# Patient Record
Sex: FEMALE | Race: WHITE | NOT HISPANIC OR LATINO | Employment: OTHER | URBAN - METROPOLITAN AREA
[De-identification: names, ages, dates, MRNs, and addresses within clinical notes are randomized per-mention and may not be internally consistent; named-entity substitution may affect disease eponyms.]

---

## 2019-05-12 ENCOUNTER — HOSPITAL ENCOUNTER (INPATIENT)
Facility: MEDICAL CENTER | Age: 84
LOS: 2 days | DRG: 378 | End: 2019-05-17
Attending: EMERGENCY MEDICINE | Admitting: HOSPITALIST
Payer: MEDICARE

## 2019-05-12 ENCOUNTER — APPOINTMENT (OUTPATIENT)
Dept: RADIOLOGY | Facility: MEDICAL CENTER | Age: 84
DRG: 378 | End: 2019-05-12
Attending: EMERGENCY MEDICINE
Payer: MEDICARE

## 2019-05-12 DIAGNOSIS — E87.1 HYPONATREMIA: ICD-10-CM

## 2019-05-12 DIAGNOSIS — E03.9 HYPOTHYROIDISM, UNSPECIFIED TYPE: ICD-10-CM

## 2019-05-12 DIAGNOSIS — G89.4 CHRONIC PAIN DISORDER: ICD-10-CM

## 2019-05-12 DIAGNOSIS — R55 NEAR SYNCOPE: ICD-10-CM

## 2019-05-12 DIAGNOSIS — I10 BENIGN ESSENTIAL HTN: ICD-10-CM

## 2019-05-12 DIAGNOSIS — K62.5 RECTAL BLEED: ICD-10-CM

## 2019-05-12 DIAGNOSIS — K92.2 LOWER GI BLEED: ICD-10-CM

## 2019-05-12 PROBLEM — E78.5 HLD (HYPERLIPIDEMIA): Status: ACTIVE | Noted: 2019-05-12

## 2019-05-12 LAB
ABO GROUP BLD: NORMAL
ALBUMIN SERPL BCP-MCNC: 3.7 G/DL (ref 3.2–4.9)
ALBUMIN/GLOB SERPL: 1.2 G/DL
ALP SERPL-CCNC: 56 U/L (ref 30–99)
ALT SERPL-CCNC: 10 U/L (ref 2–50)
ANION GAP SERPL CALC-SCNC: 7 MMOL/L (ref 0–11.9)
APTT PPP: 27.8 SEC (ref 24.7–36)
AST SERPL-CCNC: 19 U/L (ref 12–45)
BASOPHILS # BLD AUTO: 0.1 % (ref 0–1.8)
BASOPHILS # BLD: 0.01 K/UL (ref 0–0.12)
BILIRUB SERPL-MCNC: 0.5 MG/DL (ref 0.1–1.5)
BLD GP AB SCN SERPL QL: NORMAL
BUN SERPL-MCNC: 16 MG/DL (ref 8–22)
CALCIUM SERPL-MCNC: 10 MG/DL (ref 8.5–10.5)
CHLORIDE SERPL-SCNC: 89 MMOL/L (ref 96–112)
CO2 SERPL-SCNC: 33 MMOL/L (ref 20–33)
CREAT SERPL-MCNC: 0.62 MG/DL (ref 0.5–1.4)
EOSINOPHIL # BLD AUTO: 0.01 K/UL (ref 0–0.51)
EOSINOPHIL NFR BLD: 0.1 % (ref 0–6.9)
ERYTHROCYTE [DISTWIDTH] IN BLOOD BY AUTOMATED COUNT: 46.5 FL (ref 35.9–50)
GLOBULIN SER CALC-MCNC: 3.1 G/DL (ref 1.9–3.5)
GLUCOSE SERPL-MCNC: 116 MG/DL (ref 65–99)
HCT VFR BLD AUTO: 30.3 % (ref 37–47)
HGB BLD-MCNC: 10.2 G/DL (ref 12–16)
IMM GRANULOCYTES # BLD AUTO: 0.03 K/UL (ref 0–0.11)
IMM GRANULOCYTES NFR BLD AUTO: 0.3 % (ref 0–0.9)
INR PPP: 1.04 (ref 0.87–1.13)
LIPASE SERPL-CCNC: 35 U/L (ref 11–82)
LYMPHOCYTES # BLD AUTO: 1.34 K/UL (ref 1–4.8)
LYMPHOCYTES NFR BLD: 14.3 % (ref 22–41)
MCH RBC QN AUTO: 31.2 PG (ref 27–33)
MCHC RBC AUTO-ENTMCNC: 33.7 G/DL (ref 33.6–35)
MCV RBC AUTO: 92.7 FL (ref 81.4–97.8)
MONOCYTES # BLD AUTO: 0.83 K/UL (ref 0–0.85)
MONOCYTES NFR BLD AUTO: 8.9 % (ref 0–13.4)
NEUTROPHILS # BLD AUTO: 7.13 K/UL (ref 2–7.15)
NEUTROPHILS NFR BLD: 76.3 % (ref 44–72)
NRBC # BLD AUTO: 0 K/UL
NRBC BLD-RTO: 0 /100 WBC
OSMOLALITY SERPL: 276 MOSM/KG H2O (ref 278–298)
PLATELET # BLD AUTO: 184 K/UL (ref 164–446)
PMV BLD AUTO: 9.1 FL (ref 9–12.9)
POTASSIUM SERPL-SCNC: 3.6 MMOL/L (ref 3.6–5.5)
PROT SERPL-MCNC: 6.8 G/DL (ref 6–8.2)
PROTHROMBIN TIME: 13.7 SEC (ref 12–14.6)
RBC # BLD AUTO: 3.27 M/UL (ref 4.2–5.4)
RH BLD: NORMAL
SODIUM SERPL-SCNC: 129 MMOL/L (ref 135–145)
TSH SERPL DL<=0.005 MIU/L-ACNC: 1.61 UIU/ML (ref 0.38–5.33)
WBC # BLD AUTO: 9.4 K/UL (ref 4.8–10.8)

## 2019-05-12 PROCEDURE — 83690 ASSAY OF LIPASE: CPT

## 2019-05-12 PROCEDURE — G0378 HOSPITAL OBSERVATION PER HR: HCPCS

## 2019-05-12 PROCEDURE — 84443 ASSAY THYROID STIM HORMONE: CPT

## 2019-05-12 PROCEDURE — 700105 HCHG RX REV CODE 258: Performed by: EMERGENCY MEDICINE

## 2019-05-12 PROCEDURE — 99285 EMERGENCY DEPT VISIT HI MDM: CPT

## 2019-05-12 PROCEDURE — 700117 HCHG RX CONTRAST REV CODE 255: Performed by: EMERGENCY MEDICINE

## 2019-05-12 PROCEDURE — 86850 RBC ANTIBODY SCREEN: CPT

## 2019-05-12 PROCEDURE — 700102 HCHG RX REV CODE 250 W/ 637 OVERRIDE(OP): Performed by: HOSPITALIST

## 2019-05-12 PROCEDURE — 85610 PROTHROMBIN TIME: CPT

## 2019-05-12 PROCEDURE — 302255 BARRIER CREAM MOISTURE BAZA PROTECT (ZINC) 5OZ: Performed by: INTERNAL MEDICINE

## 2019-05-12 PROCEDURE — 99220 PR INITIAL OBSERVATION CARE,LEVL III: CPT | Performed by: HOSPITALIST

## 2019-05-12 PROCEDURE — A9270 NON-COVERED ITEM OR SERVICE: HCPCS | Performed by: HOSPITALIST

## 2019-05-12 PROCEDURE — 85025 COMPLETE CBC W/AUTO DIFF WBC: CPT

## 2019-05-12 PROCEDURE — 700105 HCHG RX REV CODE 258: Performed by: HOSPITALIST

## 2019-05-12 PROCEDURE — 80053 COMPREHEN METABOLIC PANEL: CPT

## 2019-05-12 PROCEDURE — 86900 BLOOD TYPING SEROLOGIC ABO: CPT

## 2019-05-12 PROCEDURE — 83930 ASSAY OF BLOOD OSMOLALITY: CPT

## 2019-05-12 PROCEDURE — 85730 THROMBOPLASTIN TIME PARTIAL: CPT

## 2019-05-12 PROCEDURE — 74177 CT ABD & PELVIS W/CONTRAST: CPT

## 2019-05-12 PROCEDURE — 306588 SLEEVE,VASO CALF MED: Performed by: HOSPITALIST

## 2019-05-12 PROCEDURE — 86901 BLOOD TYPING SEROLOGIC RH(D): CPT

## 2019-05-12 RX ORDER — LEVOTHYROXINE SODIUM 112 UG/1
112 TABLET ORAL
Status: DISCONTINUED | OUTPATIENT
Start: 2019-05-13 | End: 2019-05-17 | Stop reason: HOSPADM

## 2019-05-12 RX ORDER — SODIUM CHLORIDE, SODIUM LACTATE, POTASSIUM CHLORIDE, CALCIUM CHLORIDE 600; 310; 30; 20 MG/100ML; MG/100ML; MG/100ML; MG/100ML
1000 INJECTION, SOLUTION INTRAVENOUS ONCE
Status: COMPLETED | OUTPATIENT
Start: 2019-05-12 | End: 2019-05-12

## 2019-05-12 RX ORDER — ACETAMINOPHEN 325 MG/1
650 TABLET ORAL EVERY 6 HOURS PRN
Status: DISCONTINUED | OUTPATIENT
Start: 2019-05-12 | End: 2019-05-17 | Stop reason: HOSPADM

## 2019-05-12 RX ORDER — OXYBUTYNIN CHLORIDE 5 MG/1
5 TABLET ORAL 2 TIMES DAILY
Status: DISCONTINUED | OUTPATIENT
Start: 2019-05-12 | End: 2019-05-17 | Stop reason: HOSPADM

## 2019-05-12 RX ORDER — AMOXICILLIN 250 MG
2 CAPSULE ORAL 2 TIMES DAILY
Status: DISCONTINUED | OUTPATIENT
Start: 2019-05-12 | End: 2019-05-17 | Stop reason: HOSPADM

## 2019-05-12 RX ORDER — AMLODIPINE BESYLATE 2.5 MG/1
2.5 TABLET ORAL DAILY
Status: DISCONTINUED | OUTPATIENT
Start: 2019-05-13 | End: 2019-05-17 | Stop reason: HOSPADM

## 2019-05-12 RX ORDER — BISACODYL 10 MG
10 SUPPOSITORY, RECTAL RECTAL
Status: DISCONTINUED | OUTPATIENT
Start: 2019-05-12 | End: 2019-05-17 | Stop reason: HOSPADM

## 2019-05-12 RX ORDER — SODIUM CHLORIDE, SODIUM LACTATE, POTASSIUM CHLORIDE, CALCIUM CHLORIDE 600; 310; 30; 20 MG/100ML; MG/100ML; MG/100ML; MG/100ML
INJECTION, SOLUTION INTRAVENOUS CONTINUOUS
Status: DISCONTINUED | OUTPATIENT
Start: 2019-05-12 | End: 2019-05-14

## 2019-05-12 RX ORDER — LATANOPROST 50 UG/ML
1 SOLUTION/ DROPS OPHTHALMIC EVERY EVENING
Status: DISCONTINUED | OUTPATIENT
Start: 2019-05-12 | End: 2019-05-17 | Stop reason: HOSPADM

## 2019-05-12 RX ORDER — POLYETHYLENE GLYCOL 3350 17 G/17G
1 POWDER, FOR SOLUTION ORAL
Status: DISCONTINUED | OUTPATIENT
Start: 2019-05-12 | End: 2019-05-17 | Stop reason: HOSPADM

## 2019-05-12 RX ORDER — SIMVASTATIN 20 MG
20 TABLET ORAL EVERY EVENING
Status: DISCONTINUED | OUTPATIENT
Start: 2019-05-12 | End: 2019-05-17 | Stop reason: HOSPADM

## 2019-05-12 RX ORDER — HYDROCHLOROTHIAZIDE 25 MG/1
25 TABLET ORAL DAILY
Status: DISCONTINUED | OUTPATIENT
Start: 2019-05-13 | End: 2019-05-13

## 2019-05-12 RX ORDER — CETIRIZINE HYDROCHLORIDE 10 MG/1
10 TABLET ORAL EVERY MORNING
COMMUNITY
End: 2019-09-19

## 2019-05-12 RX ORDER — OXYBUTYNIN CHLORIDE 5 MG/1
5 TABLET ORAL 2 TIMES DAILY
Status: ON HOLD | COMMUNITY
End: 2019-05-17

## 2019-05-12 RX ADMIN — SODIUM CHLORIDE, POTASSIUM CHLORIDE, SODIUM LACTATE AND CALCIUM CHLORIDE: 600; 310; 30; 20 INJECTION, SOLUTION INTRAVENOUS at 21:16

## 2019-05-12 RX ADMIN — METOPROLOL TARTRATE 25 MG: 25 TABLET ORAL at 21:26

## 2019-05-12 RX ADMIN — SIMVASTATIN 20 MG: 20 TABLET, FILM COATED ORAL at 21:26

## 2019-05-12 RX ADMIN — SODIUM CHLORIDE, POTASSIUM CHLORIDE, SODIUM LACTATE AND CALCIUM CHLORIDE 1000 ML: 600; 310; 30; 20 INJECTION, SOLUTION INTRAVENOUS at 17:04

## 2019-05-12 RX ADMIN — IOHEXOL 90 ML: 350 INJECTION, SOLUTION INTRAVENOUS at 20:00

## 2019-05-12 RX ADMIN — OXYBUTYNIN CHLORIDE 5 MG: 5 TABLET ORAL at 21:27

## 2019-05-12 RX ADMIN — SENNOSIDES, DOCUSATE SODIUM 2 TABLET: 50; 8.6 TABLET, FILM COATED ORAL at 21:26

## 2019-05-12 ASSESSMENT — ENCOUNTER SYMPTOMS
DEPRESSION: 0
DIZZINESS: 0
ABDOMINAL PAIN: 1
FOCAL WEAKNESS: 0
HEADACHES: 0
PHOTOPHOBIA: 0
MYALGIAS: 0
COUGH: 0
VOMITING: 0
DIARRHEA: 0
SORE THROAT: 0
BLOOD IN STOOL: 1
SHORTNESS OF BREATH: 0
NAUSEA: 1
CHILLS: 0
FEVER: 0
TINGLING: 0
PALPITATIONS: 0
WHEEZING: 0

## 2019-05-12 ASSESSMENT — PATIENT HEALTH QUESTIONNAIRE - PHQ9
SUM OF ALL RESPONSES TO PHQ9 QUESTIONS 1 AND 2: 0
1. LITTLE INTEREST OR PLEASURE IN DOING THINGS: NOT AT ALL
2. FEELING DOWN, DEPRESSED, IRRITABLE, OR HOPELESS: NOT AT ALL

## 2019-05-12 ASSESSMENT — LIFESTYLE VARIABLES
EVER_SMOKED: NEVER
ALCOHOL_USE: NO

## 2019-05-12 NOTE — ED TRIAGE NOTES
"Pt to triage via w/c c/o blood in stool since yesterday. Denies blood thinners. Pt denies pain. Pt family states \"not black but not bright red either, the color is somewhere in between\" nad. Pt and family waiting in [facility]  "

## 2019-05-12 NOTE — ED PROVIDER NOTES
ED Provider Note    Scribed for Louie Esqueda M.D. by Mayra Grady. 5/12/2019  4:23 PM    Primary care provider: Pcp Not In Computer  Means of arrival: Wheel chair   History obtained from: Patient  History limited by: None    CHIEF COMPLAINT  Chief Complaint   Patient presents with   • Rectal Bleeding       HPI  Марина Giron is a 88 y.o. female with a past medical history of diverticulitis, hypertension, and hypothyroidism who presents to the Emergency Department with blood in stool onset last morning around 4:30 AM. Patient states she had 1 bowel movement early last morning and did not have any additional BM subsequently. She reports experiencing associated dizziness and lightheadedness yesterday while using the bathroom. Patient tried laying down for symptom relief however symptoms returned when she stood up again. She reports she is fatigued currently. Patient adds that she has been experiencing left sided abdominal pain. Patient states she had a similar episode previously and underwent colonoscopy with Dr. Mejia GI, and was diagnosed with diverticulitis 5 years ago. She is unsure if she underwent a colonoscopy since diagnosis. Denies fever.     REVIEW OF SYSTEMS  Pertinent negatives include no fever.   As above, all other systems reviewed and are negative.   See HPI for further details.     PAST MEDICAL HISTORY   has a past medical history of Chronic back pain; Diverticulitis; Hypertension; and Hypothyroid.    SURGICAL HISTORY   has a past surgical history that includes colonoscopy - endo (5/24/2009) and colonoscopy with polyp (10/11/2014).    SOCIAL HISTORY  Social History   Substance Use Topics   • Smoking status: Never Smoker   • Smokeless tobacco: Never Used   • Alcohol use No      History   Drug Use No     FAMILY HISTORY  History reviewed. No pertinent family history.    CURRENT MEDICATIONS  Home Medications     Reviewed by Elke Leonard R.N. (Registered Nurse) on 05/12/19 at 1523  Med List  "Status: Partial   Medication Last Dose Status   amlodipine (NORVASC) 2.5 MG TABS  Active   atenolol (TENORMIN) 25 MG TABS  Active   B Complex-C (SUPER B COMPLEX PO)  Active   Calcium Carbonate-Vitamin D (CALCIUM 600 + D PO)  Active   Cholecalciferol (VITAMIN D-3) 400 UNIT TABS  Active   hydrochlorothiazide (HYDRODIURIL) 25 MG TABS  Active   levothyroxine (SYNTHROID) 112 MCG TABS  Active   Multiple Vitamins-Minerals (PRESERVISION AREDS PO)  Active   NON SPECIFIED  Active   Psyllium (METAMUCIL PO)  Active   simvastatin (ZOCOR) 20 MG TABS  Active                ALLERGIES  Allergies   Allergen Reactions   • Codeine      Upset stomach.   • Neosporin [Neomycin-Bacitracin-Polymyxin] Rash     PHYSICAL EXAM  VITAL SIGNS: /61   Pulse 93   Temp 37.2 °C (98.9 °F) (Temporal)   Resp 16   Ht 1.6 m (5' 3\")   Wt 89.4 kg (197 lb)   SpO2 94%   BMI 34.90 kg/m²     Constitutional: Well developed, Well nourished, No acute distress, Non-toxic appearance.   HENT: Normocephalic, Atraumatic, Bilateral external ears normal, Oropharynx is clear mucous membranes are dry. No oral exudates or nasal discharge.   Eyes: Pupils are equal round and reactive, EOMI, Conjunctiva pale, No discharge.   Neck: Normal range of motion, No tenderness, Supple, No stridor. No meningismus.  Cardiovascular: Regular rate and rhythm without murmur rub or gallop.  Thorax & Lungs: Clear breath sounds bilaterally without wheezes, rhonchi or rales. There is no chest wall tenderness.   Abdomen: Soft and non-distended. Tenderness to epigastrium and left upper and lower quadrant. There is no rebound or guarding. No organomegaly is appreciated. Bowel sounds are normal.  Rectal: Melena noted in diaper, therefore rectal exam not performed. Guaiac positive stool.   Skin: Pallor. No rash.   Back: No CVA or spinal tenderness.   Extremities: Intact distal pulses, No edema, No tenderness, No cyanosis, No clubbing. Capillary refill is less than 2 seconds. Pale palms. "   Musculoskeletal: Good range of motion in all major joints. No tenderness to palpation or major deformities noted.   Neurologic: Alert & oriented x 3, Normal motor function, Normal sensory function, No focal deficits noted. Reflexes are normal.  Psychiatric: Affect normal, Judgment normal, Mood normal. There is no suicidal ideation or patient reported hallucinations.     DIAGNOSTIC STUDIES / PROCEDURES    LABS  Labs Reviewed   CBC WITH DIFFERENTIAL - Abnormal; Notable for the following:        Result Value    RBC 3.27 (*)     Hemoglobin 10.2 (*)     Hematocrit 30.3 (*)     Neutrophils-Polys 76.30 (*)     Lymphocytes 14.30 (*)     All other components within normal limits    Narrative:     Indicate which anticoagulants the patient is on:->UNKNOWN   COMP METABOLIC PANEL - Abnormal; Notable for the following:     Sodium 129 (*)     Chloride 89 (*)     Glucose 116 (*)     All other components within normal limits    Narrative:     Indicate which anticoagulants the patient is on:->UNKNOWN   LIPASE    Narrative:     Indicate which anticoagulants the patient is on:->UNKNOWN   COD (ADULT)   APTT    Narrative:     Indicate which anticoagulants the patient is on:->UNKNOWN   PROTHROMBIN TIME    Narrative:     Indicate which anticoagulants the patient is on:->UNKNOWN   ESTIMATED GFR    Narrative:     Indicate which anticoagulants the patient is on:->UNKNOWN   All labs reviewed by me.    RADIOLOGY  CT-ABDOMEN-PELVIS WITH    (Results Pending)   The radiologist's interpretation of all radiological studies have been reviewed by me.    COURSE & MEDICAL DECISION MAKING  Nursing notes, VS, PMSFHx reviewed in chart.    Review of past medical records shows the patient was seen by Dr. Mejia GI, and underwent colonoscopy on 10/11/2014 and was found to have severe diverticular disease from the hepatic flexure distally with multiple colon polyps.     4:23 PM Patient seen and examined at bedside. Ordered for CT abdomen pelvis and labs  to evaluate. Patient will be given IV fluids for NPO status and dehydration.     5:43 PM Reviewed patient's lab results which showed hemoglobin level of 10.2, does not indicate blood transfusion at this time. However, lab results show hyponatremia. Given near syncopal episode and guaiac positive stool, patient will be admitted for inpatient colonoscopy. Patient and family member were informed of this plan of care which they agree with.     6:02 PM Paged LEXI Matos, and Dr. Wang, Hospitalist.   Laboratory evaluation reveals no leukocytosis, significant shift but there is anemia with a hemoglobin of 10.2.  Previous hemoglobin was also 10.2.  There is hyponatremia 129 with no evidence of significant glucose elevation.  Renal and liver function appear to be normal.  INR is normal at 1.04.    6:09 PM Consulted patient's case with Dr. Cunningham for LEXI Matos, who agreed to consult patient.     6:10 PM Discussed patient's case and diagnostic results with Dr. Wang, Hospitalist, who agreed to admit patient. Patient's care was transferred at this time.  CAT scan is currently still pending.    DISPOSITION:  Patient will be admitted to Dr. Wang, Hospitalist, in guarded condition.    FINAL IMPRESSION  1. Lower GI bleed    2. Hyponatremia    3. Near syncope       Mayra GARRIDO (Scribe), am scribing for, and in the presence of, Louie Esqueda M.D.  Electronically signed by: Mayra Grady (Scribe), 5/12/2019  Louie GARRIDO M.D. personally performed the services described in this documentation, as scribed by Mayra Grady in my presence, and it is both accurate and complete. C.     The note accurately reflects work and decisions made by me.  Louie Esqueda  5/12/2019  6:57 PM

## 2019-05-13 PROBLEM — G93.40 ACUTE ENCEPHALOPATHY: Status: ACTIVE | Noted: 2019-05-13

## 2019-05-13 LAB
ANION GAP SERPL CALC-SCNC: 5 MMOL/L (ref 0–11.9)
BUN SERPL-MCNC: 10 MG/DL (ref 8–22)
CALCIUM SERPL-MCNC: 9.2 MG/DL (ref 8.5–10.5)
CHLORIDE SERPL-SCNC: 92 MMOL/L (ref 96–112)
CHOLEST SERPL-MCNC: 115 MG/DL (ref 100–199)
CO2 SERPL-SCNC: 32 MMOL/L (ref 20–33)
CREAT SERPL-MCNC: 0.41 MG/DL (ref 0.5–1.4)
GLUCOSE SERPL-MCNC: 101 MG/DL (ref 65–99)
HDLC SERPL-MCNC: 52 MG/DL
HGB BLD-MCNC: 8.5 G/DL (ref 12–16)
HGB BLD-MCNC: 8.5 G/DL (ref 12–16)
HGB BLD-MCNC: 8.8 G/DL (ref 12–16)
HGB BLD-MCNC: 8.8 G/DL (ref 12–16)
LDLC SERPL CALC-MCNC: 53 MG/DL
MAGNESIUM SERPL-MCNC: 1.7 MG/DL (ref 1.5–2.5)
PHOSPHATE SERPL-MCNC: 2.8 MG/DL (ref 2.5–4.5)
POTASSIUM SERPL-SCNC: 3.3 MMOL/L (ref 3.6–5.5)
SODIUM SERPL-SCNC: 129 MMOL/L (ref 135–145)
TRIGL SERPL-MCNC: 51 MG/DL (ref 0–149)

## 2019-05-13 PROCEDURE — A9270 NON-COVERED ITEM OR SERVICE: HCPCS | Performed by: INTERNAL MEDICINE

## 2019-05-13 PROCEDURE — 700102 HCHG RX REV CODE 250 W/ 637 OVERRIDE(OP): Performed by: INTERNAL MEDICINE

## 2019-05-13 PROCEDURE — 302135 SEQUENTIAL COMPRESSION MACHINE: Performed by: HOSPITALIST

## 2019-05-13 PROCEDURE — 80061 LIPID PANEL: CPT

## 2019-05-13 PROCEDURE — A9270 NON-COVERED ITEM OR SERVICE: HCPCS | Performed by: NURSE PRACTITIONER

## 2019-05-13 PROCEDURE — 93005 ELECTROCARDIOGRAM TRACING: CPT | Performed by: INTERNAL MEDICINE

## 2019-05-13 PROCEDURE — 700105 HCHG RX REV CODE 258: Performed by: HOSPITALIST

## 2019-05-13 PROCEDURE — 700102 HCHG RX REV CODE 250 W/ 637 OVERRIDE(OP): Performed by: NURSE PRACTITIONER

## 2019-05-13 PROCEDURE — G0378 HOSPITAL OBSERVATION PER HR: HCPCS

## 2019-05-13 PROCEDURE — 83735 ASSAY OF MAGNESIUM: CPT

## 2019-05-13 PROCEDURE — 99226 PR SUBSEQUENT OBSERVATION CARE,LEVEL III: CPT | Performed by: HOSPITALIST

## 2019-05-13 PROCEDURE — 36415 COLL VENOUS BLD VENIPUNCTURE: CPT

## 2019-05-13 PROCEDURE — 700102 HCHG RX REV CODE 250 W/ 637 OVERRIDE(OP): Performed by: HOSPITALIST

## 2019-05-13 PROCEDURE — 84100 ASSAY OF PHOSPHORUS: CPT

## 2019-05-13 PROCEDURE — 80048 BASIC METABOLIC PNL TOTAL CA: CPT

## 2019-05-13 PROCEDURE — A9270 NON-COVERED ITEM OR SERVICE: HCPCS | Performed by: HOSPITALIST

## 2019-05-13 PROCEDURE — 85018 HEMOGLOBIN: CPT

## 2019-05-13 RX ORDER — POTASSIUM CHLORIDE 20 MEQ/1
20 TABLET, EXTENDED RELEASE ORAL DAILY
Status: DISCONTINUED | OUTPATIENT
Start: 2019-05-13 | End: 2019-05-17 | Stop reason: HOSPADM

## 2019-05-13 RX ORDER — BISACODYL 5 MG
10 TABLET, DELAYED RELEASE (ENTERIC COATED) ORAL ONCE
Status: DISPENSED | OUTPATIENT
Start: 2019-05-13 | End: 2019-05-14

## 2019-05-13 RX ORDER — PEG-3350, SODIUM SULFATE, SODIUM CHLORIDE, POTASSIUM CHLORIDE, SODIUM ASCORBATE AND ASCORBIC ACID 7.5-2.691G
100 KIT ORAL 2 TIMES DAILY
Status: COMPLETED | OUTPATIENT
Start: 2019-05-13 | End: 2019-05-13

## 2019-05-13 RX ORDER — POLYETHYLENE GLYCOL 3350 17 G/17G
1 POWDER, FOR SOLUTION ORAL ONCE
Status: DISPENSED | OUTPATIENT
Start: 2019-05-13 | End: 2019-05-14

## 2019-05-13 RX ADMIN — SODIUM CHLORIDE, POTASSIUM CHLORIDE, SODIUM LACTATE AND CALCIUM CHLORIDE: 600; 310; 30; 20 INJECTION, SOLUTION INTRAVENOUS at 10:10

## 2019-05-13 RX ADMIN — LEVOTHYROXINE SODIUM 112 MCG: 112 TABLET ORAL at 05:50

## 2019-05-13 RX ADMIN — SENNOSIDES, DOCUSATE SODIUM 2 TABLET: 50; 8.6 TABLET, FILM COATED ORAL at 17:07

## 2019-05-13 RX ADMIN — METOPROLOL TARTRATE 25 MG: 25 TABLET ORAL at 17:07

## 2019-05-13 RX ADMIN — OXYBUTYNIN CHLORIDE 5 MG: 5 TABLET ORAL at 17:07

## 2019-05-13 RX ADMIN — POLYETHYLENE GLYCOL 3350, SODIUM SULFATE, SODIUM CHLORIDE, POTASSIUM CHLORIDE, ASCORBIC ACID, SODIUM ASCORBATE 100 G: KIT at 21:15

## 2019-05-13 RX ADMIN — OXYBUTYNIN CHLORIDE 5 MG: 5 TABLET ORAL at 05:50

## 2019-05-13 RX ADMIN — SIMVASTATIN 20 MG: 20 TABLET, FILM COATED ORAL at 17:07

## 2019-05-13 RX ADMIN — POLYETHYLENE GLYCOL 3350, SODIUM SULFATE, SODIUM CHLORIDE, POTASSIUM CHLORIDE, ASCORBIC ACID, SODIUM ASCORBATE 100 G: KIT at 16:59

## 2019-05-13 RX ADMIN — LATANOPROST 1 DROP: 50 SOLUTION OPHTHALMIC at 17:08

## 2019-05-13 RX ADMIN — POTASSIUM CHLORIDE 20 MEQ: 1500 TABLET, EXTENDED RELEASE ORAL at 09:00

## 2019-05-13 ASSESSMENT — COGNITIVE AND FUNCTIONAL STATUS - GENERAL
EATING MEALS: A LITTLE
TURNING FROM BACK TO SIDE WHILE IN FLAT BAD: A LITTLE
DRESSING REGULAR UPPER BODY CLOTHING: A LITTLE
HELP NEEDED FOR BATHING: A LITTLE
DRESSING REGULAR LOWER BODY CLOTHING: A LITTLE
PERSONAL GROOMING: A LITTLE
WALKING IN HOSPITAL ROOM: A LOT
MOVING FROM LYING ON BACK TO SITTING ON SIDE OF FLAT BED: A LITTLE
MOVING TO AND FROM BED TO CHAIR: A LITTLE
SUGGESTED CMS G CODE MODIFIER MOBILITY: CL
MOBILITY SCORE: 14
STANDING UP FROM CHAIR USING ARMS: A LOT
DAILY ACTIVITIY SCORE: 18
SUGGESTED CMS G CODE MODIFIER DAILY ACTIVITY: CK
CLIMB 3 TO 5 STEPS WITH RAILING: TOTAL
TOILETING: A LITTLE

## 2019-05-13 ASSESSMENT — ENCOUNTER SYMPTOMS
ABDOMINAL PAIN: 0
MYALGIAS: 0
VOMITING: 0
CHILLS: 0
FEVER: 0
NAUSEA: 0
HEADACHES: 0
BLOOD IN STOOL: 1

## 2019-05-13 NOTE — PROGRESS NOTES
Patient sleeping, rise and fall of chest noted. Call light and belongings within reach. Will assess when awake.

## 2019-05-13 NOTE — PROGRESS NOTES
2 RN Skin check complete with CARRINGTON Celis.  Devices in place: silicone oxygen tubing.  Skin assessed under devices: intact, blanching.  Confirmed pressure ulcers: N/A  New potential pressure ulcers noted on: N/A  The following interventions in place: silicone oxygen tubing, barrier wipes used, pillows used for repositioning and to float heels, turning encouraged     generalized integumentary is pale, scattered bruising, intact Bilateral heels - red, blanching   Bilateral elbows - pink, dry, blanching  Bilateral heels - dry, calloused, blanching  Sacrum - red, blanching

## 2019-05-13 NOTE — H&P
Hospital Medicine History & Physical Note    Date of Service  5/12/2019    Primary Care Physician  Pcp Not In Computer    Consultants  Dr. Elvis Cunningham, GI    Code Status  Full    Chief Complaint  Chief Complaint   Patient presents with   • Rectal Bleeding       History of Presenting Illness  88 y.o. female who presented on 5/12/2019 with rectal bleeding.  This is a very pleasant elderly female who has a history of diverticulitis and was last seen by GI 5 years ago for scope during which time she states polyps were found.  She states that her symptoms began yesterday when she had several episodes of bright red blood per rectum.  She states that she had formed stools within the blood and that the stools themselves were brown.  Today, she has had a left-sided cramping ache as well as nausea all day but no vomiting.  She denies any fevers or chills.  She denies taking significant amounts of NSAIDs although she does report taking one Aleve earlier this week.  She has had no chest pain or shortness of breath.  Because of her symptoms, she has had poor p.o. intake for the last day.      Review of Systems  Review of Systems   Constitutional: Negative for chills and fever.   HENT: Negative for congestion and sore throat.    Eyes: Negative for photophobia.   Respiratory: Negative for cough, shortness of breath and wheezing.    Cardiovascular: Negative for chest pain and palpitations.   Gastrointestinal: Positive for abdominal pain, blood in stool and nausea. Negative for diarrhea and vomiting.   Genitourinary: Negative for dysuria.   Musculoskeletal: Negative for myalgias.   Skin: Negative.    Neurological: Negative for dizziness, tingling, focal weakness and headaches.   Psychiatric/Behavioral: Negative for depression and suicidal ideas.       Past Medical History  Past Medical History:   Diagnosis Date   • Chronic back pain    • Diverticulitis    • Hypertension    • Hypothyroid        Surgical History  Past Surgical  History:   Procedure Laterality Date   • COLONOSCOPY WITH POLYP  10/11/2014    Performed by Samuel Mejia M.D. at ENDOSCOPY [facility]   • COLONOSCOPY - ENDO  2009    Performed by KERRY PADGETT at ENDOSCOPY [facility]       Family History  History reviewed. No pertinent family history.    Social History  Social History   Substance Use Topics   • Smoking status: Never Smoker   • Smokeless tobacco: Never Used   • Alcohol use No       Allergies  Allergies   Allergen Reactions   • Codeine      Upset stomach.   • Neosporin [Neomycin-Bacitracin-Polymyxin] Rash       Medications  No current facility-administered medications on file prior to encounter.      Current Outpatient Prescriptions on File Prior to Encounter   Medication Sig Dispense Refill   • levothyroxine (SYNTHROID) 112 MCG TABS Take 112 mcg by mouth every morning before breakfast.     • simvastatin (ZOCOR) 20 MG TABS Take 20 mg by mouth every evening.     • amlodipine (NORVASC) 2.5 MG TABS Take 2.5 mg by mouth every day.     • hydrochlorothiazide (HYDRODIURIL) 25 MG TABS Take 25 mg by mouth every day.     • Multiple Vitamins-Minerals (PRESERVISION AREDS PO) Take 1 Tab by mouth 2 Times a Day.     • Calcium Carbonate-Vitamin D (CALCIUM 600 + D PO) Take 1 Tab by mouth every day. Indications: **OTC**     • B Complex-C (SUPER B COMPLEX PO) Take 1 Tab by mouth every day. Indications: **OTC**         Physical Exam  Hemodynamics  Temp (24hrs), Av.2 °C (98.9 °F), Min:37.2 °C (98.9 °F), Max:37.2 °C (98.9 °F)   Temperature: 37.2 °C (98.9 °F)  Pulse  Av.8  Min: 75  Max: 93 Heart Rate (Monitored): 87  Blood Pressure : 116/50, NIBP: 140/64      Respiratory      Respiration: 16, Pulse Oximetry: 92 %             Physical Exam   Constitutional: She is oriented to person, place, and time. No distress.   Elderly, pleasant, frail   HENT:   Head: Normocephalic and atraumatic.   Right Ear: External ear normal.   Left Ear: External ear normal.   Eyes:  EOM are normal. Right eye exhibits no discharge. Left eye exhibits no discharge.   Neck: Neck supple. No JVD present.   Cardiovascular: Normal rate, regular rhythm and normal heart sounds.    Pulmonary/Chest: Effort normal and breath sounds normal. No respiratory distress. She exhibits no tenderness.   Abdominal: Soft. Bowel sounds are normal. She exhibits no distension. There is no tenderness.   Musculoskeletal: Normal range of motion. She exhibits no edema.   Neurological: She is alert and oriented to person, place, and time. No cranial nerve deficit.   Hard of hearing   Skin: Skin is warm and dry. She is not diaphoretic. No erythema.   Psychiatric: She has a normal mood and affect. Her behavior is normal.   Nursing note and vitals reviewed.    Capillary refill less than 3 seconds, distal pulses intact    Laboratory:  Recent Labs      05/12/19   1623   WBC  9.4   RBC  3.27*   HEMOGLOBIN  10.2*   HEMATOCRIT  30.3*   MCV  92.7   MCH  31.2   MCHC  33.7   RDW  46.5   PLATELETCT  184   MPV  9.1     Recent Labs      05/12/19   1623   SODIUM  129*   POTASSIUM  3.6   CHLORIDE  89*   CO2  33   GLUCOSE  116*   BUN  16   CREATININE  0.62   CALCIUM  10.0     Recent Labs      05/12/19   1623   ALTSGPT  10   ASTSGOT  19   ALKPHOSPHAT  56   TBILIRUBIN  0.5   LIPASE  35   GLUCOSE  116*     Recent Labs      05/12/19   1623   APTT  27.8   INR  1.04             Lab Results   Component Value Date    TROPONINI 0.06 (H) 10/14/2014       Imaging  No results found.      Assessment/Plan:  Anticipate that patient will need less than 2 midnights for management of the discussed medical issues.    * Rectal bleed   Assessment & Plan    Patient has had a history of diverticulitis in the past, CT scan is still pending but if this is positive, I will start her on antibiotic therapy with IV ceftriaxone and Flagyl.  She last had a colonoscopy 5 years ago and states that she had polyps removed although I do not have these records to confirm.  She  will be admitted to the hospital and kept n.p.o. for bowel rest and started on IV fluids for volume expansion.  I will continue to trend troponin levels and plan to transfuse if they drop below 7.  Her current hemoglobin appears to be unchanged from our last available lab in October 2014.  She is otherwise hemodynamically stable.  GI has been consulted by the emergency room physician and I appreciate the recommendations.     Hyponatremia   Assessment & Plan    No mental status changes, the patient admits to poor p.o. intake of fluids.  Check serum and urine osmolality levels, continue gentle fluids and repeat chemistries in the morning.  She does take hydrochlorothiazide which can cause hyponatremia although this is much more profound than would be expected from her current dosage.     Hypothyroid- (present on admission)   Assessment & Plan    This is chronic, check TSH and continue home Synthroid.     Benign essential HTN- (present on admission)   Assessment & Plan    Okay to continue home metoprolol, Norvasc as well as hydrochlorothiazide, if no improvement in hyponatremia, discontinue hydrochlorothiazide.         Prophylaxis: Sequential compression devices for DVT prophylaxis, no PPI indicated, bowel protocol as needed

## 2019-05-13 NOTE — PROGRESS NOTES
Received report from Willis SHULTZ. Assumed care of patient at 1445. Patient A&Ox4. On 2L NC, no signs of respiratory distress. Patient denies pain at this time. POC discussed and agreed upon with patient. Call light and belongings within reach. Bed in lowest locked position. Upper side rails raised. Hourly rounding in place. Will continue to monitor.

## 2019-05-13 NOTE — PROGRESS NOTES
Hospital Medicine Daily Progress Note    Date of Service  5/13/2019    Chief Complaint  88 y.o. female admitted 5/12/2019 with rectal bleeding and acute blood loss anemia    Hospital Course    This is an 88-year-old female admitted 5/12/2019 with complaints of bright red blood per rectum.  She is extremely hard of hearing and communication is limited precluding a more detailed history.  She was anemic at presentation at 10.2/30.3, MCV of 92 and every 6 hemoglobins have shown drops to 8.8, and 8.5.  Sodium was low at presentation at 129, with low chloride at 89 and elevated glucose at 116.  Remainder of her chemistry profile was benign.  Serum osmolality was 276 suggesting isotonic hyponatremia.  Workup has been ongoing.  Gastroenterology consultation with the patient's outpatient provider occurred in the ER.  Dr. Cunningham has evaluated the patient in the observation unit with plans to take the patient for EGD, colonoscopy as there is questionable reports of maroon/black stools as well.      Interval Problem Update  Patient is A/O x4, but extremely hard of hearing.  Her affect is lethargic and slowed she denies pain with the exception of her right knee during her history, but is markedly tender to the left upper and lower quadrant on exam.  She is a poor and unreliable historian and further details are unclear.  Hyponatremia--stable at 129  Hypokalemia of 3.3  Mag and phosphorus normal  Lipid panel is normal  INR and PTT normal  CT scan of the abdomen and pelvis without evidence of acute intra-abdominal pathology    Consultants/Specialty  Gastroenterology    Code Status  Full    Disposition  To be determined    Review of Systems  Review of Systems   Unable to perform ROS: Mental acuity   Constitutional: Negative for chills and fever.   Cardiovascular: Negative for chest pain.   Gastrointestinal: Positive for blood in stool. Negative for abdominal pain, nausea and vomiting.   Genitourinary: Negative for dysuria.    Musculoskeletal: Negative for myalgias.   Neurological: Negative for headaches.        Physical Exam  Temp:  [36.1 °C (97 °F)-37.2 °C (98.9 °F)] 36.6 °C (97.9 °F)  Pulse:  [75-93] 79  Resp:  [16-20] 18  BP: (103-179)/(50-81) 128/62  SpO2:  [92 %-99 %] 95 %    Physical Exam   Constitutional: She is oriented to person, place, and time. She appears well-developed and well-nourished. No distress.   HENT:   Head: Normocephalic and atraumatic.   Eyes: Pupils are equal, round, and reactive to light. EOM are normal.   Neck: Neck supple.   Cardiovascular: Normal rate, regular rhythm and normal heart sounds.    Pulmonary/Chest: Effort normal and breath sounds normal. No respiratory distress. She has no wheezes. She has no rales.   Abdominal: Soft. Normal appearance. She exhibits no distension. There is tenderness in the left upper quadrant and left lower quadrant. There is no rigidity, no rebound and no guarding.   Neurological: She is alert and oriented to person, place, and time. No cranial nerve deficit.   Skin: Skin is warm and dry.   Psychiatric: She has a normal mood and affect. Her speech is normal. She is slowed. Cognition and memory are normal.       Fluids    Intake/Output Summary (Last 24 hours) at 05/13/19 1012  Last data filed at 05/12/19 2141   Gross per 24 hour   Intake             1030 ml   Output                0 ml   Net             1030 ml       Laboratory  Recent Labs      05/12/19   1623  05/13/19   0004  05/13/19 0447   WBC  9.4   --    --    RBC  3.27*   --    --    HEMOGLOBIN  10.2*  8.8*  8.5*   HEMATOCRIT  30.3*   --    --    MCV  92.7   --    --    MCH  31.2   --    --    MCHC  33.7   --    --    RDW  46.5   --    --    PLATELETCT  184   --    --    MPV  9.1   --    --      Recent Labs      05/12/19   1623  05/13/19   0447   SODIUM  129*  129*   POTASSIUM  3.6  3.3*   CHLORIDE  89*  92*   CO2  33  32   GLUCOSE  116*  101*   BUN  16  10   CREATININE  0.62  0.41*   CALCIUM  10.0  9.2     Recent  Labs      05/12/19   1623   APTT  27.8   INR  1.04         Recent Labs      05/13/19   0447   TRIGLYCERIDE  51   HDL  52   LDL  53       Imaging  CT-ABDOMEN-PELVIS WITH   Final Result      1.  No evidence of bowel obstruction or focal inflammatory change.      2.  Extensive colonic diverticulosis.      3.  Fatty liver.      4.  Bilateral renal cysts.      5.  Extensive atherosclerotic vascular calcification.      6.  Multiple age indeterminate thoracolumbar spine compression fractures.           Assessment/Plan  * Rectal bleed   Assessment & Plan    CT abdomen and pelvis benign  Every 6 hemoglobin  GI consult  EGD and colonoscopy planned for 5/14/2019  Bowel prep for GI  N.p.o. at midnight     Hyponatremia   Assessment & Plan    Stable  Baseline cognition unclear  Stop HCTZ  Serum osmolality 276 which is borderline  Lipid panel ordered 5/13--normal  Albumin and total protein normal  Urine osm pending  TSH normal  Possible SIADH, hypokalemia       HLD (hyperlipidemia)   Assessment & Plan    Okay to continue home Zocor.     Hypothyroid- (present on admission)   Assessment & Plan    This is chronic,   TSH euthyroid     Benign essential HTN- (present on admission)   Assessment & Plan    Okay to continue home metoprolol, Norvasc  Monitor          VTE prophylaxis: SCDs

## 2019-05-13 NOTE — CONSULTS
[facility]    DATE OF SERVICE:  05/13/2019    GASTROENTEROLOGY CONSULTATION    TIME:  8 a.m.    REFERRING PHYSICIAN:  Louie Esqueda MD    PRIMARY CARE PHYSICIAN:  Unknown.    GASTROENTEROLOGIST:  Samuel Mejia MD    REASON FOR CONSULTATION:  GI bleeding.    HISTORY OF PRESENT ILLNESS:  An 88-year-old  female with colonic   diverticulosis and history of multiple colon polyps, presents with maroon   hematochezia versus melena.  Patient presents to [facility]   Department, had acute onset of GI bleeding 1 day prior to admission.  Early in   the morning at about 4:30, she had several bowel movements of maroon stools   per nursing notes.  She also describes some rectal bleeding, but per ER   physician, Dr. Louie Esqueda, he felt that there was guaiac positive melena in her   diaper.  Otherwise, patient is very hard of hearing and limited history can   be obtained from her.  She, however, denied any abdominal pain, fevers or   chills.  She did have colonoscopy in 2014, at which time multiple adenomatous   colon polyps removed and she also had severe colonic diverticulosis.  She   reportedly had an upper endoscopy in 2009 with unknown results.  Otherwise,   patient denies any further modifying factors, associated symptoms, or timing   issues with her complaints.    PAST MEDICAL HISTORY:  Colonic diverticulosis, hypothyroidism, postmenopausal   status, chronic back pain, hypertension, personal history of multiple colon   polyps, hypercholesterolemia, impaired hearing, BMI of 32.6, bilateral renal   cysts, aortic atherosclerosis, vascular calcifications, thoracolumbar spine   compression fractures, left bundle-branch block.    PAST SURGICAL HISTORY:  Denied.    SOCIAL HISTORY:  Worked previously at the [employer].  Denied tobacco, alcohol, or   illicit drug use.    FAMILY HISTORY:  Denied family history of colorectal cancer.    ALLERGIES:  CODEINE AND NEOSPORIN.    OUTPATIENT MEDICATIONS:  Levothyroxine,  simvastatin, amlodipine,   hydrochlorothiazide, multivitamins, calcium carbonate, vitamin B complex.    REVIEW OF SYSTEMS:  As per HPI, past medical history, past surgical history   sections, otherwise greater than 10 systems negative including constitutional,   head, ears, eyes, nose, throat, pulmonary, cardiovascular, GI, genitourinary,   hematological, rheumatological, psychiatric, neurological, musculoskeletal   female.    PHYSICAL EXAMINATION:  VITAL SIGNS:  Temperature 98.9, respirations 16, pulse 82, blood pressure   116/50, weight 190 pounds.  GENERAL:  Well-developed, well-nourished white female, appears pale, otherwise   alert and oriented.  HEENT:  Head, atraumatic, normocephalic.  Eyes, extraocular movements intact,   nonicteric sclerae.  Nose, no erythema or discharge.  Mouth, no erythema or   discharge, no thrush noted.  Mallampati of 2.  NECK:  Supple.  No lymphadenopathy or JVD.  PULMONARY:  Clear to auscultation bilaterally.  CARDIOVASCULAR:  Regular rate and rhythm.  ABDOMEN:  Nondistended.  Mildly tender in the periumbilical region, but no   rebound.  No appreciable hepatosplenomegaly, ascites, ecchymosis.  DERMATOLOGIC:  No spider angiomas.  No palmar erythema.  NEUROLOGICAL:  No focal deficits appreciated except for marked hearing loss.    No asterixis.  MUSCULOSKELETAL:  Moving all extremities.  Strength bilaterally equal   throughout.  PSYCHIATRIC:  Appropriate mood, affect, interaction, and insight.    LABORATORY DATA:  WBC 9.4, hemoglobin 8.5, hematocrit 30.3, MCV 92.7,   platelets 184.  Sodium 129, potassium 3.9, chloride 92, bicarbonate 32, BUN   10, creatinine 0.41, glucose 101.  GFR greater than 60, calcium 9.2.  LFTs   normal.  Serum osmolality is 2476.  INR is 1.04.  TSH is 1.610.  Blood type O   positive.    IMAGING:  Abdominal and pelvic CT scan 05/12/2019, no evidence of colonic   diverticulosis or bowel obstruction, extensive colonic diverticulosis noted,   fatty liver, bilateral  renal cysts, extensive atherosclerotic vascular   calcification, multiple thoracolumbar spine compression fractures.    IMPRESSION:  An 88-year-old  female with colonic diverticulosis and   personal history of multiple colon polyps, presents with maroon hematochezia   versus melena suggestive of upper gastrointestinal bleed resulting in anemia   from acute blood loss.    Differential diagnosis includes peptic ulcer disease, gastroduodenitis, reflux   esophagitis, gastrointestinal angiodysplasias, colorectal polyps,   diverticular bleed, hemorrhoidal bleeding versus others.    Comorbidities include advanced age of 88 years, hypothyroidism, hypertension,   colonic diverticulosis, GI bleeding, anemia as per above.    PROBLEMS:  1.  Hematochezia, maroon versus melena.  2.  Gastrointestinal bleeding.  3.  Anemia from acute blood loss.  4.  Colonic diverticulosis.  5.  Personal history of multiple colon polyps.    RECOMMENDATIONS:  1.  Clear liquid diet and n.p.o. after midnight.  2.  MoviPrep bowel preparation.  3.  I personally spoke to patient and patient's daughter, Sofia Araiza,   regarding informed consent for esophagogastroduodenoscopy and colonoscopy with   attempted hemostasis, biopsies, possible snare polypectomies and/or other   endotherapy.  Risks including but not limited to perforation, infection,   bleeding, missed lesions, possible need for surgery, cardiopulmonary event,   aspiration, stroke, hospitalization, possibly prolonged discomfort, possibly   repeat procedures, additional testing, hypoxia, in definitive diagnosis,   ineffective therapy and/or persistent bleeding and other potential   life-threatening complications.  Interactive discussion was undertaken in   layman's terms with both the patient and her daughter.  I personally called   and spoke to patient's daughter at her home phone number listed in our   electronic medical records.  I also asked her please sign the permit form when   she  came by to visit today and she stated understanding.  I answered their   questions in full to their satisfaction.  In particular, I also warrant   patient's daughter at patient's age if she had unanticipated adverse event or   complications that this could be life-threatening and that she may not survive   surgery if needed emergently.  They stated understanding.  Informed consent   was given by them in clear state of mind.  4.  I personally spoke to hospitalist physician extender to advise correction   of her electrolytes to try to optimize medical status before proceeding   tomorrow.    Dear Dr. Mary Gómez and Dr. Luh Dey,    Thank you for asking me to evaluate patient in consultation.  Please refer my   consult note as per above for details of recommendations.  Please feel free to   call us with any questions.       ____________________________________     TRACY MIKE MD    [facility] / [facility]    DD:  05/13/2019 08:12:51  DT:  05/13/2019 08:53:50    D#:  [id number]  Job#:  [id number]    cc: MARY GÓMEZ MD, RAEGAN SKY MD, LUH DEY MD

## 2019-05-13 NOTE — CARE PLAN
Problem: Infection  Goal: Will remain free from infection    Intervention: Implement standard precautions and perform hand washing before and after patient contact  Patient educated on hand hygiene and importance of cleanliness. Patient verbalizes understanding. RN performs appropriate hand hygiene and maintains aseptic technique / standard precautions when in contact with patient.      Problem: Knowledge Deficit  Goal: Knowledge of disease process/condition, treatment plan, diagnostic tests, and medications will improve    Intervention: Explain information regarding disease process/condition, treatment plan, diagnostic tests, and medications and document in education  Patient and family educated on POC, treatment plan, diagnostics tests, medications, diet, safety, activity, and encouraged to ask questions regarding care. Patient and daughter verbalize understanding, and participate in patient's care. Reoriented to call light for assistance. Hourly rounding in place.

## 2019-05-13 NOTE — CONSULTS
[facility] dictated [id number]. GIB, maroon hematochezia vs melena, will proceed with EGD/colonoscopy tomorrow.

## 2019-05-13 NOTE — PROGRESS NOTES
Admitted for rectal bleeding.    Seen pt, AOx 4. On cardiac monitor with SR w BBB. Denies any pain. BM x1 stool mostly with slight blood. Fluids on LR at 75 ml/hr. Plan of care discussed includes Safety, labs, cardiac monitoring, bleeding monitoin, H&H q8, NPO for bowel rest and possible procedure and pt understands.

## 2019-05-13 NOTE — ASSESSMENT & PLAN NOTE
CT abdomen and pelvis benign  Every 6 hemoglobin  GI consult  EGD and colonoscopy planned for 5/14/2019  Bowel prep for GI  N.p.o. at midnight  5/14: Plan for EGD and colonoscopy this morning.  5/15: EGD showed erosive gastritis and duodenitis without active bleeding.  Colonoscopy showed multiple polyps which were removed and sent for pathology and diverticular disease with no active bleeding as well.  PPI recommended.  Outpatient follow-up with GI.  H&H has been stable.  5/16: H&H continues to improve.  No further rectal bleeding.

## 2019-05-13 NOTE — PROGRESS NOTES
Report received from [facility]. Pt up to BSC with x1 assist. Pt voided and had large, bloody bowel movement. Clots and scant-minimal joão blood noted. Pericare performed and charlie cream applied by tech. Pt assisted back to bed; fall precautions in place and bed alarm on.

## 2019-05-13 NOTE — PROGRESS NOTES
Assessment completed. Pt A&Ox3. Very [place], no hearing aids available. Respirations are even and unlabored on 2L n/c. Pt denies pain at this time. Monitors applied, VS stable, call light and belongings within reach. POC updated (clear liquids, IV fluids, EGD/colonoscopy). Pt educated on room and call light, pt verbalized understanding. Communication board updated. Needs met.

## 2019-05-13 NOTE — ASSESSMENT & PLAN NOTE
Stable  Baseline cognition unclear  Stop HCTZ  Serum osmolality 276 which is borderline  Lipid panel ordered 5/13--normal  Albumin and total protein normal  Urine osm pending  TSH normal  Possible SIADH, hypokalemia  Continue to monitor.  Gradually improving.

## 2019-05-14 ENCOUNTER — ANESTHESIA EVENT (OUTPATIENT)
Dept: SURGERY | Facility: MEDICAL CENTER | Age: 84
DRG: 378 | End: 2019-05-14
Payer: MEDICARE

## 2019-05-14 ENCOUNTER — ANESTHESIA (OUTPATIENT)
Dept: SURGERY | Facility: MEDICAL CENTER | Age: 84
DRG: 378 | End: 2019-05-14
Payer: MEDICARE

## 2019-05-14 LAB
EKG IMPRESSION: NORMAL
HGB BLD-MCNC: 8.4 G/DL (ref 12–16)
HGB BLD-MCNC: 9 G/DL (ref 12–16)
HGB BLD-MCNC: 9.2 G/DL (ref 12–16)
HGB BLD-MCNC: 9.3 G/DL (ref 12–16)
PATHOLOGY CONSULT NOTE: NORMAL

## 2019-05-14 PROCEDURE — 0DBH8ZX EXCISION OF CECUM, VIA NATURAL OR ARTIFICIAL OPENING ENDOSCOPIC, DIAGNOSTIC: ICD-10-PCS | Performed by: INTERNAL MEDICINE

## 2019-05-14 PROCEDURE — 93010 ELECTROCARDIOGRAM REPORT: CPT | Performed by: INTERNAL MEDICINE

## 2019-05-14 PROCEDURE — 99226 PR SUBSEQUENT OBSERVATION CARE,LEVEL III: CPT | Performed by: FAMILY MEDICINE

## 2019-05-14 PROCEDURE — G0378 HOSPITAL OBSERVATION PER HR: HCPCS

## 2019-05-14 PROCEDURE — 700111 HCHG RX REV CODE 636 W/ 250 OVERRIDE (IP): Performed by: ANESTHESIOLOGY

## 2019-05-14 PROCEDURE — 700101 HCHG RX REV CODE 250: Performed by: ANESTHESIOLOGY

## 2019-05-14 PROCEDURE — 700101 HCHG RX REV CODE 250: Performed by: FAMILY MEDICINE

## 2019-05-14 PROCEDURE — 0DBK8ZX EXCISION OF ASCENDING COLON, VIA NATURAL OR ARTIFICIAL OPENING ENDOSCOPIC, DIAGNOSTIC: ICD-10-PCS | Performed by: INTERNAL MEDICINE

## 2019-05-14 PROCEDURE — 88312 SPECIAL STAINS GROUP 1: CPT

## 2019-05-14 PROCEDURE — 160009 HCHG ANES TIME/MIN: Performed by: INTERNAL MEDICINE

## 2019-05-14 PROCEDURE — 36415 COLL VENOUS BLD VENIPUNCTURE: CPT

## 2019-05-14 PROCEDURE — A9270 NON-COVERED ITEM OR SERVICE: HCPCS | Performed by: INTERNAL MEDICINE

## 2019-05-14 PROCEDURE — 160204 HCHG ENDO MINUTES - 1ST 30 MINS LEVEL 5: Performed by: INTERNAL MEDICINE

## 2019-05-14 PROCEDURE — 160002 HCHG RECOVERY MINUTES (STAT): Performed by: INTERNAL MEDICINE

## 2019-05-14 PROCEDURE — A9270 NON-COVERED ITEM OR SERVICE: HCPCS | Performed by: HOSPITALIST

## 2019-05-14 PROCEDURE — 160035 HCHG PACU - 1ST 60 MINS PHASE I: Performed by: INTERNAL MEDICINE

## 2019-05-14 PROCEDURE — 88305 TISSUE EXAM BY PATHOLOGIST: CPT | Mod: 59

## 2019-05-14 PROCEDURE — 700105 HCHG RX REV CODE 258: Performed by: HOSPITALIST

## 2019-05-14 PROCEDURE — 160048 HCHG OR STATISTICAL LEVEL 1-5: Performed by: INTERNAL MEDICINE

## 2019-05-14 PROCEDURE — 0DB98ZX EXCISION OF DUODENUM, VIA NATURAL OR ARTIFICIAL OPENING ENDOSCOPIC, DIAGNOSTIC: ICD-10-PCS | Performed by: INTERNAL MEDICINE

## 2019-05-14 PROCEDURE — 0DB68ZX EXCISION OF STOMACH, VIA NATURAL OR ARTIFICIAL OPENING ENDOSCOPIC, DIAGNOSTIC: ICD-10-PCS | Performed by: INTERNAL MEDICINE

## 2019-05-14 PROCEDURE — A9270 NON-COVERED ITEM OR SERVICE: HCPCS | Performed by: NURSE PRACTITIONER

## 2019-05-14 PROCEDURE — 700102 HCHG RX REV CODE 250 W/ 637 OVERRIDE(OP): Performed by: INTERNAL MEDICINE

## 2019-05-14 PROCEDURE — 85018 HEMOGLOBIN: CPT

## 2019-05-14 PROCEDURE — 700102 HCHG RX REV CODE 250 W/ 637 OVERRIDE(OP): Performed by: NURSE PRACTITIONER

## 2019-05-14 PROCEDURE — 160036 HCHG PACU - EA ADDL 30 MINS PHASE I: Performed by: INTERNAL MEDICINE

## 2019-05-14 PROCEDURE — 160209 HCHG ENDO MINUTES - EA ADDL 1 MIN LEVEL 5: Performed by: INTERNAL MEDICINE

## 2019-05-14 PROCEDURE — 0DBM8ZX EXCISION OF DESCENDING COLON, VIA NATURAL OR ARTIFICIAL OPENING ENDOSCOPIC, DIAGNOSTIC: ICD-10-PCS | Performed by: INTERNAL MEDICINE

## 2019-05-14 PROCEDURE — 700102 HCHG RX REV CODE 250 W/ 637 OVERRIDE(OP): Performed by: HOSPITALIST

## 2019-05-14 RX ORDER — METOPROLOL TARTRATE 1 MG/ML
1 INJECTION, SOLUTION INTRAVENOUS
Status: DISCONTINUED | OUTPATIENT
Start: 2019-05-14 | End: 2019-05-14 | Stop reason: HOSPADM

## 2019-05-14 RX ORDER — SODIUM CHLORIDE AND POTASSIUM CHLORIDE 150; 900 MG/100ML; MG/100ML
INJECTION, SOLUTION INTRAVENOUS CONTINUOUS
Status: DISCONTINUED | OUTPATIENT
Start: 2019-05-14 | End: 2019-05-16

## 2019-05-14 RX ORDER — SODIUM CHLORIDE, SODIUM LACTATE, POTASSIUM CHLORIDE, CALCIUM CHLORIDE 600; 310; 30; 20 MG/100ML; MG/100ML; MG/100ML; MG/100ML
INJECTION, SOLUTION INTRAVENOUS CONTINUOUS
Status: DISCONTINUED | OUTPATIENT
Start: 2019-05-14 | End: 2019-05-14 | Stop reason: HOSPADM

## 2019-05-14 RX ORDER — OMEPRAZOLE 20 MG/1
40 CAPSULE, DELAYED RELEASE ORAL DAILY
Status: DISCONTINUED | OUTPATIENT
Start: 2019-05-14 | End: 2019-05-17 | Stop reason: HOSPADM

## 2019-05-14 RX ORDER — MIDAZOLAM HYDROCHLORIDE 1 MG/ML
1 INJECTION INTRAMUSCULAR; INTRAVENOUS
Status: DISCONTINUED | OUTPATIENT
Start: 2019-05-14 | End: 2019-05-14 | Stop reason: HOSPADM

## 2019-05-14 RX ORDER — ONDANSETRON 2 MG/ML
4 INJECTION INTRAMUSCULAR; INTRAVENOUS
Status: DISCONTINUED | OUTPATIENT
Start: 2019-05-14 | End: 2019-05-14

## 2019-05-14 RX ORDER — HYDRALAZINE HYDROCHLORIDE 20 MG/ML
5 INJECTION INTRAMUSCULAR; INTRAVENOUS
Status: DISCONTINUED | OUTPATIENT
Start: 2019-05-14 | End: 2019-05-14 | Stop reason: HOSPADM

## 2019-05-14 RX ORDER — HALOPERIDOL 5 MG/ML
1 INJECTION INTRAMUSCULAR
Status: DISCONTINUED | OUTPATIENT
Start: 2019-05-14 | End: 2019-05-14 | Stop reason: HOSPADM

## 2019-05-14 RX ADMIN — OMEPRAZOLE 40 MG: 20 CAPSULE, DELAYED RELEASE ORAL at 13:12

## 2019-05-14 RX ADMIN — ACETAMINOPHEN 650 MG: 325 TABLET, FILM COATED ORAL at 23:05

## 2019-05-14 RX ADMIN — POTASSIUM CHLORIDE 20 MEQ: 1500 TABLET, EXTENDED RELEASE ORAL at 05:22

## 2019-05-14 RX ADMIN — GLYCOPYRROLATE 2 MG: 0.2 INJECTION INTRAMUSCULAR; INTRAVENOUS at 09:05

## 2019-05-14 RX ADMIN — LATANOPROST 1 DROP: 50 SOLUTION OPHTHALMIC at 17:25

## 2019-05-14 RX ADMIN — LEVOTHYROXINE SODIUM 112 MCG: 112 TABLET ORAL at 07:00

## 2019-05-14 RX ADMIN — FENTANYL CITRATE 50 MCG: 50 INJECTION, SOLUTION INTRAMUSCULAR; INTRAVENOUS at 08:50

## 2019-05-14 RX ADMIN — SODIUM CHLORIDE, POTASSIUM CHLORIDE, SODIUM LACTATE AND CALCIUM CHLORIDE: 600; 310; 30; 20 INJECTION, SOLUTION INTRAVENOUS at 01:09

## 2019-05-14 RX ADMIN — SIMVASTATIN 20 MG: 20 TABLET, FILM COATED ORAL at 17:23

## 2019-05-14 RX ADMIN — AMLODIPINE BESYLATE 2.5 MG: 5 TABLET ORAL at 05:23

## 2019-05-14 RX ADMIN — PROPOFOL 50 MG: 10 INJECTION, EMULSION INTRAVENOUS at 08:52

## 2019-05-14 RX ADMIN — MIDAZOLAM HYDROCHLORIDE 1 MG: 1 INJECTION, SOLUTION INTRAMUSCULAR; INTRAVENOUS at 08:51

## 2019-05-14 RX ADMIN — PROPOFOL 200 MCG/KG/MIN: 10 INJECTION, EMULSION INTRAVENOUS at 08:52

## 2019-05-14 RX ADMIN — METOPROLOL TARTRATE 25 MG: 25 TABLET ORAL at 17:23

## 2019-05-14 RX ADMIN — OXYBUTYNIN CHLORIDE 5 MG: 5 TABLET ORAL at 05:23

## 2019-05-14 RX ADMIN — POTASSIUM CHLORIDE AND SODIUM CHLORIDE: 900; 150 INJECTION, SOLUTION INTRAVENOUS at 18:51

## 2019-05-14 RX ADMIN — METOPROLOL TARTRATE 25 MG: 25 TABLET ORAL at 05:22

## 2019-05-14 RX ADMIN — OXYBUTYNIN CHLORIDE 5 MG: 5 TABLET ORAL at 17:23

## 2019-05-14 ASSESSMENT — ENCOUNTER SYMPTOMS
HEADACHES: 0
NAUSEA: 0
BRUISES/BLEEDS EASILY: 0
BLOOD IN STOOL: 1
HEARTBURN: 0
COUGH: 0
MYALGIAS: 0
ABDOMINAL PAIN: 0
TINGLING: 0
PALPITATIONS: 0
DOUBLE VISION: 0
WEIGHT LOSS: 0
VOMITING: 0
HEMOPTYSIS: 0
DEPRESSION: 0
FEVER: 0
CHILLS: 0
BLURRED VISION: 0
PHOTOPHOBIA: 0
NECK PAIN: 0

## 2019-05-14 ASSESSMENT — PAIN SCALES - GENERAL: PAIN_LEVEL: 1

## 2019-05-14 NOTE — ANESTHESIA QCDR
2019 [facility] Clinical Data Registry (for Quality Improvement)     Postoperative nausea/vomiting risk protocol (Adult = 18 yrs and Pediatric 3-17 yrs)- (430 and 463)  General inhalation anesthetic (NOT TIVA) with PONV risk factors: No  Provision of anti-emetic therapy with at least 2 different classes of agents: N/A  Patient DID NOT receive anti-emetic therapy and reason is documented in Medical Record: N/A    Multimodal Pain Management- (AQI59)  Patient undergoing Elective Surgery (i.e. Outpatient, or ASC, or Prescheduled Surgery prior to Hospital Admission): No  Use of Multimodal Pain Management, two or more drugs and/or interventions, NOT including systemic opioids: N/A  Exception: Documented allergy to multiple classes of analgesics: N/A    PACU assessment of acute postoperative pain prior to Anesthesia Care End- Applies to Patients Age = 18- (ABG7)  Initial PACU pain score is which of the following: < 7/10  Patient unable to report pain score: N/A    Post-anesthetic transfer of care checklist/protocol to PACU/ICU- (426 and 427)  Upon conclusion of case, patient transferred to which of the following locations: PACU/Non-ICU  Use of transfer checklist/protocol: Yes  Exclusion: Service Performed in Patient Hospital Room (and thus did not require transfer): N/A    PACU Reintubation- (AQI31)  General anesthesia requiring endotracheal intubation (ETT) along with subsequent extubation in OR or PACU: No  Required reintubation in the PACU: N/A  Extubation was a planned trial documented in the medical record prior to removal of the original airway device: N/A    Unplanned admission to ICU related to anesthesia service up through end of PACU care- (MD51)  Unplanned admission to ICU (not initially anticipated at anesthesia start time): No

## 2019-05-14 NOTE — PROGRESS NOTES
Pt back on unit from EGD/colonoscopy. VSS, post-op vitals in place. Monitor room notified. Water given per request.

## 2019-05-14 NOTE — ANESTHESIA POSTPROCEDURE EVALUATION
Patient: Марина Giron    Procedure Summary     Date:  05/14/19 Room / Location:  [place] [address] / SURGERY [facility]    Anesthesia Start:  0849 Anesthesia Stop:  0925    Procedures:       GASTROSCOPY (N/A Esophagus)      COLONOSCOPY (N/A Anus) Diagnosis:  (colon polyps, gastric duodenitis)    Surgeon:  Elvis Cunningham M.D. Responsible Provider:      Anesthesia Type:  general ASA Status:  2          Final Anesthesia Type: general  Last vitals  BP   Blood Pressure : 140/62, NIBP: 130/55    Temp   36.4 °C (97.6 °F)    Pulse   Pulse: 78, Heart Rate (Monitored): 84   Resp   (!) 24    SpO2   98 %      Anesthesia Post Evaluation    Patient location during evaluation: PACU  Patient participation: complete - patient participated  Level of consciousness: awake and alert  Pain score: 1    Airway patency: patent  Anesthetic complications: no  Cardiovascular status: hemodynamically stable  Respiratory status: acceptable  Hydration status: euvolemic    PONV: none           Nurse Pain Score: 0 (NPRS)

## 2019-05-14 NOTE — ANESTHESIA PREPROCEDURE EVALUATION
Relevant Problems   (+) Hypothyroid       Physical Exam    Airway   Mallampati: II  TM distance: >3 FB  Neck ROM: full       Cardiovascular - normal exam  Rhythm: regular  Rate: normal  (-) murmur     Dental - normal exam  (+) upper dentures, lower dentures         Pulmonary - normal exam  Breath sounds clear to auscultation     Abdominal    Neurological - normal exam                 Anesthesia Plan    ASA 2       Plan - general             Induction: intravenous    Postoperative Plan: Postoperative administration of opioids is intended.    Pertinent diagnostic labs and testing reviewed    Informed Consent:    Anesthetic plan and risks discussed with patient.    Use of blood products discussed with: patient whom consented to blood products.

## 2019-05-14 NOTE — ANESTHESIA TIME REPORT
Anesthesia Start and Stop Event Times     Date Time Event    5/14/2019 0849 Anesthesia Start     0925 Anesthesia Stop        Responsible Staff    No responsible staff documented.       Preop Diagnosis (Free Text):  Pre-op Diagnosis     GI bleeding        Preop Diagnosis (Codes):  Diagnosis Information     Diagnosis Code(s):         Post op Diagnosis  GI bleed      Premium Reason  Non-Premium    Comments:

## 2019-05-14 NOTE — DISCHARGE PLANNING
Anticipated Discharge Disposition: Home with family.    Action: Pt discussed in IDT rounds. MD to order PT/OT eval orders to assist with dc planning.    Barriers to Discharge: medical clearance.    Plan: Assist with any dc needs that may be identified.

## 2019-05-14 NOTE — ANESTHESIA PREPROCEDURE EVALUATION
Relevant Problems   (+) Hypothyroid       Physical Exam    Airway   Mallampati: II  TM distance: >3 FB  Neck ROM: full       Cardiovascular - normal exam  Rhythm: regular  Rate: normal  (-) murmur     Dental - normal exam  (+) upper dentures, lower dentures         Pulmonary - normal exam  Breath sounds clear to auscultation     Abdominal    Neurological - normal exam                 Anesthesia Plan        Plan - general       Airway plan will be natural airway        Induction: intravenous    Postoperative Plan: Postoperative administration of opioids is intended.    Pertinent diagnostic labs and testing reviewed    Informed Consent:    Anesthetic plan and risks discussed with patient.    Use of blood products discussed with: patient whom consented to blood products.

## 2019-05-14 NOTE — PROGRESS NOTES
Pt resting in bed at this time. Even visible chest rise. No signs of distress. Bed alarm on. Call light in place. Bed in low and locked position.

## 2019-05-14 NOTE — PROGRESS NOTES
Pt off unit to pre-op for EDG and colonoscopy with ACLS RN, transport, and monitor. Monitor room notified.

## 2019-05-14 NOTE — OR NURSING
"Pt. C/O \"left elbow pain\",elbow appears normal and pt. with good ROM,brisk cap.refill,move fingers well.Left arm elevated over pillow and ice pack applied over elbow area.Dr. Cunningham was aware of complaint earlier when at bedsidePt. tolerating sips of water well..  "

## 2019-05-14 NOTE — PROGRESS NOTES
Patient seen and examined prior to esophagogastroduodenoscopy and colonoscopy with attempted hemostasis, biopsies, possible snare polypectomies and/or other endotherapy with anesthesia.  Risks, benefits and alternative were discussed with patient again.  Risks, benefits, and alternatives of aforementioned procedures were again discussed with patient.  Consenting person was given opportunities to ask questions and discuss other options.  Risks including but not limited to perforation, infection, bleeding, missed lesion(s), indefinite diagnosis, cardiac and/or pulmonary event, aspiration, hypoxia, stroke, possible need for surgery and/or interventional radiology, hospitalization possibly prolonged, discomfort, unsuccessful and/or incomplete procedure, indefinite diagnosis, ineffective therapy and/or persistent symptoms, possible need for repeat procedures and/or additional testings, damage to adjacent organs and/or vascular structures, medication reaction, and other adverse events possibly life-threatening.  Interactive discussion was undertaken with Layman's terms.  Patient and daughter both signed permit form.  Consenting persons stated understanding and acceptance of these risks, and wished to proceed.  Informed consent was given in clear state of mind.

## 2019-05-14 NOTE — PROCEDURES
Pre-procedure Diagnoses:   Hematochezia [K92.1]   Melena [K92.1]   Anemia associated with acute blood loss [D62]   History of colonic polyps [Z86.010]   Post-procedure Diagnoses:   Gastroduodenitis with bleeding [K29.91]   Polyp of ascending colon, unspecified type [D12.2]   Polyp of cecum [D12.0]   Polyp of descending colon, unspecified type [D12.4]   Colon, diverticulosis [K57.30]   Grade I hemorrhoids [K64.0]   Procedures:   COLONOSCOPY WITH SNARE POLYPECTOMIES [59580 (CPT®)]   ESOPHAGOGASTRODUODENOSCOPY OR UPPER GI ENDOSCOPY WITH BIOPSIES [87036 (CPT®)]     [facility]    Monitored anesthesia care: Dr. Jeremy Rizvi    Consent: Risks, benefits and alternative were discussed with patient again.  Risks, benefits, and alternatives of aforementioned procedures were again discussed with patient.  Consenting person was given opportunities to ask questions and discuss other options.  Risks including but not limited to perforation, infection, bleeding, missed lesion(s), indefinite diagnosis, cardiac and/or pulmonary event, aspiration, hypoxia, stroke, possible need for surgery and/or interventional radiology, hospitalization possibly prolonged, discomfort, unsuccessful and/or incomplete procedure, indefinite diagnosis, ineffective therapy and/or persistent symptoms, possible need for repeat procedures and/or additional testings, damage to adjacent organs and/or vascular structures, medication reaction, and other adverse events possibly life-threatening.  Interactive discussion was undertaken with Layman's terms.  Patient and daughter both signed permit form.  I spoke to patient's daughter (Sofia Araiza) yesterday to obtain consent.  Consenting persons stated understanding and acceptance of these risks, and wished to proceed.  Informed consent was given in clear state of mind.    Endoscopic procedures in detail:   Diagnostic endoscope was inserted from mouth into second portion of the duodenum,  retroflexion was performed in the stomach.  Gastric and duodenal biopsies were obtained.There was suction of insufflated air and stomach fluid contents upon removal.      Adult long-variable stiffness colonoscopy was inserted into anus and rectum, retroflexion was performed in rectum, colonoscope was advanced to cecum, appendiceal orifice and ileocecal valve were identified, terminal ileum was intubated and inspected, water flush was used to wash mucosa and suction of colonic fluid contents was performed to optimize visualization.  I performed photodocumentation of the terminal ileum, cecum, appendical orifice, ileocecal value, retroflexion view in rectum and anus channel. Multiple polyps were snared without cautery, specimens were retrieved and sent to pathology in separate containers.  There was suction of insufflated air and fluid contents upon removal.     Procedure times:  - In-room 08:49  - EGD start 08:55  - EGD completed 09:00  - Colonoscopy start 09:03  - Cecal intubation: 09:06  - Colonoscopy completed 09:15  - Out of room per nursing records    Esophagogastroduodenoscopy Findings:  - Esophagus: endoscopically unremarkable.   - Stomach: non-erosive gastritis of moderate severity, biopsied to evaluate H.pylori status.   - Duodenum: mild bulbar erythematous duodenitis, biopsied to exclude celiac sprue.    Colonoscopy Findings:  - Bowel preparation: excellent.  - Terminal ileum: endoscopically unremarkable.  - Colon: Diffuse colonic diverticulosis, medium-sized lumen, severe.  One cecal polyp, four ascending polyps and descending polyps; sessile, ranged in size from 3 mm up to 1.1cm, removed and retrieved.  - Rectum: non-bleeding grade 1 internal hemorrhoids.    Impression:  1. Ascending colon polyps likely etiology of prior maroon hematochezia.  2. Multiple colon polyps, snared and removed.  3. Erosive gastroduodenitis  4. Colonic diverticulosis without bleeding  5. Internal hemorrhoids, grade  1    Recommendations:   1.  Routine post-endoscopy anesthesia recovery care.  Transfer patient back to prior hospital room when she is awake, alert and comfortable, when recovery criteria are met.  Aspiration and fall precautions x 24 hours.   2.  I plan to send patient a pathology letter with final results and any further recommendations.  3.  Avoid NSAIDs, fish-oil and blood-thinners for 8 weeks.  4.  Prilosec 40mg PO QD x 21 days.  5.  High-fiber cardiac diet.  6.  No recall needed due to age.  7.  I spoke to patient and recovery nurse about impression, diagnosis and recommendations.

## 2019-05-14 NOTE — PROGRESS NOTES
Bedside report received from night nurse. Assumed care of pt. Pt awake, laying in bed. A/Ox4, but pt is lethargic and hard of hearing. VSS. No complaints at this time. POC reviewed and white board updated. Tele box on. Call light in reach. Bed locked in lowest position with 2 upper bed rails up. Bed alarm plugged in, in now on, and is placed correctly under pt. Saturating well on 2L nasal cannula.

## 2019-05-14 NOTE — CARE PLAN
Problem: Skin Integrity  Goal: Risk for impaired skin integrity will decrease  Outcome: PROGRESSING AS EXPECTED  Use of barrier wipes and cream to reduce the risk of skin breakdown. Hourly rounding on pt to check on episodes of incontinence.     Problem: Knowledge Deficit  Goal: Knowledge of disease process/condition, treatment plan, diagnostic tests, and medications will improve  Outcome: PROGRESSING AS EXPECTED  Pt educated about disease process. Reason why medications are taken. And informed about treatment plan. Pt educated on importance of completing bowel prep.

## 2019-05-15 ENCOUNTER — APPOINTMENT (OUTPATIENT)
Dept: RADIOLOGY | Facility: MEDICAL CENTER | Age: 84
DRG: 378 | End: 2019-05-15
Attending: HOSPITALIST
Payer: MEDICARE

## 2019-05-15 LAB
ALBUMIN SERPL BCP-MCNC: 2.9 G/DL (ref 3.2–4.9)
ALBUMIN/GLOB SERPL: 1.3 G/DL
ALP SERPL-CCNC: 44 U/L (ref 30–99)
ALT SERPL-CCNC: 11 U/L (ref 2–50)
ANION GAP SERPL CALC-SCNC: 5 MMOL/L (ref 0–11.9)
AST SERPL-CCNC: 21 U/L (ref 12–45)
BILIRUB SERPL-MCNC: 0.3 MG/DL (ref 0.1–1.5)
BUN SERPL-MCNC: 8 MG/DL (ref 8–22)
CALCIUM SERPL-MCNC: 8.6 MG/DL (ref 8.5–10.5)
CHLORIDE SERPL-SCNC: 102 MMOL/L (ref 96–112)
CO2 SERPL-SCNC: 27 MMOL/L (ref 20–33)
CREAT SERPL-MCNC: 0.49 MG/DL (ref 0.5–1.4)
GLOBULIN SER CALC-MCNC: 2.3 G/DL (ref 1.9–3.5)
GLUCOSE SERPL-MCNC: 98 MG/DL (ref 65–99)
HGB BLD-MCNC: 8.1 G/DL (ref 12–16)
HGB BLD-MCNC: 8.8 G/DL (ref 12–16)
POTASSIUM SERPL-SCNC: 3.8 MMOL/L (ref 3.6–5.5)
PROT SERPL-MCNC: 5.2 G/DL (ref 6–8.2)
SODIUM SERPL-SCNC: 134 MMOL/L (ref 135–145)

## 2019-05-15 PROCEDURE — A9270 NON-COVERED ITEM OR SERVICE: HCPCS | Performed by: NURSE PRACTITIONER

## 2019-05-15 PROCEDURE — 700101 HCHG RX REV CODE 250: Performed by: FAMILY MEDICINE

## 2019-05-15 PROCEDURE — 85018 HEMOGLOBIN: CPT | Mod: 91

## 2019-05-15 PROCEDURE — 700102 HCHG RX REV CODE 250 W/ 637 OVERRIDE(OP): Performed by: HOSPITALIST

## 2019-05-15 PROCEDURE — A9270 NON-COVERED ITEM OR SERVICE: HCPCS | Performed by: INTERNAL MEDICINE

## 2019-05-15 PROCEDURE — 770006 HCHG ROOM/CARE - MED/SURG/GYN SEMI*

## 2019-05-15 PROCEDURE — 97166 OT EVAL MOD COMPLEX 45 MIN: CPT

## 2019-05-15 PROCEDURE — 99233 SBSQ HOSP IP/OBS HIGH 50: CPT | Performed by: FAMILY MEDICINE

## 2019-05-15 PROCEDURE — 97535 SELF CARE MNGMENT TRAINING: CPT

## 2019-05-15 PROCEDURE — 700102 HCHG RX REV CODE 250 W/ 637 OVERRIDE(OP): Performed by: NURSE PRACTITIONER

## 2019-05-15 PROCEDURE — 80053 COMPREHEN METABOLIC PANEL: CPT

## 2019-05-15 PROCEDURE — 36415 COLL VENOUS BLD VENIPUNCTURE: CPT

## 2019-05-15 PROCEDURE — 71045 X-RAY EXAM CHEST 1 VIEW: CPT

## 2019-05-15 PROCEDURE — 700102 HCHG RX REV CODE 250 W/ 637 OVERRIDE(OP): Performed by: INTERNAL MEDICINE

## 2019-05-15 PROCEDURE — A9270 NON-COVERED ITEM OR SERVICE: HCPCS | Performed by: HOSPITALIST

## 2019-05-15 PROCEDURE — 97162 PT EVAL MOD COMPLEX 30 MIN: CPT

## 2019-05-15 RX ORDER — HYDROCORTISONE ACETATE 25 MG/1
25 SUPPOSITORY RECTAL EVERY 12 HOURS
Status: DISCONTINUED | OUTPATIENT
Start: 2019-05-15 | End: 2019-05-17 | Stop reason: HOSPADM

## 2019-05-15 RX ADMIN — OXYBUTYNIN CHLORIDE 5 MG: 5 TABLET ORAL at 17:38

## 2019-05-15 RX ADMIN — LEVOTHYROXINE SODIUM 112 MCG: 112 TABLET ORAL at 05:09

## 2019-05-15 RX ADMIN — SIMVASTATIN 20 MG: 20 TABLET, FILM COATED ORAL at 17:37

## 2019-05-15 RX ADMIN — POTASSIUM CHLORIDE 20 MEQ: 1500 TABLET, EXTENDED RELEASE ORAL at 05:09

## 2019-05-15 RX ADMIN — METOPROLOL TARTRATE 25 MG: 25 TABLET ORAL at 17:38

## 2019-05-15 RX ADMIN — HYDROCORTISONE ACETATE 25 MG: 25 SUPPOSITORY RECTAL at 19:36

## 2019-05-15 RX ADMIN — OXYBUTYNIN CHLORIDE 5 MG: 5 TABLET ORAL at 05:08

## 2019-05-15 RX ADMIN — POTASSIUM CHLORIDE AND SODIUM CHLORIDE: 900; 150 INJECTION, SOLUTION INTRAVENOUS at 07:30

## 2019-05-15 RX ADMIN — SENNOSIDES, DOCUSATE SODIUM 2 TABLET: 50; 8.6 TABLET, FILM COATED ORAL at 17:38

## 2019-05-15 RX ADMIN — POTASSIUM CHLORIDE AND SODIUM CHLORIDE: 900; 150 INJECTION, SOLUTION INTRAVENOUS at 19:38

## 2019-05-15 RX ADMIN — OMEPRAZOLE 40 MG: 20 CAPSULE, DELAYED RELEASE ORAL at 05:10

## 2019-05-15 RX ADMIN — AMLODIPINE BESYLATE 2.5 MG: 5 TABLET ORAL at 05:09

## 2019-05-15 RX ADMIN — METOPROLOL TARTRATE 25 MG: 25 TABLET ORAL at 05:10

## 2019-05-15 RX ADMIN — LATANOPROST 1 DROP: 50 SOLUTION OPHTHALMIC at 17:37

## 2019-05-15 ASSESSMENT — COGNITIVE AND FUNCTIONAL STATUS - GENERAL
DRESSING REGULAR LOWER BODY CLOTHING: A LITTLE
SUGGESTED CMS G CODE MODIFIER DAILY ACTIVITY: CK
SUGGESTED CMS G CODE MODIFIER MOBILITY: CL
MOBILITY SCORE: 14
DRESSING REGULAR UPPER BODY CLOTHING: A LITTLE
STANDING UP FROM CHAIR USING ARMS: A LITTLE
PERSONAL GROOMING: A LITTLE
CLIMB 3 TO 5 STEPS WITH RAILING: A LOT
DAILY ACTIVITIY SCORE: 19
HELP NEEDED FOR BATHING: A LITTLE
WALKING IN HOSPITAL ROOM: A LITTLE
TOILETING: A LITTLE
MOVING TO AND FROM BED TO CHAIR: A LOT
MOVING FROM LYING ON BACK TO SITTING ON SIDE OF FLAT BED: A LOT
TURNING FROM BACK TO SIDE WHILE IN FLAT BAD: A LOT

## 2019-05-15 ASSESSMENT — ENCOUNTER SYMPTOMS
HEMOPTYSIS: 0
TREMORS: 0
BRUISES/BLEEDS EASILY: 0
VOMITING: 0
CHILLS: 0
ORTHOPNEA: 0
FEVER: 0
COUGH: 0
HEADACHES: 0
HEARTBURN: 0
ABDOMINAL PAIN: 0
BLOOD IN STOOL: 0
BLURRED VISION: 0
NECK PAIN: 0
DOUBLE VISION: 0
WEIGHT LOSS: 0
PALPITATIONS: 0
NAUSEA: 0
MYALGIAS: 0
PHOTOPHOBIA: 0
TINGLING: 0
DEPRESSION: 0
BLOOD IN STOOL: 1

## 2019-05-15 ASSESSMENT — GAIT ASSESSMENTS
GAIT LEVEL OF ASSIST: CONTACT GUARD ASSIST
DISTANCE (FEET): 20
ASSISTIVE DEVICE: FRONT WHEEL WALKER
DEVIATION: STEP TO;DECREASED BASE OF SUPPORT;DECREASED HEEL STRIKE;DECREASED TOE OFF;OTHER (COMMENT)

## 2019-05-15 ASSESSMENT — ACTIVITIES OF DAILY LIVING (ADL): TOILETING: INDEPENDENT

## 2019-05-15 ASSESSMENT — LIFESTYLE VARIABLES: SUBSTANCE_ABUSE: 0

## 2019-05-15 NOTE — CARE PLAN
Problem: Communication  Goal: The ability to communicate needs accurately and effectively will improve    Intervention: Reorient patient to environment as needed   05/14/19 5220   OTHER   Oriented to: All of the Following : Location of Bathroom, Visiting Policy, Unit Routine, Call Light and Bedside Controls, Bedside Rail Policy, Smoking Policy, Rights and Responsibilities, Bedside Report, and Patient Education Notebook         Problem: Respiratory:  Goal: Respiratory status will improve    Intervention: Assess and monitor pulmonary status   05/14/19 1715 05/14/19 1936 05/14/19 2000   Vitals   Respiration --  19 --    Pulse Oximetry --  91 % --    OTHER   O2 (LPM) 2 --  --    RUL Breath Sounds --  --  Diminished   RML Breath Sounds --  --  Diminished   RLL Breath Sounds --  --  Diminished   ZAIN Breath Sounds --  --  Clear   LLL Breath Sounds --  --  Diminished

## 2019-05-15 NOTE — THERAPY
"Physical Therapy Evaluation completed.   Bed Mobility:  Supine to Sit:  (found up with OT in bathroom)  Transfers: Sit to Stand: Minimal Assist  Gait: Level Of Assist: Contact Guard Assist with Front-Wheel Walker      Plan of Care: Will benefit from Physical Therapy 3 times per week  Discharge Recommendations: Equipment: Will Continue to Assess for Equipment Needs. See below    Pt presents to PT with impaired balance, gait and general locomotion associated with deconditioning and medical co-morbidities. She was able to demonstrate sit<>stands with min A as well as short distance ambulation with CGA/min A with FWW. She requires notably increased effort with ambulatory activity and general locomotion. She may be close to her functional baseline, however she is at increased risk for falls and query pt's ability to effectively care for self in current condition. Would currently recommend continued skilled PT/placement prior to dc to home for optimal functional progression and return to I living as able. However if she is near baseline she may require additional assist at home and/or long term solutions for IADl/ADl management (though would defer to OT recs for dc planning in this regard). Spoke with SWATHI re: concerns and dc planning and may need family input as well regarding long term plans.     See \"Rehab Therapy-Acute\" Patient Summary Report for complete documentation.     "

## 2019-05-15 NOTE — CARE PLAN
Problem: Safety  Goal: Will remain free from injury  Outcome: PROGRESSING AS EXPECTED    Goal: Will remain free from falls  Outcome: PROGRESSING AS EXPECTED      Problem: Bowel/Gastric:  Goal: Normal bowel function is maintained or improved  Outcome: PROGRESSING AS EXPECTED    Goal: Will not experience complications related to bowel motility  Outcome: PROGRESSING AS EXPECTED      Problem: Skin Integrity  Goal: Risk for impaired skin integrity will decrease  Outcome: PROGRESSING AS EXPECTED      Problem: Communication  Goal: The ability to communicate needs accurately and effectively will improve  Outcome: PROGRESSING AS EXPECTED      Problem: Infection  Goal: Will remain free from infection  Outcome: PROGRESSING AS EXPECTED      Problem: Venous Thromboembolism (VTW)/Deep Vein Thrombosis (DVT) Prevention:  Goal: Patient will participate in Venous Thrombosis (VTE)/Deep Vein Thrombosis (DVT)Prevention Measures  Outcome: PROGRESSING AS EXPECTED      Problem: Knowledge Deficit  Goal: Knowledge of disease process/condition, treatment plan, diagnostic tests, and medications will improve  Outcome: PROGRESSING AS EXPECTED    Goal: Knowledge of the prescribed therapeutic regimen will improve  Outcome: PROGRESSING AS EXPECTED      Problem: Discharge Barriers/Planning  Goal: Patient's continuum of care needs will be met  Outcome: PROGRESSING AS EXPECTED      Problem: Respiratory:  Goal: Respiratory status will improve  Outcome: PROGRESSING AS EXPECTED      Problem: Urinary Elimination:  Goal: Ability to reestablish a normal urinary elimination pattern will improve  Outcome: PROGRESSING AS EXPECTED      Problem: Pain Management  Goal: Pain level will decrease to patient's comfort goal  Outcome: PROGRESSING AS EXPECTED      Problem: Mobility  Goal: Risk for activity intolerance will decrease  Outcome: PROGRESSING AS EXPECTED

## 2019-05-15 NOTE — DISCHARGE PLANNING
PMR referral from Dr. Miller     Debility secondary to GI bleed. Observation status. Please have PT evaluate. Based on current review anticipate home with home health versus skilled nursing. Will follow. No physiatry consult ordered at this time.

## 2019-05-15 NOTE — DISCHARGE PLANNING
Anticipated Discharge Disposition: SNF vs HHC.     Action: Discussed pt's case with bedside RN, pt is sound asleep and dtr was in visiting earlier but has gone home now. Awaiting PT eval, OT eval completed and recommendation is home with HHC vs SNF. LSW attempted to call pt's dtr Sofia #[phone] and left a message for a call back to discuss d/c planning. Discussed with attending MD, he will also place rehab consult and call [facility] RN to see if he can roll pt inpatient as pt is currently in OBS status.     Case discussed with attending MD, bedside RN and charge RN.     Barriers to Discharge: N/A.     Plan: As above, await a call back from pt's dtr to complete full assessment and discuss d/c planning. Attending MD to reach out to [facility] RN to see if pt meets inpatient criteria. MD to place rehab consult. Pending PT eval and further recommendations.

## 2019-05-15 NOTE — PROGRESS NOTES
[facility] Medicine Daily Progress Note    Date of Service  5/14/2019    Chief Complaint  88 y.o. female admitted 5/12/2019 with rectal bleeding and acute blood loss anemia    Hospital Course    This is an 88-year-old female admitted 5/12/2019 with complaints of bright red blood per rectum.  She is extremely hard of hearing and communication is limited precluding a more detailed history.  She was anemic at presentation at 10.2/30.3, MCV of 92 and every 6 hemoglobins have shown drops to 8.8, and 8.5.  Sodium was low at presentation at 129, with low chloride at 89 and elevated glucose at 116.  Remainder of her chemistry profile was benign.  Serum osmolality was 276 suggesting isotonic hyponatremia.  Workup has been ongoing.  Gastroenterology consultation with the patient's outpatient provider occurred in the ER.  Dr. Cunningham has evaluated the patient in the observation unit with plans to take the patient for EGD, colonoscopy as there is questionable reports of maroon/black stools as well.      Interval Problem Update  Patient is A/O x4, but extremely hard of hearing.  Her affect is lethargic and slowed she denies pain with the exception of her right knee during her history, but is markedly tender to the left upper and lower quadrant on exam.  She is a poor and unreliable historian and further details are unclear.  Hyponatremia--stable at 129  Hypokalemia of 3.3  Mag and phosphorus normal  Lipid panel is normal  INR and PTT normal  CT scan of the abdomen and pelvis without evidence of acute intra-abdominal pathology  5/14: Laying in bed comfortably.  Has been n.p.o. since midnight for EGD and colonoscopy scheduled for this morning.  No further hematochezia reported.  No chest pain or shortness of breath.  No acute distress noted.  No new issues overnight per staff.  Consultants/Specialty  Gastroenterology    Code Status  Full    Disposition  Pending clinical course.  Pending PT/OT eval    Review of Systems  Review of  Systems   Unable to perform ROS: Mental acuity   Constitutional: Negative for chills, fever and weight loss.   HENT: Negative for ear pain, hearing loss and tinnitus.    Eyes: Negative for blurred vision, double vision and photophobia.   Respiratory: Negative for cough and hemoptysis.    Cardiovascular: Negative for chest pain and palpitations.   Gastrointestinal: Positive for blood in stool. Negative for abdominal pain, heartburn, nausea and vomiting.   Genitourinary: Negative for dysuria and urgency.   Musculoskeletal: Negative for myalgias and neck pain.   Neurological: Negative for tingling and headaches.   Endo/Heme/Allergies: Does not bruise/bleed easily.   Psychiatric/Behavioral: Negative for depression and suicidal ideas.        Physical Exam  Temp:  [35.9 °C (96.6 °F)-37.2 °C (99 °F)] 36.9 °C (98.5 °F)  Pulse:  [70-96] 96  Resp:  [15-28] 24  BP: (101-151)/(54-81) 138/70  SpO2:  [90 %-99 %] 93 %    Physical Exam   Constitutional: She is oriented to person, place, and time. She appears well-developed and well-nourished. No distress.   HENT:   Head: Normocephalic and atraumatic.   Mouth/Throat: No oropharyngeal exudate.   Eyes: Pupils are equal, round, and reactive to light. EOM are normal. No scleral icterus.   Neck: Normal range of motion. Neck supple.   Cardiovascular: Normal rate and regular rhythm.  Exam reveals no gallop and no friction rub.    Pulmonary/Chest: Effort normal and breath sounds normal. No respiratory distress. She has no wheezes.   Abdominal: Soft. Normal appearance. She exhibits no distension. There is tenderness in the left upper quadrant and left lower quadrant. There is no rigidity and no rebound.   Neurological: She is alert and oriented to person, place, and time. No cranial nerve deficit.   Skin: Skin is warm and dry. She is not diaphoretic. There is pallor.   Psychiatric: She has a normal mood and affect. Her speech is normal. She is slowed. Cognition and memory are normal.        Fluids    Intake/Output Summary (Last 24 hours) at 05/14/19 1808  Last data filed at 05/14/19 1030   Gross per 24 hour   Intake              900 ml   Output                0 ml   Net              900 ml       Laboratory  Recent Labs      05/12/19   1623   05/14/19   0228  05/14/19   1110  05/14/19   1409   WBC  9.4   --    --    --    --    RBC  3.27*   --    --    --    --    HEMOGLOBIN  10.2*   < >  9.3*  9.0*  9.2*   HEMATOCRIT  30.3*   --    --    --    --    MCV  92.7   --    --    --    --    MCH  31.2   --    --    --    --    MCHC  33.7   --    --    --    --    RDW  46.5   --    --    --    --    PLATELETCT  184   --    --    --    --    MPV  9.1   --    --    --    --     < > = values in this interval not displayed.     Recent Labs      05/12/19   1623  05/13/19   0447   SODIUM  129*  129*   POTASSIUM  3.6  3.3*   CHLORIDE  89*  92*   CO2  33  32   GLUCOSE  116*  101*   BUN  16  10   CREATININE  0.62  0.41*   CALCIUM  10.0  9.2     Recent Labs      05/12/19   1623   APTT  27.8   INR  1.04         Recent Labs      05/13/19   0447   TRIGLYCERIDE  51   HDL  52   LDL  53       Imaging  CT-ABDOMEN-PELVIS WITH   Final Result      1.  No evidence of bowel obstruction or focal inflammatory change.      2.  Extensive colonic diverticulosis.      3.  Fatty liver.      4.  Bilateral renal cysts.      5.  Extensive atherosclerotic vascular calcification.      6.  Multiple age indeterminate thoracolumbar spine compression fractures.           Assessment/Plan  * Rectal bleed   Assessment & Plan    CT abdomen and pelvis benign  Every 6 hemoglobin  GI consult  EGD and colonoscopy planned for 5/14/2019  Bowel prep for GI  N.p.o. at midnight  5/14: Plan for EGD and colonoscopy this morning.     Hyponatremia   Assessment & Plan    Stable  Baseline cognition unclear  Stop HCTZ  Serum osmolality 276 which is borderline  Lipid panel ordered 5/13--normal  Albumin and total protein normal  Urine osm pending  TSH  normal  Possible SIADH, hypokalemia  Continue to monitor.       Acute encephalopathy   Assessment & Plan    Etiology/duration unclear  Feels that patient close to baseline.         HLD (hyperlipidemia)   Assessment & Plan    Okay to continue home Zocor.     Hypothyroid- (present on admission)   Assessment & Plan    This is chronic,   TSH euthyroid     Benign essential HTN- (present on admission)   Assessment & Plan    Okay to continue home metoprolol, Norvasc  Monitor     Plan of care discussed with multidisciplinary team during rounds.    VTE prophylaxis: SCDs

## 2019-05-15 NOTE — THERAPY
"Occupational Therapy Evaluation completed.   Functional Status:    Pleasant 89 y/o female admitted with rectal bleeding, s/p colonoscopy. Pt completed bed mobility with Min A, sit><stand with Min A. Pt ambulated to the bathroom using FWW. No LOB, but very slow moving. Descended onto toilet with Min A, no grab bar available. Completed pericare supv. After being on toilet for ~10 mins, pt reported feeling lightheaded, however her BP on return was 137/68. Mod A for ascent from toilet. Pt ambulated back into room, agreeable to sit in chair for a while. Pt is very [place]. Pt may be close to baseline, however she could benefit from post-acute therapy following D/C to improve independence and safety prior to going home, as she lives alone. Will continue working with pt in this setting, hopefully will have opportunity to speak with her daughter.     Plan of Care: Will benefit from Occupational Therapy 3 times per week  Discharge Recommendations:  Equipment: Will Continue to Assess for Equipment Needs. Post-acute therapy Recommend inpatient transitional care services for continued occupational therapy services VS home with HH and family assist      See \"Rehab Therapy-Acute\" Patient Summary Report for complete documentation.    "

## 2019-05-16 PROBLEM — R53.1 GENERALIZED WEAKNESS: Status: ACTIVE | Noted: 2019-05-16

## 2019-05-16 LAB
BASOPHILS # BLD AUTO: 0.2 % (ref 0–1.8)
BASOPHILS # BLD: 0.02 K/UL (ref 0–0.12)
EOSINOPHIL # BLD AUTO: 0.13 K/UL (ref 0–0.51)
EOSINOPHIL NFR BLD: 1.4 % (ref 0–6.9)
ERYTHROCYTE [DISTWIDTH] IN BLOOD BY AUTOMATED COUNT: 50.2 FL (ref 35.9–50)
HCT VFR BLD AUTO: 29 % (ref 37–47)
HGB BLD-MCNC: 9.2 G/DL (ref 12–16)
IMM GRANULOCYTES # BLD AUTO: 0.03 K/UL (ref 0–0.11)
IMM GRANULOCYTES NFR BLD AUTO: 0.3 % (ref 0–0.9)
LYMPHOCYTES # BLD AUTO: 1.06 K/UL (ref 1–4.8)
LYMPHOCYTES NFR BLD: 11.1 % (ref 22–41)
MCH RBC QN AUTO: 31.1 PG (ref 27–33)
MCHC RBC AUTO-ENTMCNC: 31.7 G/DL (ref 33.6–35)
MCV RBC AUTO: 98 FL (ref 81.4–97.8)
MONOCYTES # BLD AUTO: 0.94 K/UL (ref 0–0.85)
MONOCYTES NFR BLD AUTO: 9.8 % (ref 0–13.4)
NEUTROPHILS # BLD AUTO: 7.39 K/UL (ref 2–7.15)
NEUTROPHILS NFR BLD: 77.2 % (ref 44–72)
NRBC # BLD AUTO: 0 K/UL
NRBC BLD-RTO: 0 /100 WBC
PLATELET # BLD AUTO: 167 K/UL (ref 164–446)
PMV BLD AUTO: 9 FL (ref 9–12.9)
RBC # BLD AUTO: 2.96 M/UL (ref 4.2–5.4)
WBC # BLD AUTO: 9.6 K/UL (ref 4.8–10.8)

## 2019-05-16 PROCEDURE — 85025 COMPLETE CBC W/AUTO DIFF WBC: CPT

## 2019-05-16 PROCEDURE — A9270 NON-COVERED ITEM OR SERVICE: HCPCS | Performed by: HOSPITALIST

## 2019-05-16 PROCEDURE — 770006 HCHG ROOM/CARE - MED/SURG/GYN SEMI*

## 2019-05-16 PROCEDURE — 700102 HCHG RX REV CODE 250 W/ 637 OVERRIDE(OP): Performed by: HOSPITALIST

## 2019-05-16 PROCEDURE — 36415 COLL VENOUS BLD VENIPUNCTURE: CPT

## 2019-05-16 PROCEDURE — A9270 NON-COVERED ITEM OR SERVICE: HCPCS | Performed by: NURSE PRACTITIONER

## 2019-05-16 PROCEDURE — A9270 NON-COVERED ITEM OR SERVICE: HCPCS | Performed by: INTERNAL MEDICINE

## 2019-05-16 PROCEDURE — 700102 HCHG RX REV CODE 250 W/ 637 OVERRIDE(OP): Performed by: INTERNAL MEDICINE

## 2019-05-16 PROCEDURE — 99232 SBSQ HOSP IP/OBS MODERATE 35: CPT | Performed by: FAMILY MEDICINE

## 2019-05-16 PROCEDURE — 700102 HCHG RX REV CODE 250 W/ 637 OVERRIDE(OP): Performed by: NURSE PRACTITIONER

## 2019-05-16 RX ADMIN — HYDROCORTISONE ACETATE 25 MG: 25 SUPPOSITORY RECTAL at 05:02

## 2019-05-16 RX ADMIN — LATANOPROST 1 DROP: 50 SOLUTION OPHTHALMIC at 18:10

## 2019-05-16 RX ADMIN — SIMVASTATIN 20 MG: 20 TABLET, FILM COATED ORAL at 18:09

## 2019-05-16 RX ADMIN — POTASSIUM CHLORIDE 20 MEQ: 1500 TABLET, EXTENDED RELEASE ORAL at 05:01

## 2019-05-16 RX ADMIN — OXYBUTYNIN CHLORIDE 5 MG: 5 TABLET ORAL at 18:09

## 2019-05-16 RX ADMIN — SENNOSIDES, DOCUSATE SODIUM 2 TABLET: 50; 8.6 TABLET, FILM COATED ORAL at 05:01

## 2019-05-16 RX ADMIN — METOPROLOL TARTRATE 25 MG: 25 TABLET ORAL at 18:09

## 2019-05-16 RX ADMIN — OMEPRAZOLE 40 MG: 20 CAPSULE, DELAYED RELEASE ORAL at 05:01

## 2019-05-16 RX ADMIN — SENNOSIDES, DOCUSATE SODIUM 2 TABLET: 50; 8.6 TABLET, FILM COATED ORAL at 18:00

## 2019-05-16 RX ADMIN — METOPROLOL TARTRATE 25 MG: 25 TABLET ORAL at 05:01

## 2019-05-16 RX ADMIN — AMLODIPINE BESYLATE 2.5 MG: 5 TABLET ORAL at 05:02

## 2019-05-16 RX ADMIN — HYDROCORTISONE ACETATE 25 MG: 25 SUPPOSITORY RECTAL at 18:10

## 2019-05-16 RX ADMIN — ACETAMINOPHEN 650 MG: 325 TABLET, FILM COATED ORAL at 01:56

## 2019-05-16 RX ADMIN — OXYBUTYNIN CHLORIDE 5 MG: 5 TABLET ORAL at 05:01

## 2019-05-16 RX ADMIN — LEVOTHYROXINE SODIUM 112 MCG: 112 TABLET ORAL at 05:02

## 2019-05-16 ASSESSMENT — ENCOUNTER SYMPTOMS
TINGLING: 0
TREMORS: 0
SPUTUM PRODUCTION: 0
SPEECH CHANGE: 0
HEADACHES: 0
HALLUCINATIONS: 0
CHILLS: 0
BLOOD IN STOOL: 1
DOUBLE VISION: 0
HEMOPTYSIS: 0
MYALGIAS: 0
ABDOMINAL PAIN: 0
BLURRED VISION: 0
ORTHOPNEA: 0
DIARRHEA: 0
FEVER: 0
NAUSEA: 0
PHOTOPHOBIA: 0
WEIGHT LOSS: 0
COUGH: 0
VOMITING: 0
SENSORY CHANGE: 0
EYE PAIN: 0
BRUISES/BLEEDS EASILY: 0
HEARTBURN: 0
PALPITATIONS: 0
DEPRESSION: 0
NECK PAIN: 0
BACK PAIN: 0
CLAUDICATION: 0

## 2019-05-16 ASSESSMENT — PATIENT HEALTH QUESTIONNAIRE - PHQ9
1. LITTLE INTEREST OR PLEASURE IN DOING THINGS: NOT AT ALL
2. FEELING DOWN, DEPRESSED, IRRITABLE, OR HOPELESS: NOT AT ALL
SUM OF ALL RESPONSES TO PHQ9 QUESTIONS 1 AND 2: 0

## 2019-05-16 ASSESSMENT — LIFESTYLE VARIABLES: SUBSTANCE_ABUSE: 0

## 2019-05-16 NOTE — PROGRESS NOTES
Tachypnea and dyspnea with minimal exertion noted this shift. sp02 on 2L NC 91%, pt desats to 85% with turning/repositioning. Pt is not 02 dependent at home. Lung fields are diminished throughout. Dr Ureña notified, new orders received, will continue to monitor. Continuous pulse oximetry in place.

## 2019-05-16 NOTE — PROGRESS NOTES
Bedside report received, pt care assumed. Pt is a&ox4, very [place], hearing devices not available. Pt educated on plan of care, call bell use. Bed alarm in place for safety. PRN inc care, q2h turn/reposition while in bed. Will continue to monitor.

## 2019-05-16 NOTE — DISCHARGE PLANNING
Bedside nurse and Dr. Miller notified of acceptance to [facility]. Bed available tomorrow or Saturday.

## 2019-05-16 NOTE — PROGRESS NOTES
[facility] Medicine Daily Progress Note    Date of Service  5/15/2019    Chief Complaint  88 y.o. female admitted 5/12/2019 with rectal bleeding and acute blood loss anemia    Hospital Course    This is an 88-year-old female admitted 5/12/2019 with complaints of bright red blood per rectum.  She is extremely hard of hearing and communication is limited precluding a more detailed history.  She was anemic at presentation at 10.2/30.3, MCV of 92 and every 6 hemoglobins have shown drops to 8.8, and 8.5.  Sodium was low at presentation at 129, with low chloride at 89 and elevated glucose at 116.  Remainder of her chemistry profile was benign.  Serum osmolality was 276 suggesting isotonic hyponatremia.  Workup has been ongoing.  Gastroenterology consultation with the patient's outpatient provider occurred in the ER.  Dr. Cunningham has evaluated the patient in the observation unit with plans to take the patient for EGD, colonoscopy as there is questionable reports of maroon/black stools as well.      Interval Problem Update  Patient is A/O x4, but extremely hard of hearing.  Her affect is lethargic and slowed she denies pain with the exception of her right knee during her history, but is markedly tender to the left upper and lower quadrant on exam.  She is a poor and unreliable historian and further details are unclear.  Hyponatremia--stable at 129  Hypokalemia of 3.3  Mag and phosphorus normal  Lipid panel is normal  INR and PTT normal  CT scan of the abdomen and pelvis without evidence of acute intra-abdominal pathology  5/14: Laying in bed comfortably.  Has been n.p.o. since midnight for EGD and colonoscopy scheduled for this morning.  No further hematochezia reported.  No chest pain or shortness of breath.  No acute distress noted.  No new issues overnight per staff.  5/15: Sitting up in bed comfortably.  Had a bowel movement this morning with no melena or blood noted.  H&H continues to improve.  Physical therapy recommended  SNF placement.  Admission status was changed to inpatient.  Patient will need to stay 3 nights per Medicare criteria.  No acute distress noted.  Consultants/Specialty  Gastroenterology    Code Status  Full    Disposition  Pending SNF acceptance.  Will need 3 nights as inpatient per Medicare criteria.    Review of Systems  Review of Systems   Unable to perform ROS: Mental acuity   Constitutional: Positive for malaise/fatigue. Negative for chills, fever and weight loss.   HENT: Negative for ear pain, hearing loss and tinnitus.    Eyes: Negative for blurred vision, double vision and photophobia.   Respiratory: Negative for cough and hemoptysis.    Cardiovascular: Negative for chest pain, palpitations and orthopnea.   Gastrointestinal: Positive for blood in stool. Negative for abdominal pain, heartburn, nausea and vomiting.   Genitourinary: Negative for dysuria and urgency.   Musculoskeletal: Negative for myalgias and neck pain.   Neurological: Negative for tingling, tremors and headaches.   Endo/Heme/Allergies: Does not bruise/bleed easily.   Psychiatric/Behavioral: Negative for depression, substance abuse and suicidal ideas.        Physical Exam  Temp:  [36.3 °C (97.3 °F)-37.2 °C (98.9 °F)] 36.4 °C (97.6 °F)  Pulse:  [77-88] 83  Resp:  [16-19] 16  BP: (117-152)/(57-77) 152/77  SpO2:  [90 %-98 %] 94 %    Physical Exam   Constitutional: She is oriented to person, place, and time. She appears well-developed and well-nourished. No distress.   Frail  Hard of hearing   HENT:   Head: Normocephalic and atraumatic.   Mouth/Throat: No oropharyngeal exudate.   Eyes: Pupils are equal, round, and reactive to light. EOM are normal. No scleral icterus.   Neck: Normal range of motion. Neck supple. No thyromegaly present.   Cardiovascular: Normal rate and regular rhythm.  Exam reveals no gallop and no friction rub.    Pulmonary/Chest: Effort normal and breath sounds normal. No respiratory distress.   Abdominal: Soft. Normal  appearance. She exhibits no distension. There is tenderness in the left upper quadrant and left lower quadrant. There is no rigidity and no rebound.   Musculoskeletal: She exhibits no tenderness or deformity.   Neurological: She is alert and oriented to person, place, and time. No cranial nerve deficit.   Skin: Skin is warm and dry. She is not diaphoretic. There is pallor.   Psychiatric: She has a normal mood and affect. Her speech is normal. She is slowed. Cognition and memory are normal.       Fluids  No intake or output data in the 24 hours ending 05/15/19 1730    Laboratory  Recent Labs      05/14/19   2035  05/15/19   0240  05/15/19   0841   HEMOGLOBIN  8.4*  8.1*  8.8*     Recent Labs      05/13/19   0447  05/15/19   0240   SODIUM  129*  134*   POTASSIUM  3.3*  3.8   CHLORIDE  92*  102   CO2  32  27   GLUCOSE  101*  98   BUN  10  8   CREATININE  0.41*  0.49*   CALCIUM  9.2  8.6             Recent Labs      05/13/19   0447   TRIGLYCERIDE  51   HDL  52   LDL  53       Imaging  CT-ABDOMEN-PELVIS WITH   Final Result      1.  No evidence of bowel obstruction or focal inflammatory change.      2.  Extensive colonic diverticulosis.      3.  Fatty liver.      4.  Bilateral renal cysts.      5.  Extensive atherosclerotic vascular calcification.      6.  Multiple age indeterminate thoracolumbar spine compression fractures.           Assessment/Plan  * Rectal bleed   Assessment & Plan    CT abdomen and pelvis benign  Every 6 hemoglobin  GI consult  EGD and colonoscopy planned for 5/14/2019  Bowel prep for GI  N.p.o. at midnight  5/14: Plan for EGD and colonoscopy this morning.  5/15: EGD showed erosive gastritis and duodenitis without active bleeding.  Colonoscopy showed multiple polyps which were removed and sent for pathology and diverticular disease with no active bleeding as well.  PPI recommended.  Outpatient follow-up with GI.  H&H has been stable.     Hyponatremia   Assessment & Plan    Stable  Baseline cognition  unclear  Stop HCTZ  Serum osmolality 276 which is borderline  Lipid panel ordered 5/13--normal  Albumin and total protein normal  Urine osm pending  TSH normal  Possible SIADH, hypokalemia  Continue to monitor.  Gradually improving.       Acute encephalopathy   Assessment & Plan    Resolved.  At baseline for now.       HLD (hyperlipidemia)   Assessment & Plan    Okay to continue home Zocor.     Hypothyroid- (present on admission)   Assessment & Plan    This is chronic,   TSH euthyroid     Benign essential HTN- (present on admission)   Assessment & Plan    Okay to continue home metoprolol, Norvasc  Monitor     Plan of care discussed with multidisciplinary team during rounds.    VTE prophylaxis: SCDs

## 2019-05-16 NOTE — DISCHARGE PLANNING
Anticipated Discharge Disposition: SNF for skilled therapies then home with support from daughter and grandson.    Action: RN SANDRA spoke with daughter, Sofia Araiza, and received consent for snf placement for therapies. Dtr requested RN CM to ask pt if pt could remember what snf she was in in the past after hip surgery, but pt does not remember. Facilities reviewed with pt and choice obtained. Choice faxed to [facility] at ext. [phone].    Barriers to Discharge: Accepting snf.    Plan: Update team in rounds.

## 2019-05-16 NOTE — PROGRESS NOTES
[facility] Medicine Daily Progress Note    Date of Service  5/16/2019    Chief Complaint  88 y.o. female admitted 5/12/2019 with rectal bleeding and acute blood loss anemia    Hospital Course    This is an 88-year-old female admitted 5/12/2019 with complaints of bright red blood per rectum.  She is extremely hard of hearing and communication is limited precluding a more detailed history.  She was anemic at presentation at 10.2/30.3, MCV of 92 and every 6 hemoglobins have shown drops to 8.8, and 8.5.  Sodium was low at presentation at 129, with low chloride at 89 and elevated glucose at 116.  Remainder of her chemistry profile was benign.  Serum osmolality was 276 suggesting isotonic hyponatremia.  Workup has been ongoing.  Gastroenterology consultation with the patient's outpatient provider occurred in the ER.  Dr. Cunningham has evaluated the patient in the observation unit with plans to take the patient for EGD, colonoscopy as there is questionable reports of maroon/black stools as well.      Interval Problem Update  Patient is A/O x4, but extremely hard of hearing.  Her affect is lethargic and slowed she denies pain with the exception of her right knee during her history, but is markedly tender to the left upper and lower quadrant on exam.  She is a poor and unreliable historian and further details are unclear.  Hyponatremia--stable at 129  Hypokalemia of 3.3  Mag and phosphorus normal  Lipid panel is normal  INR and PTT normal  CT scan of the abdomen and pelvis without evidence of acute intra-abdominal pathology  5/14: Laying in bed comfortably.  Has been n.p.o. since midnight for EGD and colonoscopy scheduled for this morning.  No further hematochezia reported.  No chest pain or shortness of breath.  No acute distress noted.  No new issues overnight per staff.  5/15: Sitting up in bed comfortably.  Had a bowel movement this morning with no melena or blood noted.  H&H continues to improve.  Physical therapy recommended  SNF placement.  Admission status was changed to inpatient.  Patient will need to stay 3 nights per Medicare criteria.  No acute distress noted.  5/16: Resting comfortably in chair.  No further rectal bleed.  Mental status as baseline.  No acute distress noted.  Consultants/Specialty  Gastroenterology    Code Status  Full    Disposition  Pending SNF acceptance.  Will need 3 nights as inpatient per Medicare criteria.  Discharge to SNF tomorrow morning.    Review of Systems  Review of Systems   Unable to perform ROS: Mental acuity   Constitutional: Positive for malaise/fatigue. Negative for chills, fever and weight loss.   HENT: Negative for ear discharge, ear pain, hearing loss and tinnitus.    Eyes: Negative for blurred vision, double vision, photophobia and pain.   Respiratory: Negative for cough, hemoptysis and sputum production.    Cardiovascular: Negative for chest pain, palpitations, orthopnea and claudication.   Gastrointestinal: Positive for blood in stool. Negative for abdominal pain, diarrhea, heartburn, nausea and vomiting.   Genitourinary: Negative for dysuria, frequency and urgency.   Musculoskeletal: Negative for back pain, myalgias and neck pain.   Neurological: Negative for tingling, tremors, sensory change, speech change and headaches.   Endo/Heme/Allergies: Does not bruise/bleed easily.   Psychiatric/Behavioral: Negative for depression, hallucinations, substance abuse and suicidal ideas.        Physical Exam  Temp:  [36.8 °C (98.2 °F)-37.4 °C (99.4 °F)] 36.9 °C (98.5 °F)  Pulse:  [84-94] 84  Resp:  [18-22] 18  BP: (122-156)/(68-77) 122/68  SpO2:  [90 %-94 %] 94 %    Physical Exam   Constitutional: She is oriented to person, place, and time. She appears well-developed and well-nourished. No distress.   Frail  Hard of hearing   HENT:   Head: Normocephalic and atraumatic.   Mouth/Throat: No oropharyngeal exudate.   Eyes: Pupils are equal, round, and reactive to light. EOM are normal. No scleral icterus.    Neck: Normal range of motion. Neck supple. No thyromegaly present.   Cardiovascular: Normal rate and regular rhythm.  Exam reveals no gallop and no friction rub.    Pulmonary/Chest: Effort normal and breath sounds normal. No respiratory distress. She has no wheezes.   Abdominal: Soft. Normal appearance. She exhibits no distension. There is tenderness in the left upper quadrant and left lower quadrant. There is no rigidity and no rebound.   Musculoskeletal: She exhibits no tenderness or deformity.   Neurological: She is alert and oriented to person, place, and time. No cranial nerve deficit.   Skin: Skin is warm and dry. No rash noted. She is not diaphoretic. No erythema. There is pallor.   Psychiatric: She has a normal mood and affect. Her speech is normal. She is slowed. Cognition and memory are normal.       Fluids    Intake/Output Summary (Last 24 hours) at 05/16/19 1631  Last data filed at 05/16/19 1300   Gross per 24 hour   Intake              480 ml   Output                0 ml   Net              480 ml       Laboratory  Recent Labs      05/15/19   0240  05/15/19   0841  05/16/19   0934   WBC   --    --   9.6   RBC   --    --   2.96*   HEMOGLOBIN  8.1*  8.8*  9.2*   HEMATOCRIT   --    --   29.0*   MCV   --    --   98.0*   MCH   --    --   31.1   MCHC   --    --   31.7*   RDW   --    --   50.2*   PLATELETCT   --    --   167   MPV   --    --   9.0     Recent Labs      05/15/19   0240   SODIUM  134*   POTASSIUM  3.8   CHLORIDE  102   CO2  27   GLUCOSE  98   BUN  8   CREATININE  0.49*   CALCIUM  8.6                   Imaging  DX-CHEST-LIMITED (1 VIEW)   Final Result         1.  Pulmonary edema and/or infiltrates.   2.  Cardiomegaly   3.  Atherosclerosis   4.  Hyperexpansion of lungs favors changes of COPD.      CT-ABDOMEN-PELVIS WITH   Final Result      1.  No evidence of bowel obstruction or focal inflammatory change.      2.  Extensive colonic diverticulosis.      3.  Fatty liver.      4.  Bilateral renal  cysts.      5.  Extensive atherosclerotic vascular calcification.      6.  Multiple age indeterminate thoracolumbar spine compression fractures.           Assessment/Plan  * Rectal bleed   Assessment & Plan    CT abdomen and pelvis benign  Every 6 hemoglobin  GI consult  EGD and colonoscopy planned for 5/14/2019  Bowel prep for GI  N.p.o. at midnight  5/14: Plan for EGD and colonoscopy this morning.  5/15: EGD showed erosive gastritis and duodenitis without active bleeding.  Colonoscopy showed multiple polyps which were removed and sent for pathology and diverticular disease with no active bleeding as well.  PPI recommended.  Outpatient follow-up with GI.  H&H has been stable.  5/16: H&H continues to improve.  No further rectal bleeding.     Hyponatremia   Assessment & Plan    Stable  Baseline cognition unclear  Stop HCTZ  Serum osmolality 276 which is borderline  Lipid panel ordered 5/13--normal  Albumin and total protein normal  Urine osm pending  TSH normal  Possible SIADH, hypokalemia  Continue to monitor.  Gradually improving.       Generalized weakness- (present on admission)   Assessment & Plan    PT/OT recommended SNF placement.  Patient will need to spend 3 nights as inpatient to meet Medicare criteria.       Acute encephalopathy   Assessment & Plan    Resolved.  At baseline for now.       HLD (hyperlipidemia)   Assessment & Plan    Okay to continue home Zocor.     Hypothyroid- (present on admission)   Assessment & Plan    This is chronic,   TSH euthyroid     Benign essential HTN- (present on admission)   Assessment & Plan    Okay to continue home metoprolol, Norvasc  Monitor     Plan of care discussed with multidisciplinary team during rounds.    VTE prophylaxis: SCDs

## 2019-05-16 NOTE — PROGRESS NOTES
Gastroenterology ([facility]) Progress Note     Author: Elvis Cunningham   Date & Time Created: 5/15/2019 5:12 PM    Chief Complaint: hematochezia    Interval History:  Hematochezia resolved, doing well, no current abdominal pain. Denied further modifying factors, associated symptoms or timing issues.      Review of Systems:  Review of Systems   Unable to perform ROS: Age   Gastrointestinal: Negative for blood in stool and melena.   All other systems reviewed and are negative.      Physical Exam:  Physical Exam   Constitutional: She is oriented to person, place, and time. She appears well-developed and well-nourished. No distress.   HENT:   Head: Normocephalic and atraumatic.   Mouth/Throat: No oropharyngeal exudate.   Eyes: Pupils are equal, round, and reactive to light. EOM are normal. No scleral icterus.   Neck: Normal range of motion. Neck supple. No JVD present. No tracheal deviation present.   Cardiovascular: Normal rate, regular rhythm and intact distal pulses.    Pulmonary/Chest: Effort normal and breath sounds normal. No stridor.   Abdominal: Soft. Bowel sounds are normal. She exhibits no distension. There is no tenderness. There is no rebound and no guarding.   Musculoskeletal: Normal range of motion. She exhibits no edema or tenderness.   Neurological: She is alert and oriented to person, place, and time. No cranial nerve deficit. Coordination normal.   Skin: Skin is warm and dry. No rash noted. She is not diaphoretic. No erythema.   Psychiatric: She has a normal mood and affect. Her behavior is normal.   Nursing note and vitals reviewed.      Labs:          Recent Labs      05/13/19 0447  05/15/19   0240   SODIUM  129*  134*   POTASSIUM  3.3*  3.8   CHLORIDE  92*  102   CO2  32  27   BUN  10  8   CREATININE  0.41*  0.49*   MAGNESIUM  1.7   --    PHOSPHORUS  2.8   --    CALCIUM  9.2  8.6     Recent Labs      05/13/19   0447  05/15/19   0240   ALTSGPT   --   11   ASTSGOT   --   21   ALKPHOSPHAT   --   44    TBILIRUBIN   --   0.3   GLUCOSE  101*  98     Recent Labs      05/14/19   2035  05/15/19   0240  05/15/19   0841   HEMOGLOBIN  8.4*  8.1*  8.8*     Recent Labs      05/15/19   024   ASTSGOT  21   ALTSGPT  11   ALKPHOSPHAT  44   TBILIRUBIN  0.3     Hemodynamics:  Temp (24hrs), Av.6 °C (97.9 °F), Min:36.3 °C (97.3 °F), Max:37.2 °C (98.9 °F)  Temperature: 36.4 °C (97.6 °F)  Pulse  Av.3  Min: 70  Max: 96   Blood Pressure : 152/77     Respiratory:    Respiration: 16, Pulse Oximetry: 94 %        RUL Breath Sounds: Diminished, RML Breath Sounds: Diminished, RLL Breath Sounds: Diminished, ZAIN Breath Sounds: Clear, LLL Breath Sounds: Diminished  Fluids:  No intake or output data in the 24 hours ending 05/15/19 1712  Weight: 88.1 kg (194 lb 3.6 oz)  GI/Nutrition:  Orders Placed This Encounter   Procedures   • Diet Order Cardiac     Standing Status:   Standing     Number of Occurrences:   1     Order Specific Question:   Diet:     Answer:   Cardiac [6]     Order Specific Question:   Texture/Fiber modifications:     Answer:   Dysphagia 3(Mechanical Soft)specify fluid consistency(question 6) [3]     Medical Decision Making, by Problems:  Active Hospital Problems    Diagnosis   • *Rectal bleed [K62.5]   • Hyponatremia [E87.1]   • Acute encephalopathy [G93.40]   • HLD (hyperlipidemia) [E78.5]   • Benign essential HTN [I10]   • Hypothyroid [E03.9]       Problems:  1. Ascending colon polyps, snared, likely etiology of prior maroon hematochezia, resolved; Multiple colon polyps also snared and removed.  2. Erosive gastroduodenitis  3. Colonic diverticulosis without bleeding  4. Internal hemorrhoids, grade 1  5. Anemia from acute blood loss, stable  6. Hypothyroidism  7. Postmenopausal status  8. Chronic back pain  9. Hypertension  10.  Hypercholesterolemia  11.  Impaired hearing  12.  BMI of 32.6  13.  Bilateral renal cysts,  14.  Aortic atherosclerosis, vascular calcifications  15.  Thoracolumbar spine compression  fractures  16.  Left bundle-branch block.    Path reviewed as follows, no change in recommendations.  A. Gastric biopsy:  Benign gastric mucosal biopsies demonstrating minimal chronic  inflammation with no H. pylori organisms identified on  Warthin-Starry special stain.  B. Duodenal biopsy:  Small bowel mucosa demonstrating the usual villous architecture  and no increase in intra-epithelial lymphocytes are noted. No  histologic features suggestive of sprue identified. No  malignancy identified.  C. Ascending colon polyps:  Fragments of tubular adenoma with no high grade dysplasia noted.  D. Cecum polyp:  Tubular adenoma with no high grade dysplasia noted.  E. Descending colon polyp:  Tubular adenoma with no high grade dysplasia noted.        Recommendations:   1.  Avoid NSAIDs, fish-oil and blood-thinners for 8 weeks.  2.  Prilosec 40mg PO QD x 21 days.  3.  High-fiber cardiac diet.  4.  Anusol-HC suppositories BID x 14 days.  5.  No recall colonoscopy needed due to age.  6.  [facility] signed off.  7.  Follow-up as needed with established GI Dr. Samuel Mejia.    Quality-Core Measures   Reviewed items::  Labs reviewed and Medications reviewed

## 2019-05-16 NOTE — DISCHARGE PLANNING
[facility]ency[facility] Name: [facility]  Outcome: Left message about transport, awaiting call back.    @[id number]  Agency/Fac[facility] Name: [facility]  Spoke To: Briana  Outcome: Transport set up for tomorrow at 1100.

## 2019-05-16 NOTE — ASSESSMENT & PLAN NOTE
PT/OT recommended SNF placement.  Patient will need to spend 3 nights as inpatient to meet Medicare criteria.

## 2019-05-16 NOTE — DISCHARGE PLANNING
Received Choice form at 1005  [facility] Name: [facility]  Referral sent per Choice form @ 1005     @1040  [facility] Name: [facility]  Spoke To: Briana  Outcome: Discharge plan?    @1110  [facility] Name[facility]  Spoke To: Leora  Outcome: Accepted.    @1130  [facility]  Spoke To: Josephine  Outcome: Accepted bed available tomorrow.

## 2019-05-16 NOTE — PROGRESS NOTES
Gave RN hand-off report to CARRINGTON Muhammad. Patient had all of her belongings. Transferred to [place] [address] bed 2.

## 2019-05-16 NOTE — DISCHARGE PLANNING
Anticipated Discharge Disposition: [facility] tomorrow at 1100.    Action: RN CM met with pt and daughter and informed both of transport time. Questions answered.    Barriers to Discharge: None.    Plan: Assist with any needs that may be identified.

## 2019-05-17 VITALS
HEIGHT: 64 IN | RESPIRATION RATE: 17 BRPM | TEMPERATURE: 97.3 F | DIASTOLIC BLOOD PRESSURE: 65 MMHG | OXYGEN SATURATION: 93 % | WEIGHT: 194.22 LBS | HEART RATE: 84 BPM | SYSTOLIC BLOOD PRESSURE: 134 MMHG | BODY MASS INDEX: 33.16 KG/M2

## 2019-05-17 PROCEDURE — A9270 NON-COVERED ITEM OR SERVICE: HCPCS | Performed by: HOSPITALIST

## 2019-05-17 PROCEDURE — 99239 HOSP IP/OBS DSCHRG MGMT >30: CPT | Performed by: INTERNAL MEDICINE

## 2019-05-17 PROCEDURE — A9270 NON-COVERED ITEM OR SERVICE: HCPCS | Performed by: INTERNAL MEDICINE

## 2019-05-17 PROCEDURE — 700102 HCHG RX REV CODE 250 W/ 637 OVERRIDE(OP): Performed by: INTERNAL MEDICINE

## 2019-05-17 PROCEDURE — 700102 HCHG RX REV CODE 250 W/ 637 OVERRIDE(OP): Performed by: NURSE PRACTITIONER

## 2019-05-17 PROCEDURE — 700102 HCHG RX REV CODE 250 W/ 637 OVERRIDE(OP): Performed by: HOSPITALIST

## 2019-05-17 PROCEDURE — A9270 NON-COVERED ITEM OR SERVICE: HCPCS | Performed by: NURSE PRACTITIONER

## 2019-05-17 RX ORDER — OMEPRAZOLE 40 MG/1
40 CAPSULE, DELAYED RELEASE ORAL DAILY
Qty: 30 CAP
Start: 2019-05-18 | End: 2019-09-19

## 2019-05-17 RX ORDER — LEVOTHYROXINE SODIUM 112 UG/1
112 TABLET ORAL
Qty: 30 TAB
Start: 2019-05-18 | End: 2019-09-19

## 2019-05-17 RX ORDER — SIMVASTATIN 20 MG
20 TABLET ORAL EVERY EVENING
Qty: 30 TAB | Refills: 11
Start: 2019-05-17 | End: 2019-09-19

## 2019-05-17 RX ORDER — AMLODIPINE BESYLATE 2.5 MG/1
2.5 TABLET ORAL DAILY
Qty: 30 TAB
Start: 2019-05-18 | End: 2019-09-19

## 2019-05-17 RX ORDER — HYDROCORTISONE ACETATE 25 MG/1
25 SUPPOSITORY RECTAL EVERY 12 HOURS
Refills: 0
Start: 2019-05-17 | End: 2019-05-29

## 2019-05-17 RX ORDER — OXYBUTYNIN CHLORIDE 5 MG/1
5 TABLET ORAL 2 TIMES DAILY
Qty: 90 TAB
Start: 2019-05-17 | End: 2019-09-19

## 2019-05-17 RX ORDER — AMOXICILLIN 250 MG
2 CAPSULE ORAL 2 TIMES DAILY
Qty: 30 TAB | Refills: 0
Start: 2019-05-17 | End: 2019-09-19

## 2019-05-17 RX ORDER — ACETAMINOPHEN 325 MG/1
650 TABLET ORAL EVERY 6 HOURS PRN
Qty: 30 TAB | Refills: 0
Start: 2019-05-17 | End: 2019-09-19

## 2019-05-17 RX ADMIN — HYDROCORTISONE ACETATE 25 MG: 25 SUPPOSITORY RECTAL at 05:15

## 2019-05-17 RX ADMIN — POTASSIUM CHLORIDE 20 MEQ: 1500 TABLET, EXTENDED RELEASE ORAL at 05:13

## 2019-05-17 RX ADMIN — LEVOTHYROXINE SODIUM 112 MCG: 112 TABLET ORAL at 05:15

## 2019-05-17 RX ADMIN — METOPROLOL TARTRATE 25 MG: 25 TABLET ORAL at 05:13

## 2019-05-17 RX ADMIN — OMEPRAZOLE 40 MG: 20 CAPSULE, DELAYED RELEASE ORAL at 05:13

## 2019-05-17 RX ADMIN — AMLODIPINE BESYLATE 2.5 MG: 5 TABLET ORAL at 05:13

## 2019-05-17 RX ADMIN — SENNOSIDES, DOCUSATE SODIUM 2 TABLET: 50; 8.6 TABLET, FILM COATED ORAL at 05:13

## 2019-05-17 RX ADMIN — OXYBUTYNIN CHLORIDE 5 MG: 5 TABLET ORAL at 05:13

## 2019-05-17 NOTE — PROGRESS NOTES
Pt rested well getting up to restroom with standby help and front wheel walker. No active bleeding noted. Bed lowered, locked and call light within reach.

## 2019-05-17 NOTE — DISCHARGE SUMMARY
Discharge Summary    CHIEF COMPLAINT ON ADMISSION  Chief Complaint   Patient presents with   • Rectal Bleeding       Reason for Admission  ABD PAIN     CODE STATUS  Full Code    HPI & HOSPITAL COURSE  This is an 88-year-old female admitted 5/12/2019 with complaints of bright red blood per rectum.  She is extremely hard of hearing and communication is limited precluding a more detailed history.  She was anemic at presentation at 10.2/30.3, MCV of 92 and every 6 hemoglobins have shown drops to 8.8, and 8.5.  Sodium was low at presentation at 129, with low chloride at 89 and elevated glucose at 116.  Remainder of her chemistry profile was benign.  Serum osmolality was 276 suggesting isotonic hyponatremia.  Workup has been ongoing.  Gastroenterology consultation with the patient's outpatient provider occurred in the ER.  Dr. Cunningham has evaluated the patient in the observation unit with plans to take the patient for EGD, colonoscopy as there is questionable reports of maroon/black stools as well.    Patient underwent EGD and colonoscopy and the result is listed as below.:  1. Ascending colon polyps likely etiology of prior maroon hematochezia.  2. Multiple colon polyps, snared and removed.  3. Erosive gastroduodenitis  4. Colonic diverticulosis without bleeding  5. Internal hemorrhoids, grade 1  Patient's hemoglobin remained stable and patient's bleeding stopped.  Patient was treated with PPI and tolerated diet.  PT OT evaluated patient and recommend patient go to nursing care facility for rehabilitation.  Patient currently medically stable and ready to be transferred.  * Rectal bleed- (present on admission)   Assessment & Plan    CT abdomen and pelvis benign  Every 6 hemoglobin  GI consult  EGD and colonoscopy planned for 5/14/2019  Bowel prep for GI  N.p.o. at midnight  5/14: Plan for EGD and colonoscopy this morning.  5/15: EGD showed erosive gastritis and duodenitis without active bleeding.  Colonoscopy showed multiple  polyps which were removed and sent for pathology and diverticular disease with no active bleeding as well.  PPI recommended.  Outpatient follow-up with GI.  H&H has been stable.  5/16: H&H continues to improve.  No further rectal bleeding.     Hyponatremia- (present on admission)   Assessment & Plan    Stable  Baseline cognition unclear  Stop HCTZ  Serum osmolality 276 which is borderline  Lipid panel ordered 5/13--normal  Albumin and total protein normal  Urine osm pending  TSH normal  Possible SIADH, hypokalemia  Continue to monitor.  Gradually improving.       Generalized weakness- (present on admission)   Assessment & Plan    PT/OT recommended SNF placement.  Patient will need to spend 3 nights as inpatient to meet Medicare criteria.       Acute encephalopathy- (present on admission)   Assessment & Plan    Resolved.  At baseline for now.       HLD (hyperlipidemia)- (present on admission)   Assessment & Plan    Okay to continue home Zocor.     Hypothyroid- (present on admission)   Assessment & Plan    This is chronic,   TSH euthyroid     Benign essential HTN- (present on admission)   Assessment & Plan    Okay to continue home metoprolol, Norvasc  Monitor            Therefore, she is discharged in fair and stable condition to skilled nursing facility.    The patient met 2-midnight criteria for an inpatient stay at the time of discharge.      FOLLOW UP ITEMS POST DISCHARGE  none    DISCHARGE DIAGNOSES      Rectal bleed POA: Yes    Hyponatremia POA: Yes    Benign essential HTN POA: Yes    Hypothyroid POA: Yes    HLD (hyperlipidemia) POA: Yes    Acute encephalopathy POA: Yes    Generalized weakness POA: Yes    FOLLOW UP  No future appointments.  [facility]  [address] [facility] [facility] [address] [address]  [phone]          MEDICATIONS ON DISCHARGE     Medication List      START taking these medications      Instructions   acetaminophen 325 MG Tabs  Commonly known as:  TYLENOL   Take 2 Tabs by mouth  every 6 hours as needed (Mild Pain; (Pain scale 1-3); Temp greater than 100.5 F).  Dose:  650 mg     hydrocortisone 25 MG Supp  Commonly known as:  ANUSOL-HC   Insert 1 Suppository in rectum every 12 hours for 12 days.  Dose:  25 mg     omeprazole 40 MG delayed-release capsule  Start taking on:  5/18/2019  Commonly known as:  PRILOSEC   Take 1 Cap by mouth every day.  Dose:  40 mg     senna-docusate 8.6-50 MG Tabs  Commonly known as:  PERICOLACE or SENOKOT S   Take 2 Tabs by mouth 2 Times a Day.  Dose:  2 Tab        CHANGE how you take these medications      Instructions   metoprolol 25 MG Tabs  What changed:  when to take this  Commonly known as:  LOPRESSOR   Take 1 Tab by mouth 2 Times a Day.  Dose:  25 mg        CONTINUE taking these medications      Instructions   amLODIPine 2.5 MG Tabs  Start taking on:  5/18/2019  Commonly known as:  NORVASC   Take 1 Tab by mouth every day.  Dose:  2.5 mg     CALCIUM 600 + D PO   Take 1 Tab by mouth every day. Indications: **OTC**  Dose:  1 Tab     cetirizine 10 MG Tabs  Commonly known as:  ZYRTEC   Take 10 mg by mouth every morning.  Dose:  10 mg     levothyroxine 112 MCG Tabs  Start taking on:  5/18/2019  Commonly known as:  SYNTHROID   Take 1 Tab by mouth every morning before breakfast.  Dose:  112 mcg     oxybutynin 5 MG Tabs  Commonly known as:  DITROPAN   Take 1 Tab by mouth 2 Times a Day.  Dose:  5 mg     PRESERVISION AREDS PO   Take 1 Tab by mouth 2 Times a Day.  Dose:  1 Tab     simvastatin 20 MG Tabs  Commonly known as:  ZOCOR   Take 1 Tab by mouth every evening.  Dose:  20 mg     SUPER B COMPLEX PO   Take 1 Tab by mouth every day. Indications: **OTC**  Dose:  1 Tab     travoprost 0.004 % Soln  Commonly known as:  TRAVATAN Z   Place 1 Drop in both eyes every evening.  Dose:  1 Drop        STOP taking these medications    hydroCHLOROthiazide 25 MG Tabs  Commonly known as:  HYDRODIURIL            Allergies  Allergies   Allergen Reactions   • Codeine      Upset stomach.    • Neosporin [Neomycin-Bacitracin-Polymyxin] Rash       DIET  Orders Placed This Encounter   Procedures   • Diet Order Cardiac     Standing Status:   Standing     Number of Occurrences:   1     Order Specific Question:   Diet:     Answer:   Cardiac [6]     Order Specific Question:   Texture/Fiber modifications:     Answer:   Dysphagia 3(Mechanical Soft)specify fluid consistency(question 6) [3]       ACTIVITY  As tolerated and directed by skilled nursing.  Weight bearing as tolerated    LINES, DRAINS, AND WOUNDS  This is an automated list. Peripheral IVs will be removed prior to discharge.  Peripheral IV 05/12/19 18 G Left Antecubital (Active)   Site Assessment Dry;Clean;Intact 5/16/2019  8:00 PM   Dressing Type Transparent 5/16/2019  8:00 PM   Line Status Saline locked 5/16/2019  8:00 PM   Dressing Status Clean;Dry;Intact 5/16/2019  8:00 PM   Dressing Intervention N/A 5/16/2019  8:00 PM   Date Primary Tubing Changed 05/14/19 5/14/2019  8:00 PM   NEXT Primary Tubing Change  05/18/19 5/14/2019  8:00 PM   Infiltration Grading ([facility], [facility]) 0 5/16/2019  8:00 PM   Phlebitis Scale ([facility] Only) 0 5/16/2019  8:00 PM          Peripheral IV 05/12/19 18 G Left Antecubital (Active)   Site Assessment Dry;Clean;Intact 5/16/2019  8:00 PM   Dressing Type Transparent 5/16/2019  8:00 PM   Line Status Saline locked 5/16/2019  8:00 PM   Dressing Status Clean;Dry;Intact 5/16/2019  8:00 PM   Dressing Intervention N/A 5/16/2019  8:00 PM   Date Primary Tubing Changed 05/14/19 5/14/2019  8:00 PM   NEXT Primary Tubing Change  05/18/19 5/14/2019  8:00 PM   Infiltration Grading ([facility], [facility]) 0 5/16/2019  8:00 PM   Phlebitis Scale ([facility] Only) 0 5/16/2019  8:00 PM               MENTAL STATUS ON TRANSFER  Level of Consciousness: Alert  Orientation : Oriented x 4  Speech: Speech Clear    CONSULTATIONS  GI    PROCEDURES  EGD and colonoscopy    1. Ascending colon polyps likely etiology of prior maroon hematochezia.  2. Multiple colon polyps,  snared and removed.  3. Erosive gastroduodenitis  4. Colonic diverticulosis without bleeding  5. Internal hemorrhoids, grade 1    DX-CHEST-LIMITED (1 VIEW)   Final Result         1.  Pulmonary edema and/or infiltrates.   2.  Cardiomegaly   3.  Atherosclerosis   4.  Hyperexpansion of lungs favors changes of COPD.      CT-ABDOMEN-PELVIS WITH   Final Result      1.  No evidence of bowel obstruction or focal inflammatory change.      2.  Extensive colonic diverticulosis.      3.  Fatty liver.      4.  Bilateral renal cysts.      5.  Extensive atherosclerotic vascular calcification.      6.  Multiple age indeterminate thoracolumbar spine compression fractures.          PE:  Gen: AAOx3, NAD  Eyes: PELLA  Neck: no JVD, no lymphadenopathy  Cardia: RRR, no mrg  Lungs: CTAB, no rales, rhonci or wheezing  Abd: NABS, soft, non extended, no mass  EXT: No C/C/E, peripheral pulse 2+ b/l  Neuro: CN II-XII intact, non focal, reflex 2+ symmetrical  Skin: Intact, no lesion, warm  Psych: Appropriate.      LABORATORY  Lab Results   Component Value Date    SODIUM 134 (L) 05/15/2019    POTASSIUM 3.8 05/15/2019    CHLORIDE 102 05/15/2019    CO2 27 05/15/2019    GLUCOSE 98 05/15/2019    BUN 8 05/15/2019    CREATININE 0.49 (L) 05/15/2019        Lab Results   Component Value Date    WBC 9.6 05/16/2019    HEMOGLOBIN 9.2 (L) 05/16/2019    HEMATOCRIT 29.0 (L) 05/16/2019    PLATELETCT 167 05/16/2019        Total time of the discharge process exceeds 38 minutes.

## 2019-05-17 NOTE — PROGRESS NOTES
Patient discharged via [facility] to [place]. Discharge education provided to patient. Pt understanding of education provided. PIV removed, dressing applied. All belongings accounted for. No complaints of pain at time of discharge.

## 2019-05-17 NOTE — CARE PLAN
Problem: Safety  Goal: Will remain free from injury  Outcome: PROGRESSING AS EXPECTED  Pt calls appropriately for assistance and needs.    Problem: Knowledge Deficit  Goal: Knowledge of disease process/condition, treatment plan, diagnostic tests, and medications will improve  Outcome: PROGRESSING AS EXPECTED  Pt a&o x4 and able to verbalize understanding of plan of care.

## 2019-05-17 NOTE — DISCHARGE PLANNING
Agency/Facility Name: [facility]  Spoke To: Briana  Outcome: [facility] pushed transport back to 1200.

## 2019-05-17 NOTE — DISCHARGE INSTRUCTIONS
Discharge Instructions    Discharged to other by medical transportation with escort. Discharged via wheelchair, hospital escort: Yes.  Special equipment needed: Not Applicable    Be sure to schedule a follow-up appointment with your primary care doctor or any specialists as instructed.     Discharge Plan:   Influenza Vaccine Indication: Not indicated: Previously immunized this influenza season and > 8 years of age    I understand that a diet low in cholesterol, fat, and sodium is recommended for good health. Unless I have been given specific instructions below for another diet, I accept this instruction as my diet prescription.   Other diet: Dysphagia 3, Cardiac diet    Special Instructions: None    · Is patient discharged on Warfarin / Coumadin?   No     Depression / Suicide Risk    As you are discharged from this [facility] facility, it is important to learn how to keep safe from harming yourself.    Recognize the warning signs:  · Abrupt changes in personality, positive or negative- including increase in energy   · Giving away possessions  · Change in eating patterns- significant weight changes-  positive or negative  · Change in sleeping patterns- unable to sleep or sleeping all the time   · Unwillingness or inability to communicate  · Depression  · Unusual sadness, discouragement and loneliness  · Talk of wanting to die  · Neglect of personal appearance   · Rebelliousness- reckless behavior  · Withdrawal from people/activities they love  · Confusion- inability to concentrate     If you or a loved one observes any of these behaviors or has concerns about self-harm, here's what you can do:  · Talk about it- your feelings and reasons for harming yourself  · Remove any means that you might use to hurt yourself (examples: pills, rope, extension cords, firearm)  · Get professional help from the community (Mental Health, Substance Abuse, psychological counseling)  · Do not be alone:Call your Safe Contact- someone  whom you trust who will be there for you.  · Call your local CRISIS HOTLINE 902-5618 or 071-821-3022  · Call your local Children's Mobile Crisis Response Team [place] (184) 053-7573 or www.[employer]  · Call the toll free National Suicide Prevention Hotlines   · National Suicide Prevention Lifeline 097-765-GXFD (6321)  · [employer] Hope Line Network 800-SUICIDE (050-3281)    Rectal Bleeding  Rectal bleeding is when blood comes out of the opening of the butt (anus). People with this kind of bleeding may notice bright red blood in their underwear or in the toilet after they poop (have a bowel movement). They may also have dark red or black poop (stool). Rectal bleeding is often a sign that something is wrong. It needs to be checked by a doctor.  Follow these instructions at home:  Watch for any changes in your condition. Take these actions to help with bleeding and discomfort:  · Eat a diet that is high in fiber. This will keep your poop soft so it is easier for you to poop without pushing too hard. Ask your doctor to tell you what foods and drinks are high in fiber.  · Drink enough fluid to keep your pee (urine) clear or pale yellow. This also helps keep your poop soft.  · Try taking a warm bath. This may help with pain.  · Keep all follow-up visits as told by your doctor. This is important.  Get help right away if:  · You have new bleeding.  · You have more bleeding than before.  · You have black or dark red poop.  · You throw up (vomit) blood or something that looks like coffee grounds.  · You have pain or tenderness in your belly (abdomen).  · You have a fever.  · You feel weak.  · You feel sick to your stomach (nauseous).  · You pass out (faint).  · You have very bad pain in your butt.  · You cannot poop.  This information is not intended to replace advice given to you by your health care provider. Make sure you discuss any questions you have with your health care provider.  Document Released:  08/29/2012 Document Revised: 05/25/2017 Document Reviewed: 02/12/2017  Elsevier Interactive Patient Education © 2017 [employer]

## 2019-05-17 NOTE — CARE PLAN
Problem: Safety  Goal: Will remain free from injury  Outcome: PROGRESSING AS EXPECTED  Bed locked and in lowest position. Call light and personal belongings in reach. Bed alarm in place. Hourly rounding.     Problem: Skin Integrity  Goal: Risk for impaired skin integrity will decrease  Outcome: PROGRESSING AS EXPECTED  Q2 turns. Pillows in use for support and positioning. Barrier wipes in use.

## 2019-05-17 NOTE — DISCHARGE PLANNING
Pt new to floor. LSW received COBRA from HEIDE Hwang. COBRA placed in chart. Pt set to dc to [facility] @ 1100.     Care Transition Team Assessment    Information Source  Orientation : Oriented x 4  Information Given By: Patient    Elopement Risk  Legal Hold: No  Ambulatory or Self Mobile in Wheelchair: Yes  Disoriented: No  Psychiatric Symptoms: None  History of Wandering: No  Elopement this Admit: No  Vocalizing Wanting to Leave: No  Displays Behaviors, Body Language Wanting to Leave: No-Not at Risk for Elopement  Elopement Risk: Not at Risk for Elopement    Interdisciplinary Discharge Planning  Does Admitting Nurse Feel This Could be a Complex Discharge?: No  Primary Care Physician: Kain Owen  Lives with - Patient's Self Care Capacity: Alone and Able to Care For Self  Patient or legal guardian wants to designate a caregiver (see row info): No  Support Systems: Family Member(s)  Housing / Facility: Mobile Home  Do You Take your Prescribed Medications Regularly: Yes  Able to Return to Previous ADL's: Yes  Mobility Issues: No  Prior Services: None  Patient Expects to be Discharged to:: home  Assistance Needed: No  Durable Medical Equipment: Home Oxygen, Walker (cane)    Discharge Preparedness  What is your plan after discharge?: Skilled nursing facility  What are your discharge supports?: Child              Vision / Hearing Impairment  Vision Impairment : Yes  Right Eye Vision: Impaired, Wears Glasses  Left Eye Vision: Impaired, Wears Glasses  Hearing Impairment : Yes  Hearing Impairment: Both Ears, Hearing Device(s) Available              Domestic Abuse  Have you ever been the victim of abuse or violence?: No    Psychological Assessment  History of Substance Abuse: None  History of Psychiatric Problems: No  Non-compliant with Treatment: No  Newly Diagnosed Illness: Yes         Anticipated Discharge Information  Anticipated discharge disposition: SNF

## 2019-09-19 ENCOUNTER — APPOINTMENT (OUTPATIENT)
Dept: RADIOLOGY | Facility: MEDICAL CENTER | Age: 84
DRG: 378 | End: 2019-09-19
Attending: EMERGENCY MEDICINE
Payer: MEDICARE

## 2019-09-19 ENCOUNTER — HOSPITAL ENCOUNTER (INPATIENT)
Facility: MEDICAL CENTER | Age: 84
LOS: 14 days | DRG: 378 | End: 2019-10-03
Attending: EMERGENCY MEDICINE | Admitting: FAMILY MEDICINE
Payer: MEDICARE

## 2019-09-19 DIAGNOSIS — R41.89 COGNITIVE IMPAIRMENT: ICD-10-CM

## 2019-09-19 DIAGNOSIS — R10.32 LEFT LOWER QUADRANT PAIN: ICD-10-CM

## 2019-09-19 DIAGNOSIS — K62.5 BRBPR (BRIGHT RED BLOOD PER RECTUM): ICD-10-CM

## 2019-09-19 DIAGNOSIS — R53.81 DEBILITY: ICD-10-CM

## 2019-09-19 LAB
ABO GROUP BLD: NORMAL
ALBUMIN SERPL BCP-MCNC: 3.8 G/DL (ref 3.2–4.9)
ALBUMIN/GLOB SERPL: 1.2 G/DL
ALP SERPL-CCNC: 75 U/L (ref 30–99)
ALT SERPL-CCNC: 9 U/L (ref 2–50)
ANION GAP SERPL CALC-SCNC: 9 MMOL/L (ref 0–11.9)
APTT PPP: 25.3 SEC (ref 24.7–36)
AST SERPL-CCNC: 21 U/L (ref 12–45)
BASOPHILS # BLD AUTO: 0.4 % (ref 0–1.8)
BASOPHILS # BLD: 0.03 K/UL (ref 0–0.12)
BILIRUB SERPL-MCNC: 0.5 MG/DL (ref 0.1–1.5)
BLD GP AB SCN SERPL QL: NORMAL
BUN SERPL-MCNC: 12 MG/DL (ref 8–22)
CALCIUM SERPL-MCNC: 10 MG/DL (ref 8.5–10.5)
CHLORIDE SERPL-SCNC: 102 MMOL/L (ref 96–112)
CO2 SERPL-SCNC: 30 MMOL/L (ref 20–33)
CREAT SERPL-MCNC: 0.57 MG/DL (ref 0.5–1.4)
EOSINOPHIL # BLD AUTO: 0.13 K/UL (ref 0–0.51)
EOSINOPHIL NFR BLD: 1.7 % (ref 0–6.9)
ERYTHROCYTE [DISTWIDTH] IN BLOOD BY AUTOMATED COUNT: 56.3 FL (ref 35.9–50)
GLOBULIN SER CALC-MCNC: 3.3 G/DL (ref 1.9–3.5)
GLUCOSE SERPL-MCNC: 108 MG/DL (ref 65–99)
HCT VFR BLD AUTO: 38.9 % (ref 37–47)
HGB BLD-MCNC: 10.4 G/DL (ref 12–16)
HGB BLD-MCNC: 11.9 G/DL (ref 12–16)
IMM GRANULOCYTES # BLD AUTO: 0.01 K/UL (ref 0–0.11)
IMM GRANULOCYTES NFR BLD AUTO: 0.1 % (ref 0–0.9)
INR PPP: 0.92 (ref 0.87–1.13)
LACTATE BLD-SCNC: 1.4 MMOL/L (ref 0.5–2)
LYMPHOCYTES # BLD AUTO: 1.83 K/UL (ref 1–4.8)
LYMPHOCYTES NFR BLD: 23.6 % (ref 22–41)
MCH RBC QN AUTO: 27.7 PG (ref 27–33)
MCHC RBC AUTO-ENTMCNC: 30.6 G/DL (ref 33.6–35)
MCV RBC AUTO: 90.7 FL (ref 81.4–97.8)
MONOCYTES # BLD AUTO: 0.75 K/UL (ref 0–0.85)
MONOCYTES NFR BLD AUTO: 9.7 % (ref 0–13.4)
NEUTROPHILS # BLD AUTO: 5.01 K/UL (ref 2–7.15)
NEUTROPHILS NFR BLD: 64.5 % (ref 44–72)
NRBC # BLD AUTO: 0 K/UL
NRBC BLD-RTO: 0 /100 WBC
PLATELET # BLD AUTO: 212 K/UL (ref 164–446)
PMV BLD AUTO: 9.7 FL (ref 9–12.9)
POTASSIUM SERPL-SCNC: 4.4 MMOL/L (ref 3.6–5.5)
PROT SERPL-MCNC: 7.1 G/DL (ref 6–8.2)
PROTHROMBIN TIME: 12.6 SEC (ref 12–14.6)
RBC # BLD AUTO: 4.29 M/UL (ref 4.2–5.4)
RH BLD: NORMAL
SODIUM SERPL-SCNC: 141 MMOL/L (ref 135–145)
WBC # BLD AUTO: 7.8 K/UL (ref 4.8–10.8)

## 2019-09-19 PROCEDURE — 85025 COMPLETE CBC W/AUTO DIFF WBC: CPT

## 2019-09-19 PROCEDURE — 96374 THER/PROPH/DIAG INJ IV PUSH: CPT

## 2019-09-19 PROCEDURE — 93005 ELECTROCARDIOGRAM TRACING: CPT | Performed by: EMERGENCY MEDICINE

## 2019-09-19 PROCEDURE — 71045 X-RAY EXAM CHEST 1 VIEW: CPT

## 2019-09-19 PROCEDURE — 86850 RBC ANTIBODY SCREEN: CPT

## 2019-09-19 PROCEDURE — 80053 COMPREHEN METABOLIC PANEL: CPT

## 2019-09-19 PROCEDURE — 700105 HCHG RX REV CODE 258: Performed by: FAMILY MEDICINE

## 2019-09-19 PROCEDURE — 99222 1ST HOSP IP/OBS MODERATE 55: CPT | Mod: AI | Performed by: FAMILY MEDICINE

## 2019-09-19 PROCEDURE — 700102 HCHG RX REV CODE 250 W/ 637 OVERRIDE(OP): Performed by: FAMILY MEDICINE

## 2019-09-19 PROCEDURE — 99285 EMERGENCY DEPT VISIT HI MDM: CPT

## 2019-09-19 PROCEDURE — 74177 CT ABD & PELVIS W/CONTRAST: CPT

## 2019-09-19 PROCEDURE — 83605 ASSAY OF LACTIC ACID: CPT

## 2019-09-19 PROCEDURE — A9270 NON-COVERED ITEM OR SERVICE: HCPCS | Performed by: FAMILY MEDICINE

## 2019-09-19 PROCEDURE — 85610 PROTHROMBIN TIME: CPT

## 2019-09-19 PROCEDURE — 85730 THROMBOPLASTIN TIME PARTIAL: CPT

## 2019-09-19 PROCEDURE — 36415 COLL VENOUS BLD VENIPUNCTURE: CPT

## 2019-09-19 PROCEDURE — 86901 BLOOD TYPING SEROLOGIC RH(D): CPT

## 2019-09-19 PROCEDURE — 700111 HCHG RX REV CODE 636 W/ 250 OVERRIDE (IP): Performed by: EMERGENCY MEDICINE

## 2019-09-19 PROCEDURE — 770006 HCHG ROOM/CARE - MED/SURG/GYN SEMI*

## 2019-09-19 PROCEDURE — 85018 HEMOGLOBIN: CPT

## 2019-09-19 PROCEDURE — 86900 BLOOD TYPING SEROLOGIC ABO: CPT

## 2019-09-19 PROCEDURE — 700117 HCHG RX CONTRAST REV CODE 255: Performed by: EMERGENCY MEDICINE

## 2019-09-19 RX ORDER — GAUZE BANDAGE 2" X 2"
1 BANDAGE TOPICAL EVERY MORNING
COMMUNITY

## 2019-09-19 RX ORDER — SODIUM CHLORIDE 9 MG/ML
INJECTION, SOLUTION INTRAVENOUS CONTINUOUS
Status: DISCONTINUED | OUTPATIENT
Start: 2019-09-19 | End: 2019-09-21

## 2019-09-19 RX ORDER — ONDANSETRON 2 MG/ML
4 INJECTION INTRAMUSCULAR; INTRAVENOUS EVERY 4 HOURS PRN
Status: DISCONTINUED | OUTPATIENT
Start: 2019-09-19 | End: 2019-09-19

## 2019-09-19 RX ORDER — LEVOTHYROXINE SODIUM 112 UG/1
112 TABLET ORAL
COMMUNITY

## 2019-09-19 RX ORDER — ACETAMINOPHEN 325 MG/1
650 TABLET ORAL EVERY 6 HOURS PRN
Status: DISCONTINUED | OUTPATIENT
Start: 2019-09-19 | End: 2019-10-03 | Stop reason: HOSPADM

## 2019-09-19 RX ORDER — ONDANSETRON 2 MG/ML
4 INJECTION INTRAMUSCULAR; INTRAVENOUS ONCE
Status: COMPLETED | OUTPATIENT
Start: 2019-09-19 | End: 2019-09-19

## 2019-09-19 RX ORDER — LATANOPROST 50 UG/ML
1 SOLUTION/ DROPS OPHTHALMIC EVERY EVENING
Status: DISCONTINUED | OUTPATIENT
Start: 2019-09-19 | End: 2019-10-03 | Stop reason: HOSPADM

## 2019-09-19 RX ORDER — AMOXICILLIN 250 MG
2 CAPSULE ORAL 2 TIMES DAILY
Status: DISCONTINUED | OUTPATIENT
Start: 2019-09-19 | End: 2019-09-20 | Stop reason: SINTOL

## 2019-09-19 RX ORDER — POLYETHYLENE GLYCOL 3350 17 G/17G
1 POWDER, FOR SOLUTION ORAL
Status: DISCONTINUED | OUTPATIENT
Start: 2019-09-19 | End: 2019-09-26

## 2019-09-19 RX ORDER — POLYVINYL ALCOHOL 14 MG/ML
1 SOLUTION/ DROPS OPHTHALMIC EVERY MORNING
Status: DISCONTINUED | OUTPATIENT
Start: 2019-09-20 | End: 2019-10-03 | Stop reason: HOSPADM

## 2019-09-19 RX ORDER — LABETALOL HYDROCHLORIDE 5 MG/ML
10 INJECTION, SOLUTION INTRAVENOUS EVERY 4 HOURS PRN
Status: DISCONTINUED | OUTPATIENT
Start: 2019-09-19 | End: 2019-10-03 | Stop reason: HOSPADM

## 2019-09-19 RX ORDER — ONDANSETRON 4 MG/1
4 TABLET, ORALLY DISINTEGRATING ORAL EVERY 4 HOURS PRN
Status: DISCONTINUED | OUTPATIENT
Start: 2019-09-19 | End: 2019-09-19

## 2019-09-19 RX ORDER — BISACODYL 10 MG
10 SUPPOSITORY, RECTAL RECTAL
Status: DISCONTINUED | OUTPATIENT
Start: 2019-09-19 | End: 2019-09-26

## 2019-09-19 RX ORDER — LEVOTHYROXINE SODIUM 112 UG/1
112 TABLET ORAL
Status: DISCONTINUED | OUTPATIENT
Start: 2019-09-20 | End: 2019-10-03 | Stop reason: HOSPADM

## 2019-09-19 RX ADMIN — SODIUM CHLORIDE: 9 INJECTION, SOLUTION INTRAVENOUS at 21:00

## 2019-09-19 RX ADMIN — LATANOPROST 1 DROP: 50 SOLUTION OPHTHALMIC at 21:42

## 2019-09-19 RX ADMIN — IOHEXOL 100 ML: 350 INJECTION, SOLUTION INTRAVENOUS at 17:00

## 2019-09-19 RX ADMIN — SENNOSIDES, DOCUSATE SODIUM 2 TABLET: 50; 8.6 TABLET, FILM COATED ORAL at 20:36

## 2019-09-19 RX ADMIN — ONDANSETRON 4 MG: 2 INJECTION INTRAMUSCULAR; INTRAVENOUS at 16:03

## 2019-09-19 RX ADMIN — METOPROLOL TARTRATE 25 MG: 25 TABLET, FILM COATED ORAL at 20:36

## 2019-09-19 ASSESSMENT — ENCOUNTER SYMPTOMS
DIARRHEA: 0
DIZZINESS: 0
BLURRED VISION: 0
HEARTBURN: 0
COUGH: 0
SHORTNESS OF BREATH: 0
HEADACHES: 0
BACK PAIN: 0
WHEEZING: 0
DIAPHORESIS: 0
BLOOD IN STOOL: 1
FLANK PAIN: 0
SORE THROAT: 0
PALPITATIONS: 0
FEVER: 0
CONSTIPATION: 0
ABDOMINAL PAIN: 0
NECK PAIN: 0
NAUSEA: 0
CHILLS: 0
NERVOUS/ANXIOUS: 0
VOMITING: 0

## 2019-09-19 ASSESSMENT — PATIENT HEALTH QUESTIONNAIRE - PHQ9
2. FEELING DOWN, DEPRESSED, IRRITABLE, OR HOPELESS: NOT AT ALL
1. LITTLE INTEREST OR PLEASURE IN DOING THINGS: NOT AT ALL
SUM OF ALL RESPONSES TO PHQ9 QUESTIONS 1 AND 2: 0
SUM OF ALL RESPONSES TO PHQ9 QUESTIONS 1 AND 2: 0
1. LITTLE INTEREST OR PLEASURE IN DOING THINGS: NOT AT ALL
2. FEELING DOWN, DEPRESSED, IRRITABLE, OR HOPELESS: NOT AT ALL

## 2019-09-19 ASSESSMENT — COGNITIVE AND FUNCTIONAL STATUS - GENERAL
DAILY ACTIVITIY SCORE: 22
TURNING FROM BACK TO SIDE WHILE IN FLAT BAD: A LITTLE
HELP NEEDED FOR BATHING: A LITTLE
SUGGESTED CMS G CODE MODIFIER MOBILITY: CJ
MOBILITY SCORE: 21
STANDING UP FROM CHAIR USING ARMS: A LITTLE
WALKING IN HOSPITAL ROOM: A LITTLE
TOILETING: A LITTLE
SUGGESTED CMS G CODE MODIFIER DAILY ACTIVITY: CJ

## 2019-09-19 ASSESSMENT — LIFESTYLE VARIABLES
EVER_SMOKED: NEVER
ALCOHOL_USE: NO

## 2019-09-19 NOTE — ED PROVIDER NOTES
ED Provider Note    CHIEF COMPLAINT  Chief Complaint   Patient presents with   • Bloody Stools   • Nausea       HPI  Марина Giron is a 88 y.o. female who presents with a history of chronic back pain, hypertension, hypothyroidism, diverticulosis, erosive gastroduodenitis, and multiple colon polyps with previous GI bleeds, presents with intermittent left-sided abdominal pain for the last 3 to 4 days, she generally has not been feeling well, and then today had 3 large bloody bowel movements with red blood and some dark clots.  The patient has had nausea with no vomiting.  The history is obtained from the patient's daughter, the history is limited from the patient because she has dementia.  Further HPI cannot be obtained for the patient secondary to her dementia.    REVIEW OF SYSTEMS  Review of systems cannot be obtained secondary patient's dementia.    PAST MEDICAL HISTORY   has a past medical history of Chronic back pain, Diverticulitis, Hypertension, and Hypothyroid.    SOCIAL HISTORY  Social History     Tobacco Use   • Smoking status: Never Smoker   • Smokeless tobacco: Never Used   Substance and Sexual Activity   • Alcohol use: No   • Drug use: No   • Sexual activity: Not on file       SURGICAL HISTORY   has a past surgical history that includes colonoscopy - endo (5/24/2009); colonoscopy with polyp (10/11/2014); gastroscopy (N/A, 5/14/2019); and colonoscopy (N/A, 5/14/2019).    CURRENT MEDICATIONS  Home Medications     Reviewed by Kaushik Britt (Pharmacy Tech) on 09/19/19 at 2929  Med List Status: Complete   Medication Last Dose Status   B Complex-C (SUPER B COMPLEX/VITAMIN C) Tab 9/19/2019 Active   levothyroxine (SYNTHROID) 112 MCG Tab 9/19/2019 Active   metoprolol (LOPRESSOR) 25 MG Tab 9/19/2019 Active   Multiple Vitamins-Minerals (PRESERVISION AREDS 2+MULTI VIT PO) 9/19/2019 Active   Polyethyl Glycol-Propyl Glycol (SYSTANE OP) 9/19/2019 Active   travoprost (TRAVATAN Z) 0.004 % Solution  "9/18/2019 Active                  ALLERGIES  Allergies   Allergen Reactions   • Codeine      Upset stomach.   • Neosporin [Neomycin-Bacitracin-Polymyxin] Rash       PHYSICAL EXAM  VITAL SIGNS: /75   Pulse 89   Temp 36.2 °C (97.2 °F) (Temporal)   Resp 14   Ht 1.6 m (5' 3\")   Wt 81 kg (178 lb 9.2 oz)   SpO2 97%   BMI 31.63 kg/m²  @PIPER[[id number]::@   Pulse ox interpretation: I interpret this pulse ox as normal.  Constitutional: Alert, appears pale and generally weak.  HENT: No signs of trauma, Bilateral external ears normal, Nose normal.   Eyes: Pupils are equal and reactive, Conjunctiva normal, Non-icteric.   Neck: Normal range of motion, No tenderness, Supple, No stridor.   Lymphatic: No lymphadenopathy noted.   Cardiovascular: Regular rate and rhythm, no murmurs.   Thorax & Lungs: Normal breath sounds, No respiratory distress, No wheezing, No chest tenderness.   Abdomen: Bowel sounds normal, Soft, No tenderness, No masses, No pulsatile masses. No peritoneal signs.  Skin: Warm, Dry, No erythema, No rash.   Back: No bony tenderness, No CVA tenderness.   Extremities: Intact distal pulses, No edema, No tenderness, No cyanosis.  Musculoskeletal: Good range of motion in all major joints. No tenderness to palpation or major deformities noted.   Neurologic: Alert , Normal motor function, Normal sensory function, No focal deficits noted.   Psychiatric: Affect normal, Judgment normal, Mood normal.       DIAGNOSTIC STUDIES / PROCEDURES        LABS  Labs Reviewed   CBC WITH DIFFERENTIAL - Abnormal; Notable for the following components:       Result Value    Hemoglobin 11.9 (*)     MCHC 30.6 (*)     RDW 56.3 (*)     All other components within normal limits    Narrative:     Indicate which anticoagulants the patient is on:->UNKNOWN   COMP METABOLIC PANEL - Abnormal; Notable for the following components:    Glucose 108 (*)     All other components within normal limits    Narrative:     Indicate which anticoagulants the " patient is on:->UNKNOWN   PROTHROMBIN TIME    Narrative:     Indicate which anticoagulants the patient is on:->UNKNOWN   APTT    Narrative:     Indicate which anticoagulants the patient is on:->UNKNOWN   COD (ADULT)   ESTIMATED GFR    Narrative:     Indicate which anticoagulants the patient is on:->UNKNOWN   LACTIC ACID         RADIOLOGY  CT-ABDOMEN-PELVIS WITH   Final Result      1.  Colonic diverticulosis without evidence for diverticulitis.   2.  No CT evidence for colitis or diverticulitis.      DX-CHEST-PORTABLE (1 VIEW)   Final Result      1.  Mild pulmonary vascular congestion, improved from prior exam.   2.  No lobar pneumonia or overt pulmonary edema.   3.  Double layer calcification of the aortic knob.  Dissection is not excluded.   4.  Prominent central pulmonary vasculature are again seen suggesting pulmonary hypertension.   5.  Stable cardiomegaly.              COURSE & MEDICAL DECISION MAKING  Pertinent Labs & Imaging studies reviewed. (See chart for details)    I reviewed the records from May 12, 2019, at that time there is a discharge summary after the patient was admitted for bright red blood per rectum.  At that time she was anemic at presentation H&H 10 and 30.  During that admission her hemoglobin dropped to 8.8 and then 8.5.  At that time the patient had a CT scan which showed multiple colon polyps as well as diverticulosis and erosive gastroduodenitis.  In addition internal hemorrhoids grade 1.  EGD was performed at that time which showed erosive gastritis and duodenitis without active bleeding, colonoscopy showed multiple polyps which were removed and sent for pathology and diverticular disease with no active bleeding.    Patient is given Zofran 4 mg IV for nausea.  She is kept n.p.o.    5:25 PM -the patient's CT scan shows only diverticulosis.  At this point the patient will be admitted by the [facility] hospital[facility] Dr. Clifton.  I spoke with Dr. Lozoya who is on for GI for Dr. Cunningham.  He will  consult tomorrow.  The patient is admitted in fair condition.        FINAL IMPRESSION  1. BRBPR (bright red blood per rectum)     2. Left lower quadrant pain             Electronically signed by: Rick Vega, 9/19/2019 2:54 PM

## 2019-09-19 NOTE — ED TRIAGE NOTES
Pt bib family. Pt having bloody stools, bright red with clots. Pt smells of stool. Pt c/o abd pain and nausea. Hx of diverticulitis.

## 2019-09-20 ENCOUNTER — APPOINTMENT (OUTPATIENT)
Dept: RADIOLOGY | Facility: MEDICAL CENTER | Age: 84
DRG: 378 | End: 2019-09-20
Attending: INTERNAL MEDICINE
Payer: MEDICARE

## 2019-09-20 PROBLEM — R41.89 COGNITIVE IMPAIRMENT: Status: ACTIVE | Noted: 2019-09-20

## 2019-09-20 PROBLEM — R53.81 DEBILITY: Status: ACTIVE | Noted: 2019-09-20

## 2019-09-20 LAB
ANION GAP SERPL CALC-SCNC: 3 MMOL/L (ref 0–11.9)
BASOPHILS # BLD AUTO: 0.4 % (ref 0–1.8)
BASOPHILS # BLD: 0.03 K/UL (ref 0–0.12)
BUN SERPL-MCNC: 14 MG/DL (ref 8–22)
CALCIUM SERPL-MCNC: 9.2 MG/DL (ref 8.5–10.5)
CHLORIDE SERPL-SCNC: 104 MMOL/L (ref 96–112)
CO2 SERPL-SCNC: 32 MMOL/L (ref 20–33)
CREAT SERPL-MCNC: 0.45 MG/DL (ref 0.5–1.4)
EOSINOPHIL # BLD AUTO: 0.16 K/UL (ref 0–0.51)
EOSINOPHIL NFR BLD: 2.1 % (ref 0–6.9)
ERYTHROCYTE [DISTWIDTH] IN BLOOD BY AUTOMATED COUNT: 55.9 FL (ref 35.9–50)
GLUCOSE SERPL-MCNC: 91 MG/DL (ref 65–99)
HCT VFR BLD AUTO: 32.3 % (ref 37–47)
HGB BLD-MCNC: 10.4 G/DL (ref 12–16)
HGB BLD-MCNC: 9.8 G/DL (ref 12–16)
HGB BLD-MCNC: 9.9 G/DL (ref 12–16)
IMM GRANULOCYTES # BLD AUTO: 0.02 K/UL (ref 0–0.11)
IMM GRANULOCYTES NFR BLD AUTO: 0.3 % (ref 0–0.9)
LYMPHOCYTES # BLD AUTO: 1.66 K/UL (ref 1–4.8)
LYMPHOCYTES NFR BLD: 22.1 % (ref 22–41)
MCH RBC QN AUTO: 27.7 PG (ref 27–33)
MCHC RBC AUTO-ENTMCNC: 30.7 G/DL (ref 33.6–35)
MCV RBC AUTO: 90.2 FL (ref 81.4–97.8)
MONOCYTES # BLD AUTO: 0.88 K/UL (ref 0–0.85)
MONOCYTES NFR BLD AUTO: 11.7 % (ref 0–13.4)
NEUTROPHILS # BLD AUTO: 4.75 K/UL (ref 2–7.15)
NEUTROPHILS NFR BLD: 63.4 % (ref 44–72)
NRBC # BLD AUTO: 0 K/UL
NRBC BLD-RTO: 0 /100 WBC
PLATELET # BLD AUTO: 176 K/UL (ref 164–446)
PMV BLD AUTO: 9.1 FL (ref 9–12.9)
POTASSIUM SERPL-SCNC: 3.6 MMOL/L (ref 3.6–5.5)
RBC # BLD AUTO: 3.58 M/UL (ref 4.2–5.4)
SODIUM SERPL-SCNC: 139 MMOL/L (ref 135–145)
WBC # BLD AUTO: 7.5 K/UL (ref 4.8–10.8)

## 2019-09-20 PROCEDURE — 99232 SBSQ HOSP IP/OBS MODERATE 35: CPT | Performed by: HOSPITALIST

## 2019-09-20 PROCEDURE — 700105 HCHG RX REV CODE 258: Performed by: FAMILY MEDICINE

## 2019-09-20 PROCEDURE — 36415 COLL VENOUS BLD VENIPUNCTURE: CPT

## 2019-09-20 PROCEDURE — 85018 HEMOGLOBIN: CPT

## 2019-09-20 PROCEDURE — A9560 TC99M LABELED RBC: HCPCS

## 2019-09-20 PROCEDURE — 700102 HCHG RX REV CODE 250 W/ 637 OVERRIDE(OP): Performed by: FAMILY MEDICINE

## 2019-09-20 PROCEDURE — 770006 HCHG ROOM/CARE - MED/SURG/GYN SEMI*

## 2019-09-20 PROCEDURE — 80048 BASIC METABOLIC PNL TOTAL CA: CPT

## 2019-09-20 PROCEDURE — 85025 COMPLETE CBC W/AUTO DIFF WBC: CPT

## 2019-09-20 PROCEDURE — A9270 NON-COVERED ITEM OR SERVICE: HCPCS | Performed by: FAMILY MEDICINE

## 2019-09-20 RX ADMIN — ACETAMINOPHEN 650 MG: 325 TABLET, FILM COATED ORAL at 03:55

## 2019-09-20 RX ADMIN — METOPROLOL TARTRATE 25 MG: 25 TABLET, FILM COATED ORAL at 18:08

## 2019-09-20 RX ADMIN — LEVOTHYROXINE SODIUM 112 MCG: 112 TABLET ORAL at 06:00

## 2019-09-20 RX ADMIN — POLYVINYL ALCOHOL 1 DROP: 14 SOLUTION/ DROPS OPHTHALMIC at 12:52

## 2019-09-20 RX ADMIN — SODIUM CHLORIDE: 9 INJECTION, SOLUTION INTRAVENOUS at 22:12

## 2019-09-20 RX ADMIN — LATANOPROST 1 DROP: 50 SOLUTION OPHTHALMIC at 18:09

## 2019-09-20 RX ADMIN — SENNOSIDES, DOCUSATE SODIUM 2 TABLET: 50; 8.6 TABLET, FILM COATED ORAL at 06:00

## 2019-09-20 RX ADMIN — METOPROLOL TARTRATE 25 MG: 25 TABLET, FILM COATED ORAL at 06:00

## 2019-09-20 ASSESSMENT — ENCOUNTER SYMPTOMS
SORE THROAT: 0
FEVER: 0
SPUTUM PRODUCTION: 0
CHILLS: 0
SPEECH CHANGE: 0
EYE DISCHARGE: 0
ABDOMINAL PAIN: 0
LOSS OF CONSCIOUSNESS: 0
MEMORY LOSS: 1
VOMITING: 0
NERVOUS/ANXIOUS: 1
SEIZURES: 0
BLOOD IN STOOL: 1
PALPITATIONS: 0
SHORTNESS OF BREATH: 0
STRIDOR: 0
EYE REDNESS: 0
MYALGIAS: 0
DIARRHEA: 0
BRUISES/BLEEDS EASILY: 0
EYE PAIN: 0
FOCAL WEAKNESS: 0
FLANK PAIN: 0
HEMOPTYSIS: 0
COUGH: 0

## 2019-09-20 NOTE — H&P
Hospital Medicine History & Physical Note    Date of Service  9/19/2019    Primary Care Physician  Pcp Not In Computer    Consultants  Gastroenterology consulted    Code Status  Full    Chief Complaint  Bloody stools    History of Presenting Illness  88 y.o. female who presented 9/19/2019 with 3 episodes of bloody stools today.  She denies having any abdominal pain, nausea or vomiting.  Patient with history of GI bleeding in the past, she was admitted here in May, she had endoscopy and colonoscopy done which showed erosive gastroduodenitis, multiple colon polyps, diverticulosis, internal hemorrhoids.  Has not had an episode of bleeding since then.  She did admit that she took Aleve yesterday for her headache.  Hemoglobin today stable.  Gastroenterology has been consulted on the case.    Review of Systems  Review of Systems   Constitutional: Negative for chills, diaphoresis, fever and malaise/fatigue.   HENT: Negative for hearing loss and sore throat.    Eyes: Negative for blurred vision.   Respiratory: Negative for cough, shortness of breath and wheezing.    Cardiovascular: Negative for chest pain, palpitations and leg swelling.   Gastrointestinal: Positive for blood in stool. Negative for abdominal pain, constipation, diarrhea, heartburn, melena, nausea and vomiting.   Genitourinary: Negative for dysuria, flank pain and hematuria.   Musculoskeletal: Negative for back pain and neck pain.   Skin: Negative for rash.   Neurological: Negative for dizziness and headaches.   Psychiatric/Behavioral: The patient is not nervous/anxious.        Past Medical History   has a past medical history of Chronic back pain, Diverticulitis, Hypertension, and Hypothyroid.    Surgical History   has a past surgical history that includes colonoscopy - endo (5/24/2009); colonoscopy with polyp (10/11/2014); gastroscopy (N/A, 5/14/2019); and colonoscopy (N/A, 5/14/2019).     Family History  Unknown    Social History   reports that she has  never smoked. She has never used smokeless tobacco. She reports that she does not drink alcohol or use drugs.    Allergies  Allergies   Allergen Reactions   • Codeine      Upset stomach.   • Neosporin [Neomycin-Bacitracin-Polymyxin] Rash       Medications  Prior to Admission Medications   Prescriptions Last Dose Informant Patient Reported? Taking?   B Complex-C (SUPER B COMPLEX/VITAMIN C) Tab 9/19/2019 at AM Family Member Yes Yes   Sig: Take 1 Tab by mouth every morning.   Multiple Vitamins-Minerals (PRESERVISION AREDS 2+MULTI VIT PO) 9/19/2019 at AM Family Member Yes Yes   Sig: Take 1 Dose by mouth 2 Times a Day.   Polyethyl Glycol-Propyl Glycol (SYSTANE OP) 9/19/2019 at AM Family Member Yes Yes   Sig: Place 1 Drop in both eyes every morning.   levothyroxine (SYNTHROID) 112 MCG Tab 9/19/2019 at AM Family Member Yes Yes   Sig: Take 112 mcg by mouth Every morning on an empty stomach.   metoprolol (LOPRESSOR) 25 MG Tab 9/19/2019 at AM Family Member Yes Yes   Sig: Take 25 mg by mouth 2 times a day.   travoprost (TRAVATAN Z) 0.004 % Solution 9/18/2019 at PM Family Member Yes Yes   Sig: Place 1 Drop in both eyes every evening.      Facility-Administered Medications: None       Physical Exam  Temp:  [36.2 °C (97.2 °F)-36.3 °C (97.3 °F)] 36.3 °C (97.3 °F)  Pulse:  [83-89] 83  Resp:  [14-19] 19  BP: (126-157)/(75-88) 143/88  SpO2:  [92 %-97 %] 96 %    Physical Exam   Constitutional: She appears well-developed and well-nourished.   HENT:   Head: Normocephalic and atraumatic.   Eyes: Pupils are equal, round, and reactive to light. Conjunctivae are normal.   Neck: No tracheal deviation present. No thyromegaly present.   Cardiovascular: Normal rate and regular rhythm.   Pulmonary/Chest: Effort normal and breath sounds normal.   Abdominal: Soft. Bowel sounds are normal. She exhibits no distension. There is no tenderness. There is no rebound and no guarding.   Musculoskeletal: She exhibits edema.   Lymphadenopathy:     She has no  cervical adenopathy.   Neurological: She is alert.   Oriented to person and place   Skin: Skin is warm and dry.   Nursing note and vitals reviewed.      Laboratory:  Recent Labs     09/19/19  1444   WBC 7.8   RBC 4.29   HEMOGLOBIN 11.9*   HEMATOCRIT 38.9   MCV 90.7   MCH 27.7   MCHC 30.6*   RDW 56.3*   PLATELETCT 212   MPV 9.7     Recent Labs     09/19/19  1444   SODIUM 141   POTASSIUM 4.4   CHLORIDE 102   CO2 30   GLUCOSE 108*   BUN 12   CREATININE 0.57   CALCIUM 10.0     Recent Labs     09/19/19  1444   ALTSGPT 9   ASTSGOT 21   ALKPHOSPHAT 75   TBILIRUBIN 0.5   GLUCOSE 108*     Recent Labs     09/19/19  1444   APTT 25.3   INR 0.92     No results for input(s): NTPROBNP in the last 72 hours.      No results for input(s): TROPONINT in the last 72 hours.    Urinalysis:    No results found     Imaging:  CT-ABDOMEN-PELVIS WITH   Final Result      1.  Colonic diverticulosis without evidence for diverticulitis.   2.  No CT evidence for colitis or diverticulitis.      DX-CHEST-PORTABLE (1 VIEW)   Final Result      1.  Mild pulmonary vascular congestion, improved from prior exam.   2.  No lobar pneumonia or overt pulmonary edema.   3.  Double layer calcification of the aortic knob.  Dissection is not excluded.   4.  Prominent central pulmonary vasculature are again seen suggesting pulmonary hypertension.   5.  Stable cardiomegaly.            Assessment/Plan:  I anticipate this patient is appropriate for observation status at this time.    * GI bleed- (present on admission)  Assessment & Plan  Clear liquids  N.p.o. at midnight  Serial hemoglobin    Benign essential HTN- (present on admission)  Assessment & Plan  Continue metoprolol    Hypothyroid- (present on admission)  Assessment & Plan  Continue Synthroid        VTE prophylaxis: SCD

## 2019-09-20 NOTE — PROGRESS NOTES
2 RN skin check complete.Wilma RN  Devices in place:piv,scds  Skin assessed under devices:yes  Confirmed pressure ulcers found on NONE  New potential pressure ulcers noted on N/A. Wound consult place NA  The following interventions in place,every 2 hours turning,clean linen,moisturizer to coccyx and buttocks.    Bilateral upper and lower extremities skin intact,biateral lower extremities skin intact,bilateral elbows and ears  pink and blanching,buttocks and coccyx pink and blanching,bilateral arms fragile and some bruises,otherwise skin intact.

## 2019-09-20 NOTE — CARE PLAN
Problem: Safety  Goal: Will remain free from injury  Outcome: PROGRESSING SLOWER THAN EXPECTED  Goal: Will remain free from falls  9/20/2019 0719 by Miriam Dent R.N.  Outcome: PROGRESSING AS EXPECTED  9/19/2019 2312 by Miriam Dent R.N.  Outcome: PROGRESSING AS EXPECTED

## 2019-09-20 NOTE — CONSULTS
Date of Consultation:  9/20/2019    Patient: : Марина Giron  MRN: [id number]    Referring Physician: Abbi Ureña M.D.       GI:Angela Simons M.D.     Reason for Consultation: Bloody stools    History of Present Illness: History is limited because of dementia, most history obtained from chart review.  88 years old female with history of dementia, chronic back pain, hypertension, hypothyroidism, diverticulosis, erosive gastroduodenitis and multiple colon polyps status post polypectomy done in May 2019 was brought into the emergency department because of intermittent left-sided abdominal pain and 3 large bloody bowel movements with red blood and some dark clots.  Patient was admitted to the hospital in May 2019 because of similar symptoms, EGD and colonoscopy was done showed multiple polyps, erosive gastroduodenitis and diverticulosis with internal hemorrhoids she was started on PPI.  This presentation patient denying abdominal pain, nausea and vomiting only significant for bright red blood with the stool and black clots.  Her hemoglobin is improving or at baseline compared to May was 9.2, BUN within normal.  Patient took Aleve for back pain recently.  CT scan of abdomen and pelvis done yesterday no significant finding apart from diverticulosis.    Past Medical History:   Diagnosis Date   • Chronic back pain    • Diverticulitis    • Hypertension    • Hypothyroid          Past Surgical History:   Procedure Laterality Date   • GASTROSCOPY N/A 5/14/2019    Procedure: GASTROSCOPY;  Surgeon: Elvis Cunningham M.D.;  Location: [facility];  Service: Gastroenterology   • COLONOSCOPY N/A 5/14/2019    Procedure: COLONOSCOPY;  Surgeon: Elvis Cunningham M.D.;  Location: SURGERY [facility];  Service: Gastroenterology   • COLONOSCOPY WITH POLYP  10/11/2014    Performed by Samuel Mejia M.D. at ENDOSCOPY [facility]   • COLONOSCOPY - ENDO  5/24/2009    Performed by KERRY PADGETT at  ENDOSCOPY [facility]       No family history on file.    Social History     Socioeconomic History   • Marital status:      Spouse name: Not on file   • Number of children: Not on file   • Years of education: Not on file   • Highest education level: Not on file   Occupational History   • Not on file   Social Needs   • Financial resource strain: Not on file   • Food insecurity:     Worry: Not on file     Inability: Not on file   • Transportation needs:     Medical: Not on file     Non-medical: Not on file   Tobacco Use   • Smoking status: Never Smoker   • Smokeless tobacco: Never Used   Substance and Sexual Activity   • Alcohol use: No   • Drug use: No   • Sexual activity: Not on file   Lifestyle   • Physical activity:     Days per week: Not on file     Minutes per session: Not on file   • Stress: Not on file   Relationships   • Social connections:     Talks on phone: Not on file     Gets together: Not on file     Attends Buddhism service: Not on file     Active member of club or organization: Not on file     Attends meetings of clubs or organizations: Not on file     Relationship status: Not on file   • Intimate partner violence:     Fear of current or ex partner: Not on file     Emotionally abused: Not on file     Physically abused: Not on file     Forced sexual activity: Not on file   Other Topics Concern   • Not on file   Social History Narrative   • Not on file       Review of Systems   Unable to perform ROS: Dementia         Physical Exam:  Vitals:    09/19/19 1900 09/19/19 1946 09/20/19 0325 09/20/19 0755   BP: 126/81 143/88 132/71 142/72   Pulse:  83 79 91   Resp:  19 18 16   Temp:  36.3 °C (97.3 °F) 36.4 °C (97.6 °F) 36.1 °C (96.9 °F)   TempSrc:  Temporal Temporal Temporal   SpO2:   97% 95%   Weight:       Height:           Physical Exam   Constitutional: No distress.   Cardiovascular: Normal rate.   No murmur heard.  Pulmonary/Chest: No respiratory distress.   Abdominal: Soft. She exhibits no  distension. There is no tenderness. There is no rebound.   Musculoskeletal: She exhibits edema.   Neurological: She is alert.   A&O x1-2     Skin: She is not diaphoretic.   Nursing note and vitals reviewed.        Labs:  Recent Labs     09/19/19  1444 09/19/19  2217 09/20/19  0319 09/20/19  0940   WBC 7.8  --  7.5  --    RBC 4.29  --  3.58*  --    HEMOGLOBIN 11.9* 10.4* 9.9*  9.9* 9.8*   HEMATOCRIT 38.9  --  32.3*  --    MCV 90.7  --  90.2  --    MCH 27.7  --  27.7  --    MCHC 30.6*  --  30.7*  --    RDW 56.3*  --  55.9*  --    PLATELETCT 212  --  176  --    MPV 9.7  --  9.1  --      Recent Labs     09/19/19  1444 09/20/19  0319   SODIUM 141 139   POTASSIUM 4.4 3.6   CHLORIDE 102 104   CO2 30 32   GLUCOSE 108* 91   BUN 12 14     Recent Labs     09/19/19  1444   APTT 25.3   INR 0.92         Imaging:  CT-ABDOMEN-PELVIS WITH   Final Result      1.  Colonic diverticulosis without evidence for diverticulitis.   2.  No CT evidence for colitis or diverticulitis.      DX-CHEST-PORTABLE (1 VIEW)   Final Result      1.  Mild pulmonary vascular congestion, improved from prior exam.   2.  No lobar pneumonia or overt pulmonary edema.   3.  Double layer calcification of the aortic knob.  Dissection is not excluded.   4.  Prominent central pulmonary vasculature are again seen suggesting pulmonary hypertension.   5.  Stable cardiomegaly.                Impressions:  1. Multiple colon polyps s/p polypectomy.  2. Erosive gastroduodenitis  3. Colonic diverticulosis.  4. Internal hemorrhoids, grade 1  5. Anemia, stable  6. Hypothyroidism  7.Chronic back pain  8. Hypertension    88 years old female with history of dementia, gastroduodenitis, multiple colonic polyps status post polypectomy, and anemia and hemorrhoids presented to the emergency department because of 3 episodes of bloody stool bright red with a black clots, history is limited because of dementia but on examination there is no tenderness, her hemoglobin is stable at  baseline compared to the last hospital admission in May 2019, vital signs are stable.  Since that admission she had 2 bloody bowel movements.    Recommendations:  At this point patient vitals and Hb stable, possible source of bleed including Hemorrhoids and diverticulosis.  - continue monitoring H7H   - ordered Bleeding scan to rule out serious bleed.  - will plan bedside anoscopy.   - will follow up results.           This note was generated using voice recognition software which has a small chance of producing errors of grammar and possibly content. I have made every reasonable attempt to find and correct any obvious errors, but expect that some may not be found prior to finalization of this note.

## 2019-09-20 NOTE — ED NOTES
Med Rec Updated and Complete per Pt's family at bedside with List (returned)  Allergies Reviewed  No PO ABX last 14 days.

## 2019-09-20 NOTE — PROGRESS NOTES
Patient alert/oriented x4,on 4Lof oxygen per nasal cannula without distress ,assumed care given at start of the shift, Patient had a small bloody stool and will continue to monitor,call light and personal belongings within reach,bed in low position and bed alarm on.

## 2019-09-20 NOTE — PROGRESS NOTES
[facility] Medicine Daily Progress Note    Date of Service  9/20/2019    Chief Complaint  88 y.o. female admitted 9/19/2019 with lower gastrointestinal bleeding    Hospital Course      88 years old female with past medical history of colonic polyposis, diverticulosis, admitted 9/19 with lower GI bleeding.  Gastroenterology have been consulted.        Interval Problem Update  Heart rate 80s-90s, blood pressure within normal limits this morning.  Saturating well on room air.  Alert, variably and intermittently oriented x1-x2, self and place.  Gastroenterology has been consulted, plan for anoscopy today.  Hemoglobin trended as 10.4, then 9.9, then 9.8  Discussed with patient, patient's nurse and with multidisciplinary team during rounds including , and charge nurse.      Consultants/Specialty  Gastroenterology    Code Status  Full code    Disposition  To be determined, pending PT/OT when able    Review of Systems  Review of Systems   Constitutional: Negative for chills and fever.   HENT: Negative for congestion and sore throat.    Eyes: Negative for pain, discharge and redness.   Respiratory: Negative for cough, hemoptysis, sputum production, shortness of breath and stridor.    Cardiovascular: Negative for chest pain, palpitations and leg swelling.   Gastrointestinal: Positive for blood in stool. Negative for abdominal pain, diarrhea and vomiting.   Genitourinary: Negative for flank pain, hematuria and urgency.   Musculoskeletal: Negative for myalgias.   Skin: Negative.    Neurological: Negative for speech change, focal weakness, seizures and loss of consciousness.   Endo/Heme/Allergies: Does not bruise/bleed easily.   Psychiatric/Behavioral: Positive for memory loss. The patient is nervous/anxious.       Physical Exam  Temp:  [36.1 °C (96.9 °F)-36.9 °C (98.5 °F)] 36.9 °C (98.5 °F)  Pulse:  [79-91] 81  Resp:  [16-18] 16  BP: (130-152)/(70-73) 152/73  SpO2:  [95 %-97 %] 96 %    Physical Exam   Constitutional:  No distress.   Elderly, friable   HENT:   Head: Normocephalic and atraumatic.   Right Ear: External ear normal.   Left Ear: External ear normal.   Eyes: Pupils are equal, round, and reactive to light. Conjunctivae are normal. Right eye exhibits no discharge. Left eye exhibits no discharge.   Neck: Normal range of motion. Neck supple. No JVD present. No tracheal deviation present. No thyromegaly present.   Cardiovascular: Exam reveals no gallop and no friction rub.   No murmur heard.  Pulmonary/Chest: Effort normal and breath sounds normal. No stridor. No respiratory distress. She has no wheezes. She has no rales. She exhibits no tenderness.   Abdominal: Soft. Bowel sounds are normal. She exhibits no distension. There is no tenderness. There is no rebound and no guarding.   Musculoskeletal: Normal range of motion. She exhibits no edema, tenderness or deformity.   Neurological: She is alert. No cranial nerve deficit. Coordination normal.   Alert, oriented x1-2.  Self and place.   Skin: Skin is warm and dry. No rash noted. She is not diaphoretic. No erythema. There is pallor.   Psychiatric: She has a normal mood and affect.   Slowed    Nursing note and vitals reviewed.    Fluids    Intake/Output Summary (Last 24 hours) at 9/20/2019 2016  Last data filed at 9/20/2019 1915  Gross per 24 hour   Intake 120 ml   Output --   Net 120 ml       Laboratory  Recent Labs     09/19/19  1444  09/20/19 0319 09/20/19  0940 09/20/19  1720   WBC 7.8  --  7.5  --   --    RBC 4.29  --  3.58*  --   --    HEMOGLOBIN 11.9*   < > 9.9*  9.9* 9.8* 10.4*   HEMATOCRIT 38.9  --  32.3*  --   --    MCV 90.7  --  90.2  --   --    MCH 27.7  --  27.7  --   --    MCHC 30.6*  --  30.7*  --   --    RDW 56.3*  --  55.9*  --   --    PLATELETCT 212  --  176  --   --    MPV 9.7  --  9.1  --   --     < > = values in this interval not displayed.     Recent Labs     09/19/19  1444 09/20/19 0319   SODIUM 141 139   POTASSIUM 4.4 3.6   CHLORIDE 102 104   CO2 30  32   GLUCOSE 108* 91   BUN 12 14   CREATININE 0.57 0.45*   CALCIUM 10.0 9.2     Recent Labs     09/19/19  1444   APTT 25.3   INR 0.92               Imaging  NM-GI BLEEDING SCAN   Final Result      Unremarkable scan for GI bleeding.      CT-ABDOMEN-PELVIS WITH   Final Result      1.  Colonic diverticulosis without evidence for diverticulitis.   2.  No CT evidence for colitis or diverticulitis.      DX-CHEST-PORTABLE (1 VIEW)   Final Result      1.  Mild pulmonary vascular congestion, improved from prior exam.   2.  No lobar pneumonia or overt pulmonary edema.   3.  Double layer calcification of the aortic knob.  Dissection is not excluded.   4.  Prominent central pulmonary vasculature are again seen suggesting pulmonary hypertension.   5.  Stable cardiomegaly.         Assessment/Plan  * GI bleed- (present on admission)  Assessment & Plan  Clear liquids   Serial hemoglobin  Gastroenterology consulted.   Plan for anoscopy 9/20.    Debility- (present on admission)  Assessment & Plan  Multifactorial, age, gastrointestinal bleeding  Physical and occupational therapy evaluation when able    Cognitive impairment  Assessment & Plan  Unclear baseline.  Likely undiagnosed dementia.  Attempts to contact family    Benign essential HTN- (present on admission)  Assessment & Plan  Continue metoprolol     Hypothyroid- (present on admission)  Assessment & Plan  Continue Synthroid       VTE prophylaxis: SCDs, gastrointestinal bleeding

## 2019-09-20 NOTE — ASSESSMENT & PLAN NOTE
-GI consulted - s/p anoscopy 9/20.  Culprit likely diverticular bleed  -GI re-consult 9/26 - recommended  hydrocortisone 25 mg suppository twice daily x10 days and add 1 tablespoon of Metamucil with each meal.    -trending Hgb - stable. Transfuse for Hgb <7  -normal BM 9/30

## 2019-09-21 LAB
ANION GAP SERPL CALC-SCNC: 18 MMOL/L (ref 0–11.9)
BASOPHILS # BLD AUTO: 0.3 % (ref 0–1.8)
BASOPHILS # BLD: 0.02 K/UL (ref 0–0.12)
BUN SERPL-MCNC: 14 MG/DL (ref 8–22)
CALCIUM SERPL-MCNC: 9.2 MG/DL (ref 8.4–10.2)
CHLORIDE SERPL-SCNC: 103 MMOL/L (ref 96–112)
CO2 SERPL-SCNC: 21 MMOL/L (ref 20–33)
CREAT SERPL-MCNC: 0.35 MG/DL (ref 0.5–1.4)
EOSINOPHIL # BLD AUTO: 0.21 K/UL (ref 0–0.51)
EOSINOPHIL NFR BLD: 3.5 % (ref 0–6.9)
ERYTHROCYTE [DISTWIDTH] IN BLOOD BY AUTOMATED COUNT: 56.5 FL (ref 35.9–50)
GLUCOSE SERPL-MCNC: 85 MG/DL (ref 65–99)
HCT VFR BLD AUTO: 31 % (ref 37–47)
HGB BLD-MCNC: 9.6 G/DL (ref 12–16)
IMM GRANULOCYTES # BLD AUTO: 0.02 K/UL (ref 0–0.11)
IMM GRANULOCYTES NFR BLD AUTO: 0.3 % (ref 0–0.9)
LYMPHOCYTES # BLD AUTO: 1.58 K/UL (ref 1–4.8)
LYMPHOCYTES NFR BLD: 26.2 % (ref 22–41)
MAGNESIUM SERPL-MCNC: 1.9 MG/DL (ref 1.5–2.5)
MCH RBC QN AUTO: 28.5 PG (ref 27–33)
MCHC RBC AUTO-ENTMCNC: 31 G/DL (ref 33.6–35)
MCV RBC AUTO: 92 FL (ref 81.4–97.8)
MONOCYTES # BLD AUTO: 0.65 K/UL (ref 0–0.85)
MONOCYTES NFR BLD AUTO: 10.8 % (ref 0–13.4)
NEUTROPHILS # BLD AUTO: 3.56 K/UL (ref 2–7.15)
NEUTROPHILS NFR BLD: 58.9 % (ref 44–72)
NRBC # BLD AUTO: 0 K/UL
NRBC BLD-RTO: 0 /100 WBC
PLATELET # BLD AUTO: 170 K/UL (ref 164–446)
PMV BLD AUTO: 9.2 FL (ref 9–12.9)
POTASSIUM SERPL-SCNC: 4.1 MMOL/L (ref 3.6–5.5)
RBC # BLD AUTO: 3.37 M/UL (ref 4.2–5.4)
SODIUM SERPL-SCNC: 142 MMOL/L (ref 135–145)
WBC # BLD AUTO: 6 K/UL (ref 4.8–10.8)

## 2019-09-21 PROCEDURE — 80048 BASIC METABOLIC PNL TOTAL CA: CPT

## 2019-09-21 PROCEDURE — 700102 HCHG RX REV CODE 250 W/ 637 OVERRIDE(OP): Performed by: FAMILY MEDICINE

## 2019-09-21 PROCEDURE — 99232 SBSQ HOSP IP/OBS MODERATE 35: CPT | Performed by: HOSPITALIST

## 2019-09-21 PROCEDURE — 83735 ASSAY OF MAGNESIUM: CPT

## 2019-09-21 PROCEDURE — 770006 HCHG ROOM/CARE - MED/SURG/GYN SEMI*

## 2019-09-21 PROCEDURE — 36415 COLL VENOUS BLD VENIPUNCTURE: CPT

## 2019-09-21 PROCEDURE — 85025 COMPLETE CBC W/AUTO DIFF WBC: CPT

## 2019-09-21 PROCEDURE — A9270 NON-COVERED ITEM OR SERVICE: HCPCS | Performed by: FAMILY MEDICINE

## 2019-09-21 RX ADMIN — METOPROLOL TARTRATE 25 MG: 25 TABLET, FILM COATED ORAL at 05:41

## 2019-09-21 RX ADMIN — METOPROLOL TARTRATE 25 MG: 25 TABLET, FILM COATED ORAL at 17:38

## 2019-09-21 RX ADMIN — LEVOTHYROXINE SODIUM 112 MCG: 112 TABLET ORAL at 05:41

## 2019-09-21 RX ADMIN — LATANOPROST 1 DROP: 50 SOLUTION OPHTHALMIC at 17:38

## 2019-09-21 RX ADMIN — POLYVINYL ALCOHOL 1 DROP: 14 SOLUTION/ DROPS OPHTHALMIC at 05:41

## 2019-09-21 ASSESSMENT — ENCOUNTER SYMPTOMS
CHILLS: 0
FLANK PAIN: 0
COUGH: 0
PALPITATIONS: 0
NAUSEA: 0
EYE DISCHARGE: 0
SORE THROAT: 0
STRIDOR: 0
NERVOUS/ANXIOUS: 1
EYE REDNESS: 0
EYE PAIN: 0
FOCAL WEAKNESS: 0
LOSS OF CONSCIOUSNESS: 0
SEIZURES: 0
HEMOPTYSIS: 0
BLOOD IN STOOL: 1
MYALGIAS: 0
SHORTNESS OF BREATH: 0
DIARRHEA: 0
FEVER: 0
MEMORY LOSS: 1
ABDOMINAL PAIN: 0
SPUTUM PRODUCTION: 0
BRUISES/BLEEDS EASILY: 0
SPEECH CHANGE: 0
VOMITING: 0

## 2019-09-21 ASSESSMENT — LIFESTYLE VARIABLES
HAVE YOU EVER FELT YOU SHOULD CUT DOWN ON YOUR DRINKING: NO
HAVE PEOPLE ANNOYED YOU BY CRITICIZING YOUR DRINKING: NO
EVER FELT BAD OR GUILTY ABOUT YOUR DRINKING: NO
TOTAL SCORE: 0
HOW MANY TIMES IN THE PAST YEAR HAVE YOU HAD 5 OR MORE DRINKS IN A DAY: 0
DOES PATIENT WANT TO STOP DRINKING: NO
AVERAGE NUMBER OF DAYS PER WEEK YOU HAVE A DRINK CONTAINING ALCOHOL: 0
EVER HAD A DRINK FIRST THING IN THE MORNING TO STEADY YOUR NERVES TO GET RID OF A HANGOVER: NO
TOTAL SCORE: 0
ALCOHOL_USE: NO
TOTAL SCORE: 0
ON A TYPICAL DAY WHEN YOU DRINK ALCOHOL HOW MANY DRINKS DO YOU HAVE: 0
CONSUMPTION TOTAL: NEGATIVE

## 2019-09-21 NOTE — PROGRESS NOTES
Assumed care of patient. Patient appears to be resting comfortably in bed. Bedside report conducted, new RN introduced. Patient able to express self, no needs at this time. Will continue to monitor.

## 2019-09-21 NOTE — CARE PLAN
Problem: Communication  Goal: The ability to communicate needs accurately and effectively will improve  Outcome: PROGRESSING AS EXPECTED     Problem: Safety  Goal: Will remain free from injury  Outcome: PROGRESSING AS EXPECTED  Goal: Will remain free from falls  Outcome: PROGRESSING AS EXPECTED     Problem: Bowel/Gastric:  Goal: Normal bowel function is maintained or improved  Outcome: PROGRESSING SLOWER THAN EXPECTED  Goal: Will not experience complications related to bowel motility  Outcome: PROGRESSING SLOWER THAN EXPECTED

## 2019-09-21 NOTE — PROGRESS NOTES
Gastroenterology Progress Note     Author: Mitchell BANDAR Devora   Date & Time Created: 9/21/2019 9:22 AM    Chief Complaint:  Painless hematochezia  88 year old female with history of dementia, chronic back pain, hypertension, hypothyroidism, diverticulosis, erosive gastroduodenitis and multiple colon polyps status post polypectomy done in May 2019 was brought into the emergency department because of intermittent left-sided abdominal pain and 3 large bloody bowel movements with red blood and some dark clots. Patient was admitted to the hospital in May 2019 because of similar symptoms, EGD and colonoscopy by Dr. Cunningham was done showed multiple polyps, erosive gastroduodenitis and diverticulosis with internal hemorrhoids.   She was started on PPI.     Interval History:  - 9/20/2019: She passed several bloody stools per nurse.  Digital rectal examination revealed maroon colored stool.  Nuclear medicine RBC bleeding scan showed no active bleeding.  - 9/21/2019: No further reported active bleeding.  H/H relatively stable.      Review of Systems:  Review of Systems   Constitutional: Negative for chills and fever.   Respiratory: Negative for shortness of breath.    Cardiovascular: Negative for chest pain.   Gastrointestinal: Negative for abdominal pain and nausea.       Physical Exam:  Physical Exam   Constitutional: No distress.   Abdominal: Soft. Bowel sounds are normal. She exhibits no distension and no mass. There is no tenderness.       Labs:          Recent Labs     09/19/19  1444 09/20/19 0319   SODIUM 141 139   POTASSIUM 4.4 3.6   CHLORIDE 102 104   CO2 30 32   BUN 12 14   CREATININE 0.57 0.45*   CALCIUM 10.0 9.2     Recent Labs     09/19/19  1444 09/20/19 0319   ALTSGPT 9  --    ASTSGOT 21  --    ALKPHOSPHAT 75  --    TBILIRUBIN 0.5  --    GLUCOSE 108* 91     Recent Labs     09/19/19  1444  09/20/19  0319  09/20/19  1720 09/20/19  2137 09/21/19  0357   RBC 4.29  --  3.58*  --   --   --  3.37*   HEMOGLOBIN 11.9*   < >  9.9*  9.9*   < > 10.4* 9.9* 9.6*   HEMATOCRIT 38.9  --  32.3*  --   --   --  31.0*   PLATELETCT 212  --  176  --   --   --  170   PROTHROMBTM 12.6  --   --   --   --   --   --    APTT 25.3  --   --   --   --   --   --    INR 0.92  --   --   --   --   --   --     < > = values in this interval not displayed.     Recent Labs     19  1444 19  0319 19  0357   WBC 7.8 7.5 6.0   NEUTSPOLYS 64.50 63.40 58.90   LYMPHOCYTES 23.60 22.10 26.20   MONOCYTES 9.70 11.70 10.80   EOSINOPHILS 1.70 2.10 3.50   BASOPHILS 0.40 0.40 0.30   ASTSGOT 21  --   --    ALTSGPT 9  --   --    ALKPHOSPHAT 75  --   --    TBILIRUBIN 0.5  --   --      Hemodynamics:  Temp (24hrs), Av.5 °C (97.7 °F), Min:36.2 °C (97.2 °F), Max:36.9 °C (98.5 °F)  Temperature: 36.2 °C (97.2 °F)  Pulse  Av.2  Min: 77  Max: 91   Blood Pressure : 136/77     Respiratory:    Respiration: 17, Pulse Oximetry: 97 %        RUL Breath Sounds: Clear;Diminished, RML Breath Sounds: Clear;Diminished, RLL Breath Sounds: Clear;Diminished, ZAIN Breath Sounds: Clear;Diminished, LLL Breath Sounds: Clear;Diminished  Fluids:    Intake/Output Summary (Last 24 hours) at 2019 0922  Last data filed at 2019 0700  Gross per 24 hour   Intake 720 ml   Output --   Net 720 ml        GI/Nutrition:  Orders Placed This Encounter   Procedures   • Diet Order Clear Liquids - No Red Foods     Standing Status:   Standing     Number of Occurrences:   1     Order Specific Question:   Diet:     Answer:   Clear Liquids - No Red Foods [12]     Medical Decision Making, by Problem:  Active Hospital Problems    Diagnosis   • *GI bleed [K92.2]   • Cognitive impairment [R41.89]   • Debility [R53.81]   • Benign essential HTN [I10]   • Hypothyroid [E03.9]       Impression:  1. Lower GI bleed.  Diverticular bleed suspected.  Bleeding scan negative.  2. Diverticulosis coli.  3. History of colon polyps (3 tubular adenomas 2019).  4. Internal hemorrhoids.  5. Erosive gastroduodenitis,  on PPI therapy.  6. Anemia, mutifactorial (bleeding, chronic disease?)  - Stable.  7. Other issues: hypothyroidism, chronic back pain, hypertension.     Recommendations:  - I think we can safely advance diet slowly.  - She must avoid NSAIDs as combined with ASA result in further platelet dysfunction.  - If stable and no further bleeding, then she may be discharged in next 1-2 days.  - Please call me back for any evidence of active recurrent bleeding.    Signing off.    Quality-Core Measures

## 2019-09-21 NOTE — PROGRESS NOTES
[facility] Medicine Daily Progress Note    Date of Service  9/21/2019    Chief Complaint  88 y.o. female admitted 9/19/2019 with lower gastrointestinal bleeding    Hospital Course      88 years old female with past medical history of colonic polyposis, diverticulosis, admitted 9/19 with lower GI bleeding.  Gastroenterology have been consulted. anoscopy 9/20.  Culprit likely diverticular bleed          Interval Problem Update  Hemodynamically stable overnight.  Requiring 2-4 L of oxygen, encourage incentive spirometer, try to wean off as able.  Alert, variably and intermittently oriented x1-x2  Gastroenterology following, believe the patient has diverticular bleed.  Hemoglobin 10.4, then 9.9, then 9.6, continue to monitor.  We will start diet and observe  Patient is very weak, PT OT when able.  I discussed with patient, patient's nurse and charge nurse.      Consultants/Specialty  Gastroenterology    Code Status  Full code    Disposition  To be determined, pending PT/OT when able    Review of Systems  Review of Systems   Constitutional: Negative for chills and fever.   HENT: Negative for congestion and sore throat.    Eyes: Negative for pain, discharge and redness.   Respiratory: Negative for cough, hemoptysis, sputum production, shortness of breath and stridor.    Cardiovascular: Negative for chest pain, palpitations and leg swelling.   Gastrointestinal: Positive for blood in stool. Negative for abdominal pain, diarrhea and vomiting.   Genitourinary: Negative for flank pain, hematuria and urgency.   Musculoskeletal: Negative for myalgias.   Skin: Negative.    Neurological: Negative for speech change, focal weakness, seizures and loss of consciousness.   Endo/Heme/Allergies: Does not bruise/bleed easily.   Psychiatric/Behavioral: Positive for memory loss. The patient is nervous/anxious.       Physical Exam  Temp:  [36.2 °C (97.2 °F)-36.9 °C (98.5 °F)] 36.2 °C (97.2 °F)  Pulse:  [77-84] 77  Resp:  [16-17] 17  BP:  (130-152)/(63-77) 136/77  SpO2:  [87 %-97 %] 87 %    Physical Exam   Constitutional: No distress.   Elderly, friable   HENT:   Head: Normocephalic and atraumatic.   Right Ear: External ear normal.   Left Ear: External ear normal.   Eyes: Pupils are equal, round, and reactive to light. Conjunctivae are normal. Right eye exhibits no discharge. Left eye exhibits no discharge.   Neck: Normal range of motion. Neck supple. No JVD present. No tracheal deviation present. No thyromegaly present.   Cardiovascular: Exam reveals no gallop and no friction rub.   No murmur heard.  Pulmonary/Chest: Effort normal and breath sounds normal. No stridor. No respiratory distress. She has no wheezes. She has no rales. She exhibits no tenderness.   Abdominal: Soft. Bowel sounds are normal. She exhibits no distension. There is no tenderness. There is no rebound and no guarding.   Musculoskeletal: Normal range of motion. She exhibits no edema, tenderness or deformity.   Neurological: She is alert. No cranial nerve deficit. Coordination normal.   Alert, oriented x1-2.  Self and place.   Skin: Skin is warm and dry. No rash noted. She is not diaphoretic. No erythema. There is pallor.   Psychiatric: She has a normal mood and affect.   Slowed    Nursing note and vitals reviewed.    Fluids    Intake/Output Summary (Last 24 hours) at 9/21/2019 1411  Last data filed at 9/21/2019 1010  Gross per 24 hour   Intake 1200 ml   Output --   Net 1200 ml       Laboratory  Recent Labs     09/19/19  1444  09/20/19  0319  09/20/19  1720 09/20/19  2137 09/21/19  0357   WBC 7.8  --  7.5  --   --   --  6.0   RBC 4.29  --  3.58*  --   --   --  3.37*   HEMOGLOBIN 11.9*   < > 9.9*  9.9*   < > 10.4* 9.9* 9.6*   HEMATOCRIT 38.9  --  32.3*  --   --   --  31.0*   MCV 90.7  --  90.2  --   --   --  92.0   MCH 27.7  --  27.7  --   --   --  28.5   MCHC 30.6*  --  30.7*  --   --   --  31.0*   RDW 56.3*  --  55.9*  --   --   --  56.5*   PLATELETCT 212  --  176  --   --   --   170   MPV 9.7  --  9.1  --   --   --  9.2    < > = values in this interval not displayed.     Recent Labs     09/19/19  1444 09/20/19  0319   SODIUM 141 139   POTASSIUM 4.4 3.6   CHLORIDE 102 104   CO2 30 32   GLUCOSE 108* 91   BUN 12 14   CREATININE 0.57 0.45*   CALCIUM 10.0 9.2     Recent Labs     09/19/19  1444   APTT 25.3   INR 0.92               Imaging  NM-GI BLEEDING SCAN   Final Result      Unremarkable scan for GI bleeding.      CT-ABDOMEN-PELVIS WITH   Final Result      1.  Colonic diverticulosis without evidence for diverticulitis.   2.  No CT evidence for colitis or diverticulitis.      DX-CHEST-PORTABLE (1 VIEW)   Final Result      1.  Mild pulmonary vascular congestion, improved from prior exam.   2.  No lobar pneumonia or overt pulmonary edema.   3.  Double layer calcification of the aortic knob.  Dissection is not excluded.   4.  Prominent central pulmonary vasculature are again seen suggesting pulmonary hypertension.   5.  Stable cardiomegaly.         Assessment/Plan  * GI bleed- (present on admission)  Assessment & Plan  Clear liquids, advance as tolerated  Serial hemoglobin  Gastroenterology consulted anoscopy 9/20.  Culprit likely diverticular bleed    Debility- (present on admission)  Assessment & Plan  Multifactorial, age, gastrointestinal bleeding  Physical and occupational therapy evaluation when able    Cognitive impairment  Assessment & Plan  Unclear baseline.  Likely undiagnosed dementia.    Benign essential HTN- (present on admission)  Assessment & Plan  Continue metoprolol     Hypothyroid- (present on admission)  Assessment & Plan  Continue Synthroid       VTE prophylaxis: SCDs, gastrointestinal bleeding

## 2019-09-21 NOTE — ASSESSMENT & PLAN NOTE
-Multifactorial due to age, worsening cognitive impairment and GI bleed  -Fall precautions  -PT/OT.  Plan was to send home with home health, however daughter declines stating she has resources to care for patient 24/7 per PT recommendations  -Dietary supplements 3 times daily  -Encourage p.o. Intake  -Frequent reorientation  -Avoid benzos, anticholinergics and narcotics  -Bowel protocol  -voiding schedule

## 2019-09-21 NOTE — PROGRESS NOTES
Pt confused this AM, thought she was [place] the bank, back to baseline. Multiple episodes of bloody stool. GI bleed study done this afternoon, tolerated well. Pt remains NPO. Cooperative with care, safety maintained. Bed alarm on.

## 2019-09-22 PROBLEM — E87.6 HYPOKALEMIA: Status: ACTIVE | Noted: 2019-09-22

## 2019-09-22 LAB
ANION GAP SERPL CALC-SCNC: 5 MMOL/L (ref 0–11.9)
BASOPHILS # BLD AUTO: 0.4 % (ref 0–1.8)
BASOPHILS # BLD: 0.02 K/UL (ref 0–0.12)
BUN SERPL-MCNC: 11 MG/DL (ref 8–22)
CALCIUM SERPL-MCNC: 9.3 MG/DL (ref 8.5–10.5)
CHLORIDE SERPL-SCNC: 103 MMOL/L (ref 96–112)
CO2 SERPL-SCNC: 32 MMOL/L (ref 20–33)
CREAT SERPL-MCNC: 0.35 MG/DL (ref 0.5–1.4)
EOSINOPHIL # BLD AUTO: 0.22 K/UL (ref 0–0.51)
EOSINOPHIL NFR BLD: 3.9 % (ref 0–6.9)
ERYTHROCYTE [DISTWIDTH] IN BLOOD BY AUTOMATED COUNT: 54.9 FL (ref 35.9–50)
GLUCOSE SERPL-MCNC: 90 MG/DL (ref 65–99)
HCT VFR BLD AUTO: 28.4 % (ref 37–47)
HGB BLD-MCNC: 8.9 G/DL (ref 12–16)
IMM GRANULOCYTES # BLD AUTO: 0.02 K/UL (ref 0–0.11)
IMM GRANULOCYTES NFR BLD AUTO: 0.4 % (ref 0–0.9)
LYMPHOCYTES # BLD AUTO: 1.71 K/UL (ref 1–4.8)
LYMPHOCYTES NFR BLD: 30.6 % (ref 22–41)
MCH RBC QN AUTO: 28.3 PG (ref 27–33)
MCHC RBC AUTO-ENTMCNC: 31.3 G/DL (ref 33.6–35)
MCV RBC AUTO: 90.4 FL (ref 81.4–97.8)
MONOCYTES # BLD AUTO: 0.66 K/UL (ref 0–0.85)
MONOCYTES NFR BLD AUTO: 11.8 % (ref 0–13.4)
NEUTROPHILS # BLD AUTO: 2.95 K/UL (ref 2–7.15)
NEUTROPHILS NFR BLD: 52.9 % (ref 44–72)
NRBC # BLD AUTO: 0 K/UL
NRBC BLD-RTO: 0 /100 WBC
PLATELET # BLD AUTO: 173 K/UL (ref 164–446)
PMV BLD AUTO: 9.7 FL (ref 9–12.9)
POTASSIUM SERPL-SCNC: 3.5 MMOL/L (ref 3.6–5.5)
RBC # BLD AUTO: 3.14 M/UL (ref 4.2–5.4)
SODIUM SERPL-SCNC: 140 MMOL/L (ref 135–145)
WBC # BLD AUTO: 5.6 K/UL (ref 4.8–10.8)

## 2019-09-22 PROCEDURE — 307059 PAD,EAR PROTECTOR: Performed by: HOSPITALIST

## 2019-09-22 PROCEDURE — A9270 NON-COVERED ITEM OR SERVICE: HCPCS | Performed by: HOSPITALIST

## 2019-09-22 PROCEDURE — 85025 COMPLETE CBC W/AUTO DIFF WBC: CPT

## 2019-09-22 PROCEDURE — 700102 HCHG RX REV CODE 250 W/ 637 OVERRIDE(OP): Performed by: HOSPITALIST

## 2019-09-22 PROCEDURE — A9270 NON-COVERED ITEM OR SERVICE: HCPCS | Performed by: FAMILY MEDICINE

## 2019-09-22 PROCEDURE — 770006 HCHG ROOM/CARE - MED/SURG/GYN SEMI*

## 2019-09-22 PROCEDURE — 99232 SBSQ HOSP IP/OBS MODERATE 35: CPT | Performed by: HOSPITALIST

## 2019-09-22 PROCEDURE — 80048 BASIC METABOLIC PNL TOTAL CA: CPT

## 2019-09-22 PROCEDURE — 36415 COLL VENOUS BLD VENIPUNCTURE: CPT

## 2019-09-22 PROCEDURE — 700102 HCHG RX REV CODE 250 W/ 637 OVERRIDE(OP): Performed by: FAMILY MEDICINE

## 2019-09-22 RX ORDER — POTASSIUM CHLORIDE 20 MEQ/1
40 TABLET, EXTENDED RELEASE ORAL ONCE
Status: COMPLETED | OUTPATIENT
Start: 2019-09-22 | End: 2019-09-22

## 2019-09-22 RX ADMIN — LEVOTHYROXINE SODIUM 112 MCG: 112 TABLET ORAL at 06:00

## 2019-09-22 RX ADMIN — METOPROLOL TARTRATE 25 MG: 25 TABLET, FILM COATED ORAL at 06:00

## 2019-09-22 RX ADMIN — LATANOPROST 1 DROP: 50 SOLUTION OPHTHALMIC at 17:15

## 2019-09-22 RX ADMIN — POTASSIUM CHLORIDE 40 MEQ: 1500 TABLET, EXTENDED RELEASE ORAL at 08:19

## 2019-09-22 RX ADMIN — POLYVINYL ALCOHOL 1 DROP: 14 SOLUTION/ DROPS OPHTHALMIC at 05:47

## 2019-09-22 RX ADMIN — METOPROLOL TARTRATE 25 MG: 25 TABLET, FILM COATED ORAL at 18:00

## 2019-09-22 ASSESSMENT — ENCOUNTER SYMPTOMS
SPUTUM PRODUCTION: 0
EYE PAIN: 0
FEVER: 0
COUGH: 0
HEMOPTYSIS: 0
SEIZURES: 0
NERVOUS/ANXIOUS: 1
PALPITATIONS: 0
MYALGIAS: 0
DIARRHEA: 0
SPEECH CHANGE: 0
ABDOMINAL PAIN: 0
EYE REDNESS: 0
EYE DISCHARGE: 0
CHILLS: 0
FLANK PAIN: 0
FOCAL WEAKNESS: 0
SORE THROAT: 0
MEMORY LOSS: 1
SHORTNESS OF BREATH: 0
LOSS OF CONSCIOUSNESS: 0
BLOOD IN STOOL: 1
VOMITING: 0
STRIDOR: 0
BRUISES/BLEEDS EASILY: 0

## 2019-09-22 NOTE — PROGRESS NOTES
2 RN skin check complete with CARRINGTON Payne.   Devices in place: SCD's, PIV, pure wick.  Skin assessed under devices: skin intact.   Confirmed pressure ulcers found on: N/A  New potential pressure ulcers noted on N/A. Wound consult place: NA  The following interventions in place: [id number], incontinence care provided with linen changes, moisturizer to coccyx and buttocks.     Bilateral elbows and ears pink and blanching.  Buttocks and coccyx pink and blanching,   Bilateral arms fragile and some bruises, otherwise skin intact.

## 2019-09-22 NOTE — CARE PLAN
Problem: Safety  Goal: Will remain free from injury  Outcome: PROGRESSING AS EXPECTED     Problem: Venous Thromboembolism (VTW)/Deep Vein Thrombosis (DVT) Prevention:  Goal: Patient will participate in Venous Thrombosis (VTE)/Deep Vein Thrombosis (DVT)Prevention Measures  Outcome: PROGRESSING AS EXPECTED

## 2019-09-22 NOTE — PROGRESS NOTES
[facility] Medicine Daily Progress Note    Date of Service  9/22/2019    Chief Complaint  88 y.o. female admitted 9/19/2019 with lower gastrointestinal bleeding    Hospital Course      88 years old female with past medical history of colonic polyposis, diverticulosis, admitted 9/19 with lower GI bleeding.  Gastroenterology have been consulted. anoscopy 9/20.  Culprit likely diverticular bleed          Interval Problem Update  Patient still having bloody stools, likely diverticular bleed.  Hypokalemia, I am replacing, check magnesium  Continue to trend hemoglobin has been dropping 9.9, down to 9.6, down to 8.9.  Transfuse for hemoglobin less than 7.  Alert, variably and intermittently oriented x1-x3  Patient is very weak, PT OT , will likely need SNF   I discussed with patient, patient's nurse and charge nurse.      Consultants/Specialty  Gastroenterology    Code Status  Full code    Disposition  To be determined, pending PT/OT, will likely need SNF    Review of Systems  Review of Systems   Constitutional: Positive for malaise/fatigue. Negative for chills and fever.   HENT: Negative for congestion and sore throat.    Eyes: Negative for pain, discharge and redness.   Respiratory: Negative for cough, hemoptysis, sputum production, shortness of breath and stridor.    Cardiovascular: Negative for chest pain, palpitations and leg swelling.   Gastrointestinal: Positive for blood in stool. Negative for abdominal pain, diarrhea and vomiting.   Genitourinary: Negative for flank pain, hematuria and urgency.   Musculoskeletal: Negative for myalgias.   Skin: Negative.    Neurological: Negative for speech change, focal weakness, seizures and loss of consciousness.   Endo/Heme/Allergies: Does not bruise/bleed easily.   Psychiatric/Behavioral: Positive for memory loss. The patient is nervous/anxious.       Physical Exam  Temp:  [36.2 °C (97.1 °F)-37 °C (98.6 °F)] 36.2 °C (97.1 °F)  Pulse:  [73-86] 77  Resp:  [16-18] 18  BP:  (133-153)/(64-78) 133/78  SpO2:  [90 %-98 %] 98 %    Physical Exam   Constitutional: No distress.   Elderly, friable   HENT:   Head: Normocephalic and atraumatic.   Right Ear: External ear normal.   Left Ear: External ear normal.   Eyes: Pupils are equal, round, and reactive to light. Conjunctivae are normal. Right eye exhibits no discharge. Left eye exhibits no discharge.   Neck: Normal range of motion. Neck supple. No JVD present. No tracheal deviation present. No thyromegaly present.   Cardiovascular: Exam reveals no gallop and no friction rub.   No murmur heard.  Pulmonary/Chest: Effort normal and breath sounds normal. No stridor. No respiratory distress. She has no wheezes. She has no rales. She exhibits no tenderness.   Abdominal: Soft. Bowel sounds are normal. She exhibits no distension. There is no tenderness. There is no rebound and no guarding.   Musculoskeletal: Normal range of motion. She exhibits no edema, tenderness or deformity.   Neurological: She is alert. No cranial nerve deficit. Coordination normal.   Alert, oriented x1-3.  Self, place, time.   Skin: Skin is warm and dry. No rash noted. She is not diaphoretic. No erythema. There is pallor.   Psychiatric: She has a normal mood and affect.   Slowed    Nursing note and vitals reviewed.    Fluids    Intake/Output Summary (Last 24 hours) at 9/22/2019 1343  Last data filed at 9/22/2019 0828  Gross per 24 hour   Intake 1080 ml   Output 1000 ml   Net 80 ml       Laboratory  Recent Labs     09/20/19  0319  09/20/19  2137 09/21/19  0357 09/22/19  0350   WBC 7.5  --   --  6.0 5.6   RBC 3.58*  --   --  3.37* 3.14*   HEMOGLOBIN 9.9*  9.9*   < > 9.9* 9.6* 8.9*   HEMATOCRIT 32.3*  --   --  31.0* 28.4*   MCV 90.2  --   --  92.0 90.4   MCH 27.7  --   --  28.5 28.3   MCHC 30.7*  --   --  31.0* 31.3*   RDW 55.9*  --   --  56.5* 54.9*   PLATELETCT 176  --   --  170 173   MPV 9.1  --   --  9.2 9.7    < > = values in this interval not displayed.     Recent Labs      09/20/19  0319 09/21/19  0357 09/22/19  0350   SODIUM 139 142 140   POTASSIUM 3.6 4.1 3.5*   CHLORIDE 104 103 103   CO2 32 21 32   GLUCOSE 91 85 90   BUN 14 14 11   CREATININE 0.45* 0.35* 0.35*   CALCIUM 9.2 9.2 9.3     Recent Labs     09/19/19  1444   APTT 25.3   INR 0.92               Imaging  NM-GI BLEEDING SCAN   Final Result      Unremarkable scan for GI bleeding.      CT-ABDOMEN-PELVIS WITH   Final Result      1.  Colonic diverticulosis without evidence for diverticulitis.   2.  No CT evidence for colitis or diverticulitis.      DX-CHEST-PORTABLE (1 VIEW)   Final Result      1.  Mild pulmonary vascular congestion, improved from prior exam.   2.  No lobar pneumonia or overt pulmonary edema.   3.  Double layer calcification of the aortic knob.  Dissection is not excluded.   4.  Prominent central pulmonary vasculature are again seen suggesting pulmonary hypertension.   5.  Stable cardiomegaly.         Assessment/Plan  * GI bleed- (present on admission)  Assessment & Plan  Clear liquids, advance as tolerated  Serial hemoglobin  Gastroenterology consulted anoscopy 9/20.  Culprit likely diverticular bleed  Continue to monitor H&H, transfuse for hemoglobin of less than 7    Hypokalemia- (present on admission)  Assessment & Plan  Replacing, checking magnesium  Continue to monitor replace as needed     Debility- (present on admission)  Assessment & Plan  Multifactorial, age, gastrointestinal bleeding  Physical and occupational therapy evaluation when able    Cognitive impairment  Assessment & Plan  Unclear baseline.  Likely undiagnosed dementia.    Benign essential HTN- (present on admission)  Assessment & Plan  Continue metoprolol     Hypothyroid- (present on admission)  Assessment & Plan  Continue Synthroid       VTE prophylaxis: SCDs, gastrointestinal bleeding

## 2019-09-22 NOTE — PROGRESS NOTES
Report received by dayshift RN. Assumed care of pt. Assessment complete. Pt A&Ox4, VSS, currently on 2L o2. Pt in no apparent signs of distress. Plan of care discussed. Call light within reach, bed in lowest position, and pt has no further questions at this time.

## 2019-09-23 LAB
ANION GAP SERPL CALC-SCNC: 6 MMOL/L (ref 0–11.9)
BASOPHILS # BLD AUTO: 0.4 % (ref 0–1.8)
BASOPHILS # BLD: 0.02 K/UL (ref 0–0.12)
BUN SERPL-MCNC: 9 MG/DL (ref 8–22)
CALCIUM SERPL-MCNC: 9.4 MG/DL (ref 8.5–10.5)
CHLORIDE SERPL-SCNC: 103 MMOL/L (ref 96–112)
CO2 SERPL-SCNC: 32 MMOL/L (ref 20–33)
CREAT SERPL-MCNC: 0.37 MG/DL (ref 0.5–1.4)
EOSINOPHIL # BLD AUTO: 0.18 K/UL (ref 0–0.51)
EOSINOPHIL NFR BLD: 3.2 % (ref 0–6.9)
ERYTHROCYTE [DISTWIDTH] IN BLOOD BY AUTOMATED COUNT: 55.5 FL (ref 35.9–50)
GLUCOSE SERPL-MCNC: 87 MG/DL (ref 65–99)
HCT VFR BLD AUTO: 27 % (ref 37–47)
HGB BLD-MCNC: 8.3 G/DL (ref 12–16)
IMM GRANULOCYTES # BLD AUTO: 0.01 K/UL (ref 0–0.11)
IMM GRANULOCYTES NFR BLD AUTO: 0.2 % (ref 0–0.9)
LYMPHOCYTES # BLD AUTO: 1.79 K/UL (ref 1–4.8)
LYMPHOCYTES NFR BLD: 31.9 % (ref 22–41)
MAGNESIUM SERPL-MCNC: 1.6 MG/DL (ref 1.5–2.5)
MCH RBC QN AUTO: 27.8 PG (ref 27–33)
MCHC RBC AUTO-ENTMCNC: 30.7 G/DL (ref 33.6–35)
MCV RBC AUTO: 90.3 FL (ref 81.4–97.8)
MONOCYTES # BLD AUTO: 0.7 K/UL (ref 0–0.85)
MONOCYTES NFR BLD AUTO: 12.5 % (ref 0–13.4)
NEUTROPHILS # BLD AUTO: 2.92 K/UL (ref 2–7.15)
NEUTROPHILS NFR BLD: 51.8 % (ref 44–72)
NRBC # BLD AUTO: 0 K/UL
NRBC BLD-RTO: 0 /100 WBC
PLATELET # BLD AUTO: 187 K/UL (ref 164–446)
PMV BLD AUTO: 10 FL (ref 9–12.9)
POTASSIUM SERPL-SCNC: 3.8 MMOL/L (ref 3.6–5.5)
RBC # BLD AUTO: 2.99 M/UL (ref 4.2–5.4)
SODIUM SERPL-SCNC: 141 MMOL/L (ref 135–145)
WBC # BLD AUTO: 5.6 K/UL (ref 4.8–10.8)

## 2019-09-23 PROCEDURE — 99232 SBSQ HOSP IP/OBS MODERATE 35: CPT | Performed by: HOSPITALIST

## 2019-09-23 PROCEDURE — 770006 HCHG ROOM/CARE - MED/SURG/GYN SEMI*

## 2019-09-23 PROCEDURE — 83735 ASSAY OF MAGNESIUM: CPT

## 2019-09-23 PROCEDURE — 97162 PT EVAL MOD COMPLEX 30 MIN: CPT

## 2019-09-23 PROCEDURE — 700102 HCHG RX REV CODE 250 W/ 637 OVERRIDE(OP): Performed by: FAMILY MEDICINE

## 2019-09-23 PROCEDURE — 85025 COMPLETE CBC W/AUTO DIFF WBC: CPT

## 2019-09-23 PROCEDURE — 97166 OT EVAL MOD COMPLEX 45 MIN: CPT

## 2019-09-23 PROCEDURE — 80048 BASIC METABOLIC PNL TOTAL CA: CPT

## 2019-09-23 PROCEDURE — 36415 COLL VENOUS BLD VENIPUNCTURE: CPT

## 2019-09-23 PROCEDURE — A9270 NON-COVERED ITEM OR SERVICE: HCPCS | Performed by: FAMILY MEDICINE

## 2019-09-23 RX ADMIN — POLYVINYL ALCOHOL 1 DROP: 14 SOLUTION/ DROPS OPHTHALMIC at 05:30

## 2019-09-23 RX ADMIN — METOPROLOL TARTRATE 25 MG: 25 TABLET, FILM COATED ORAL at 17:19

## 2019-09-23 RX ADMIN — LEVOTHYROXINE SODIUM 112 MCG: 112 TABLET ORAL at 05:30

## 2019-09-23 RX ADMIN — LATANOPROST 1 DROP: 50 SOLUTION OPHTHALMIC at 17:20

## 2019-09-23 RX ADMIN — METOPROLOL TARTRATE 25 MG: 25 TABLET, FILM COATED ORAL at 05:30

## 2019-09-23 ASSESSMENT — ENCOUNTER SYMPTOMS
LOSS OF CONSCIOUSNESS: 0
BRUISES/BLEEDS EASILY: 0
FEVER: 0
DIARRHEA: 0
BLOOD IN STOOL: 1
COUGH: 0
FLANK PAIN: 0
SEIZURES: 0
HEMOPTYSIS: 0
NERVOUS/ANXIOUS: 1
FOCAL WEAKNESS: 0
SPEECH CHANGE: 0
PALPITATIONS: 0
SORE THROAT: 0
MEMORY LOSS: 1
EYE DISCHARGE: 0
CHILLS: 0
VOMITING: 0
SHORTNESS OF BREATH: 0
MYALGIAS: 0
ABDOMINAL PAIN: 0
STRIDOR: 0
EYE REDNESS: 0
SPUTUM PRODUCTION: 0
EYE PAIN: 0

## 2019-09-23 ASSESSMENT — GAIT ASSESSMENTS
ASSISTIVE DEVICE: FRONT WHEEL WALKER
DEVIATION: BRADYKINETIC;SHUFFLED GAIT
DISTANCE (FEET): 15
GAIT LEVEL OF ASSIST: CONTACT GUARD ASSIST

## 2019-09-23 ASSESSMENT — COGNITIVE AND FUNCTIONAL STATUS - GENERAL
DRESSING REGULAR LOWER BODY CLOTHING: A LOT
DRESSING REGULAR UPPER BODY CLOTHING: A LITTLE
SUGGESTED CMS G CODE MODIFIER DAILY ACTIVITY: CK
CLIMB 3 TO 5 STEPS WITH RAILING: A LOT
MOBILITY SCORE: 17
PERSONAL GROOMING: A LITTLE
TURNING FROM BACK TO SIDE WHILE IN FLAT BAD: A LITTLE
STANDING UP FROM CHAIR USING ARMS: A LITTLE
WALKING IN HOSPITAL ROOM: A LITTLE
DAILY ACTIVITIY SCORE: 17
SUGGESTED CMS G CODE MODIFIER MOBILITY: CK
MOVING FROM LYING ON BACK TO SITTING ON SIDE OF FLAT BED: A LITTLE
HELP NEEDED FOR BATHING: A LOT
MOVING TO AND FROM BED TO CHAIR: A LITTLE
TOILETING: A LITTLE

## 2019-09-23 ASSESSMENT — ACTIVITIES OF DAILY LIVING (ADL): TOILETING: UNABLE TO DETERMINE AT THIS TIME

## 2019-09-23 NOTE — PROGRESS NOTES
[facility] Medicine Daily Progress Note    Date of Service  9/23/2019    Chief Complaint  88 y.o. female admitted 9/19/2019 with lower gastrointestinal bleeding    Hospital Course      88 years old female with past medical history of colonic polyposis, diverticulosis, admitted 9/19 with lower GI bleeding.  Gastroenterology have been consulted. anoscopy 9/20.  Culprit likely diverticular bleed          Interval Problem Update  This heart rate 70s-80s, blood pressure within normal limits this morning.  Patient is passing a few clots, ? old blood, continue to morning.    Hb 8.3,    Hypokalemia improved, K 3.8   Transfuse for hemoglobin less than 7.  Alert, variably and intermittently oriented x1-x3  Patient is very weak, PT OT , will likely need SNF   Discussed with patient, patient's nurse and with multidisciplinary team during rounds including , and charge nurse.      Consultants/Specialty  Gastroenterology    Code Status  Full code    Disposition  To be determined, pending PT/OT, will likely need SNF     Review of Systems  Review of Systems   Constitutional: Positive for malaise/fatigue. Negative for chills and fever.   HENT: Negative for congestion and sore throat.    Eyes: Negative for pain, discharge and redness.   Respiratory: Negative for cough, hemoptysis, sputum production, shortness of breath and stridor.    Cardiovascular: Negative for chest pain, palpitations and leg swelling.   Gastrointestinal: Positive for blood in stool. Negative for abdominal pain, diarrhea and vomiting.        Passing clots today.   Genitourinary: Negative for flank pain, hematuria and urgency.   Musculoskeletal: Negative for myalgias.   Skin: Negative.    Neurological: Negative for speech change, focal weakness, seizures and loss of consciousness.   Endo/Heme/Allergies: Does not bruise/bleed easily.   Psychiatric/Behavioral: Positive for memory loss. The patient is nervous/anxious.       Physical Exam  Temp:  [36.2  °C (97.1 °F)-36.7 °C (98 °F)] 36.2 °C (97.1 °F)  Pulse:  [76-89] 76  Resp:  [16-17] 17  BP: (110-158)/(74-75) 158/74  SpO2:  [93 %-98 %] 98 %    Physical Exam   Constitutional: No distress.   Elderly, friable   HENT:   Head: Normocephalic and atraumatic.   Right Ear: External ear normal.   Left Ear: External ear normal.   Eyes: Pupils are equal, round, and reactive to light. Conjunctivae are normal. Right eye exhibits no discharge. Left eye exhibits no discharge.   Neck: Normal range of motion. Neck supple. No JVD present. No tracheal deviation present. No thyromegaly present.   Cardiovascular: Exam reveals no gallop and no friction rub.   No murmur heard.  Pulmonary/Chest: Effort normal and breath sounds normal. No stridor. No respiratory distress. She has no wheezes. She has no rales. She exhibits no tenderness.   Abdominal: Soft. Bowel sounds are normal. She exhibits no distension. There is no tenderness. There is no rebound and no guarding.   Musculoskeletal: Normal range of motion. She exhibits no edema, tenderness or deformity.   Neurological: She is alert. No cranial nerve deficit. Coordination normal.   Alert, oriented x1-3.  Self, place, time.   Skin: Skin is warm and dry. No rash noted. She is not diaphoretic. No erythema. There is pallor.   Psychiatric: She has a normal mood and affect.   Slowed    Nursing note and vitals reviewed.    Fluids    Intake/Output Summary (Last 24 hours) at 9/23/2019 1536  Last data filed at 9/23/2019 1000  Gross per 24 hour   Intake 360 ml   Output 700 ml   Net -340 ml       Laboratory  Recent Labs     09/21/19  0357 09/22/19  0350 09/23/19  0246   WBC 6.0 5.6 5.6   RBC 3.37* 3.14* 2.99*   HEMOGLOBIN 9.6* 8.9* 8.3*   HEMATOCRIT 31.0* 28.4* 27.0*   MCV 92.0 90.4 90.3   MCH 28.5 28.3 27.8   MCHC 31.0* 31.3* 30.7*   RDW 56.5* 54.9* 55.5*   PLATELETCT 170 173 187   MPV 9.2 9.7 10.0     Recent Labs     09/21/19  0357 09/22/19  0350 09/23/19  0246   SODIUM 142 140 141   POTASSIUM  4.1 3.5* 3.8   CHLORIDE 103 103 103   CO2 21 32 32   GLUCOSE 85 90 87   BUN 14 11 9   CREATININE 0.35* 0.35* 0.37*   CALCIUM 9.2 9.3 9.4                   Imaging  NM-GI BLEEDING SCAN   Final Result      Unremarkable scan for GI bleeding.      CT-ABDOMEN-PELVIS WITH   Final Result      1.  Colonic diverticulosis without evidence for diverticulitis.   2.  No CT evidence for colitis or diverticulitis.      DX-CHEST-PORTABLE (1 VIEW)   Final Result      1.  Mild pulmonary vascular congestion, improved from prior exam.   2.  No lobar pneumonia or overt pulmonary edema.   3.  Double layer calcification of the aortic knob.  Dissection is not excluded.   4.  Prominent central pulmonary vasculature are again seen suggesting pulmonary hypertension.   5.  Stable cardiomegaly.         Assessment/Plan  * GI bleed- (present on admission)  Assessment & Plan  Clear liquids, advance as tolerated  Serial hemoglobin  Gastroenterology consulted anoscopy 9/20.  Culprit likely diverticular bleed  Continue to monitor H&H, transfuse for hemoglobin of less than 7      Hypokalemia- (present on admission)  Assessment & Plan  Replacing, checking magnesium  Continue to monitor replace as needed     Debility- (present on admission)  Assessment & Plan  Multifactorial, age, gastrointestinal bleeding  Physical and occupational therapy evaluation when able    Cognitive impairment  Assessment & Plan  Unclear baseline.  Likely undiagnosed dementia.    Benign essential HTN- (present on admission)  Assessment & Plan  Continue metoprolol     Hypothyroid- (present on admission)  Assessment & Plan  Continue Synthroid       VTE prophylaxis: SCDs, gastrointestinal bleeding

## 2019-09-23 NOTE — PROGRESS NOTES
Bedside report received, pt care assumed. Pure wick still in place for incontinence. Pt had 1 medium, loose, maroon colored stool. Pt denies any pain or discomfort. Bed in lowest position, bed alarm on, pt educated on fall risk and verbalized understanding, call light within reach. Will continue to monitor.

## 2019-09-23 NOTE — PROGRESS NOTES
2 RN skin check complete with CARRINGTON Welch.   Devices in place: PIV, pure wick.  Skin assessed under devices: skin intact.   Confirmed pressure ulcers found on: N/A  New potential pressure ulcers noted on N/A. Wound consult place: NA  The following interventions in place: [id number], pillows for repositioning/comfort, barrier cream, and incontinence care provided with linen changes.     Bilateral elbows and ears pink and blanching. Foam applied to ears.  Buttocks and coccyx pink and blanching.  Perineum red and blanching.  Trace edema noted to bilateral ankles.  Bilateral arms fragile with some bruises, otherwise skin intact.

## 2019-09-23 NOTE — CARE PLAN
Problem: Safety  Goal: Will remain free from injury  Outcome: PROGRESSING AS EXPECTED     Problem: Skin Integrity  Goal: Risk for impaired skin integrity will decrease  Outcome: PROGRESSING AS EXPECTED

## 2019-09-23 NOTE — CARE PLAN
Problem: Communication  Goal: The ability to communicate needs accurately and effectively will improve  Outcome: PROGRESSING AS EXPECTED     Problem: Safety  Goal: Will remain free from injury  Outcome: PROGRESSING AS EXPECTED  Goal: Will remain free from falls  Outcome: PROGRESSING AS EXPECTED     Problem: Skin Integrity  Goal: Risk for impaired skin integrity will decrease  Outcome: PROGRESSING AS EXPECTED     Problem: Urinary Elimination:  Goal: Ability to reestablish a normal urinary elimination pattern will improve  Outcome: PROGRESSING SLOWER THAN EXPECTED

## 2019-09-24 LAB
ANION GAP SERPL CALC-SCNC: 6 MMOL/L (ref 0–11.9)
BASOPHILS # BLD AUTO: 0.7 % (ref 0–1.8)
BASOPHILS # BLD: 0.04 K/UL (ref 0–0.12)
BUN SERPL-MCNC: 8 MG/DL (ref 8–22)
CALCIUM SERPL-MCNC: 9.2 MG/DL (ref 8.5–10.5)
CHLORIDE SERPL-SCNC: 101 MMOL/L (ref 96–112)
CO2 SERPL-SCNC: 32 MMOL/L (ref 20–33)
CREAT SERPL-MCNC: 0.39 MG/DL (ref 0.5–1.4)
EOSINOPHIL # BLD AUTO: 0.14 K/UL (ref 0–0.51)
EOSINOPHIL NFR BLD: 2.6 % (ref 0–6.9)
ERYTHROCYTE [DISTWIDTH] IN BLOOD BY AUTOMATED COUNT: 56.7 FL (ref 35.9–50)
GLUCOSE SERPL-MCNC: 86 MG/DL (ref 65–99)
HCT VFR BLD AUTO: 26.2 % (ref 37–47)
HGB BLD-MCNC: 7.9 G/DL (ref 12–16)
IMM GRANULOCYTES # BLD AUTO: 0.01 K/UL (ref 0–0.11)
IMM GRANULOCYTES NFR BLD AUTO: 0.2 % (ref 0–0.9)
LYMPHOCYTES # BLD AUTO: 1.68 K/UL (ref 1–4.8)
LYMPHOCYTES NFR BLD: 31.1 % (ref 22–41)
MCH RBC QN AUTO: 27.5 PG (ref 27–33)
MCHC RBC AUTO-ENTMCNC: 30.2 G/DL (ref 33.6–35)
MCV RBC AUTO: 91.3 FL (ref 81.4–97.8)
MONOCYTES # BLD AUTO: 0.72 K/UL (ref 0–0.85)
MONOCYTES NFR BLD AUTO: 13.3 % (ref 0–13.4)
NEUTROPHILS # BLD AUTO: 2.82 K/UL (ref 2–7.15)
NEUTROPHILS NFR BLD: 52.1 % (ref 44–72)
NRBC # BLD AUTO: 0 K/UL
NRBC BLD-RTO: 0 /100 WBC
PLATELET # BLD AUTO: 182 K/UL (ref 164–446)
PMV BLD AUTO: 9.8 FL (ref 9–12.9)
POTASSIUM SERPL-SCNC: 3.7 MMOL/L (ref 3.6–5.5)
RBC # BLD AUTO: 2.87 M/UL (ref 4.2–5.4)
SODIUM SERPL-SCNC: 139 MMOL/L (ref 135–145)
WBC # BLD AUTO: 5.4 K/UL (ref 4.8–10.8)

## 2019-09-24 PROCEDURE — 770006 HCHG ROOM/CARE - MED/SURG/GYN SEMI*

## 2019-09-24 PROCEDURE — 700102 HCHG RX REV CODE 250 W/ 637 OVERRIDE(OP): Performed by: FAMILY MEDICINE

## 2019-09-24 PROCEDURE — A9270 NON-COVERED ITEM OR SERVICE: HCPCS | Performed by: FAMILY MEDICINE

## 2019-09-24 PROCEDURE — 36415 COLL VENOUS BLD VENIPUNCTURE: CPT

## 2019-09-24 PROCEDURE — 99232 SBSQ HOSP IP/OBS MODERATE 35: CPT | Performed by: HOSPITALIST

## 2019-09-24 PROCEDURE — 85025 COMPLETE CBC W/AUTO DIFF WBC: CPT

## 2019-09-24 PROCEDURE — 80048 BASIC METABOLIC PNL TOTAL CA: CPT

## 2019-09-24 RX ADMIN — METOPROLOL TARTRATE 25 MG: 25 TABLET, FILM COATED ORAL at 21:45

## 2019-09-24 RX ADMIN — METOPROLOL TARTRATE 25 MG: 25 TABLET, FILM COATED ORAL at 04:05

## 2019-09-24 RX ADMIN — LEVOTHYROXINE SODIUM 112 MCG: 112 TABLET ORAL at 04:05

## 2019-09-24 RX ADMIN — LATANOPROST 1 DROP: 50 SOLUTION OPHTHALMIC at 21:45

## 2019-09-24 RX ADMIN — POLYVINYL ALCOHOL 1 DROP: 14 SOLUTION/ DROPS OPHTHALMIC at 04:05

## 2019-09-24 RX ADMIN — ACETAMINOPHEN 650 MG: 325 TABLET, FILM COATED ORAL at 15:47

## 2019-09-24 ASSESSMENT — ENCOUNTER SYMPTOMS
ABDOMINAL PAIN: 0
FOCAL WEAKNESS: 0
SPEECH CHANGE: 0
MEMORY LOSS: 1
SEIZURES: 0
VOMITING: 0
EYE PAIN: 0
LOSS OF CONSCIOUSNESS: 0
BRUISES/BLEEDS EASILY: 0
FEVER: 0
MYALGIAS: 0
CHILLS: 0
NERVOUS/ANXIOUS: 1
EYE DISCHARGE: 0
EYE REDNESS: 0
PALPITATIONS: 0
SHORTNESS OF BREATH: 0
FLANK PAIN: 0
HEMOPTYSIS: 0
BLOOD IN STOOL: 1
SORE THROAT: 0
DIARRHEA: 0
STRIDOR: 0
SPUTUM PRODUCTION: 0
COUGH: 0

## 2019-09-24 NOTE — PROGRESS NOTES
Hospital Medicine Daily Progress Note    Date of Service  9/24/2019    Chief Complaint  88 y.o. female admitted 9/19/2019 with lower gastrointestinal bleeding    Hospital Course      88 years old female with past medical history of colonic polyposis, diverticulosis, admitted 9/19 with lower GI bleeding.  Gastroenterology have been consulted. anoscopy 9/20.  Culprit likely diverticular bleed          Interval Problem Update  This heart rate 70s-80s, blood pressure within normal limits this morning.  Patient is passing a few clots, ? old blood, continue to morning.    Hb 8.3,    Hypokalemia improved, K 3.8   Transfuse for hemoglobin less than 7.  Alert, variably and intermittently oriented x1-x3  Patient is very weak, PT OT , will likely need SNF   Discussed with patient, patient's nurse and with multidisciplinary team during rounds including , and charge nurse.        9/24: Been seen and examined this morning she is very hard of hearing she sitting at bedside.  Denies any acute complaints.  She has had a few bowel movements that have been bloody since last night as reported by nursing.  However her hemoglobin is stable 7.9  Consultants/Specialty  Gastroenterology    Code Status  Full code    Disposition  To be determined, pending PT/OT, will likely need SNF     Review of Systems  Review of Systems   Constitutional: Positive for malaise/fatigue. Negative for chills and fever.   HENT: Negative for congestion and sore throat.    Eyes: Negative for pain, discharge and redness.   Respiratory: Negative for cough, hemoptysis, sputum production, shortness of breath and stridor.    Cardiovascular: Negative for chest pain, palpitations and leg swelling.   Gastrointestinal: Positive for blood in stool. Negative for abdominal pain, diarrhea and vomiting.        Passing clots today.   Genitourinary: Negative for flank pain, hematuria and urgency.   Musculoskeletal: Negative for myalgias.   Neurological:  Negative for speech change, focal weakness, seizures and loss of consciousness.   Endo/Heme/Allergies: Does not bruise/bleed easily.   Psychiatric/Behavioral: Positive for memory loss. The patient is nervous/anxious.       Physical Exam  Temp:  [35.9 °C (96.7 °F)-37.1 °C (98.8 °F)] 37.1 °C (98.8 °F)  Pulse:  [77-93] 77  Resp:  [17-22] 22  BP: (121-149)/(55-74) 145/74  SpO2:  [92 %-97 %] 92 %    Physical Exam   Constitutional: No distress.   Elderly, friable   HENT:   Head: Normocephalic and atraumatic.   Right Ear: External ear normal.   Left Ear: External ear normal.   Eyes: Pupils are equal, round, and reactive to light. Conjunctivae are normal. Right eye exhibits no discharge. Left eye exhibits no discharge.   Neck: Normal range of motion. Neck supple. No JVD present. No tracheal deviation present. No thyromegaly present.   Cardiovascular: Exam reveals no gallop and no friction rub.   No murmur heard.  Pulmonary/Chest: Effort normal and breath sounds normal. No stridor. No respiratory distress. She has no wheezes. She has no rales. She exhibits no tenderness.   Abdominal: Soft. Bowel sounds are normal. She exhibits no distension. There is no tenderness. There is no rebound and no guarding.   Musculoskeletal: Normal range of motion. She exhibits no edema, tenderness or deformity.   Neurological: She is alert. No cranial nerve deficit. Coordination normal.   Alert, oriented x1-3.  Self, place, time.   Skin: Skin is warm and dry. No rash noted. She is not diaphoretic. No erythema. There is pallor.   Psychiatric: She has a normal mood and affect.   Slowed    Nursing note and vitals reviewed.    Fluids    Intake/Output Summary (Last 24 hours) at 9/24/2019 1409  Last data filed at 9/24/2019 0930  Gross per 24 hour   Intake 480 ml   Output --   Net 480 ml       Laboratory  Recent Labs     09/22/19  0350 09/23/19  0246 09/24/19  0341   WBC 5.6 5.6 5.4   RBC 3.14* 2.99* 2.87*   HEMOGLOBIN 8.9* 8.3* 7.9*   HEMATOCRIT 28.4*  27.0* 26.2*   MCV 90.4 90.3 91.3   MCH 28.3 27.8 27.5   MCHC 31.3* 30.7* 30.2*   RDW 54.9* 55.5* 56.7*   PLATELETCT 173 187 182   MPV 9.7 10.0 9.8     Recent Labs     09/22/19  0350 09/23/19  0246 09/24/19  0341   SODIUM 140 141 139   POTASSIUM 3.5* 3.8 3.7   CHLORIDE 103 103 101   CO2 32 32 32   GLUCOSE 90 87 86   BUN 11 9 8   CREATININE 0.35* 0.37* 0.39*   CALCIUM 9.3 9.4 9.2                   Imaging  NM-GI BLEEDING SCAN   Final Result      Unremarkable scan for GI bleeding.      CT-ABDOMEN-PELVIS WITH   Final Result      1.  Colonic diverticulosis without evidence for diverticulitis.   2.  No CT evidence for colitis or diverticulitis.      DX-CHEST-PORTABLE (1 VIEW)   Final Result      1.  Mild pulmonary vascular congestion, improved from prior exam.   2.  No lobar pneumonia or overt pulmonary edema.   3.  Double layer calcification of the aortic knob.  Dissection is not excluded.   4.  Prominent central pulmonary vasculature are again seen suggesting pulmonary hypertension.   5.  Stable cardiomegaly.         Assessment/Plan  * GI bleed- (present on admission)  Assessment & Plan  Clear liquids, advance as tolerated  Serial hemoglobin ,7.9 this morning  Gastroenterology consulted anoscopy 9/20.  Culprit likely diverticular bleed  Continue to monitor H&H, transfuse for hemoglobin of less than 7      Hypokalemia- (present on admission)  Assessment & Plan  1.3 on admission  Continue to monitor replace as needed     Debility- (present on admission)  Assessment & Plan  Multifactorial, age, gastrointestinal bleeding  Physical and occupational therapy evaluation when able    Cognitive impairment  Assessment & Plan  Unclear baseline.  Likely undiagnosed dementia.    Benign essential HTN- (present on admission)  Assessment & Plan  Continue metoprolol     Hypothyroid- (present on admission)  Assessment & Plan  Continue Synthroid       VTE prophylaxis: SCDs, gastrointestinal bleeding

## 2019-09-24 NOTE — DIETARY
"Nutrition services: Day 5 of admit.  Марина Giron is a 88 y.o. female with admitting DX of GI bleed.  Consult received for NPO/clear liquid x 5 days.    Pt seen at bedside, not answering questions during visit, fixated on mouthwash and getting dentures in. Per ADLs, pt is consuming 50-75% of most meals on clear liquids. Per MST screen, pt had no decrease in appetite or weight PTA.      Assessment:  Height: 160 cm (5' 3\")  Weight: 81 kg (178 lb 9.2 oz) - stand up scale  Body mass index is 31.63 kg/m²., BMI classification: obesity class 1  Diet/Intake: clear liquids, 50-75% PO intake    Evaluation:   1. PMH of rectal bleed, generalized weakness, HLD, diverticulitis.  2. Pt initially presents with 3 episodes of bloody stools  3. MD notes GI bleed with diverticular bleed suspected.  4. RN reports no plan to advance clear liquids at this time as pt still having bleeding in stools.  5. MD notes advance diet as tolerated.  6. Labs: creatinine 0.39  7. Meds: synthroid, bowel protocol  8. Last BM: 9/23, small bloody    Malnutrition Risk: Malnutrition criteria not met at this time.    Recommendations/Plan:  1. Advance diet to GI soft diet as feasible. TPN not indicated at this time.  2. Boost Breeze BID , spoke with MD for verbal order.  3. Encourage intake of meals.  4. Document intake of all meals as % taken in ADL's to provide interdisciplinary communication across all shifts.   5. Monitor weight.  6. Nutrition rep will continue to see patient for ongoing meal and snack preferences.     RD to follow.          "

## 2019-09-24 NOTE — THERAPY
"Physical Therapy Evaluation completed.   Bed Mobility:  Supine to Sit: (up in chair upon entering )  Transfers: Sit to Stand: Minimal Assist  Gait: Level Of Assist: Contact Guard Assist with Front-Wheel Walker       Plan of Care: Will benefit from Physical Therapy 1 times per week  Discharge Recommendations: Equipment: Will Continue to Assess for Equipment Needs. Post-acute therapy: see below     See \"Rehab Therapy-Acute\" Patient Summary Report for complete documentation.       Pt is an 89 y/o female who presents to acute setting with a GI bleed.  Pt with dementia at baseline and unable to describe PLOF or living situation with therapy.  Pt able to ambulate with FWW and min to CGA but anticipate this is close to if not her baseline.  PT to return for mobility follow up to increase functional activity as able and to further determine baseline.  Pt will require 24/7 supervision at NH.  "

## 2019-09-24 NOTE — PROGRESS NOTES
Pt AOx4, pleasant, denies pain at this time. Pt is fatigued, resting quietly in bed. Tolerating 2L oxygen via nasal cannula. Call light within reach, personal belongings available, bed in lowest position, treaded socks on, and bed alarm on. Hourly rounding in place.

## 2019-09-24 NOTE — CARE PLAN
Problem: Nutritional:  Goal: Achieve adequate nutritional intake  Description  Patient will tolerate advanced diet and consume 50% of meals.   Outcome: PROGRESSING SLOWER THAN EXPECTED    See RD note.

## 2019-09-24 NOTE — THERAPY
"Occupational Therapy Evaluation completed.   Functional Status:  Max A LB dressing, SPV seated grooming, Mod A sit>stand from low surface, Min A functional mobility w/ FWW  Plan of Care: DC needs only until 10/21 unclear PLOF and residence.   Discharge Recommendations:  Equipment: Will Continue to Assess for Equipment Needs. Post-acute therapy Unclear where pt came from or what her PLOF was. Pt requires 24/7 assist due to dementia.     See \"Rehab Therapy-Acute\" Patient Summary Report for complete documentation.      Pt is an 87 y/o female who presents to acute following a GI bleed. Pt w/ cognitive impairment and unable to provide PLOF information. Pt admitted in May and discharged to [facility]. Unclear if pt is a long term resident or receiving skilled therapy services. Will remain available for DC needs only and increase frequency if family reports a higher PLOF. Per therapy notes in May pt appears to be at functional baseline.   "

## 2019-09-24 NOTE — CARE PLAN
Problem: Safety  Goal: Will remain free from falls  Note:   Safety precautions in place. Bed in low position, treaded socks on, personal possessions in reach, call light in reach and pt using it to call for assistance. Bed alarm on.      Problem: Knowledge Deficit  Goal: Knowledge of disease process/condition, treatment plan, diagnostic tests, and medications will improve  Note:   Discussed plan of care. Answered pt questions.

## 2019-09-24 NOTE — PROGRESS NOTES
2 RN skin check completed with CARRINGTON Turner.   Devices in place: PIV, nasal cannula.  Skin assessed under devices: yes.  Confirmed pressure ulcers found: none.  New potential pressure ulcers noted: none. Wound consult placed:  n/a.    Skin assessment: heels and elbows pink/blanching. Sacrum pink/blanching. BUE and abdomen with scattered/generalized bruising. Behind ears pink/blanching. Rest of skin intact, no open areas noted.    The following interventions in place: q2h turns, barrier cream, pillows for positioning, incontinence monitoring, bed linen changes PRN for incontinence episodes.

## 2019-09-25 LAB
ANISOCYTOSIS BLD QL SMEAR: ABNORMAL
BASOPHILS # BLD AUTO: 0.2 % (ref 0–1.8)
BASOPHILS # BLD: 0.01 K/UL (ref 0–0.12)
COMMENT 1642: NORMAL
EOSINOPHIL # BLD AUTO: 0.06 K/UL (ref 0–0.51)
EOSINOPHIL NFR BLD: 1.2 % (ref 0–6.9)
ERYTHROCYTE [DISTWIDTH] IN BLOOD BY AUTOMATED COUNT: 58.1 FL (ref 35.9–50)
HCT VFR BLD AUTO: 27.4 % (ref 37–47)
HGB BLD-MCNC: 8.1 G/DL (ref 12–16)
IMM GRANULOCYTES # BLD AUTO: 0.01 K/UL (ref 0–0.11)
IMM GRANULOCYTES NFR BLD AUTO: 0.2 % (ref 0–0.9)
LYMPHOCYTES # BLD AUTO: 1.2 K/UL (ref 1–4.8)
LYMPHOCYTES NFR BLD: 24.8 % (ref 22–41)
MACROCYTES BLD QL SMEAR: ABNORMAL
MCH RBC QN AUTO: 27.2 PG (ref 27–33)
MCHC RBC AUTO-ENTMCNC: 29.6 G/DL (ref 33.6–35)
MCV RBC AUTO: 91.9 FL (ref 81.4–97.8)
MONOCYTES # BLD AUTO: 0.69 K/UL (ref 0–0.85)
MONOCYTES NFR BLD AUTO: 14.3 % (ref 0–13.4)
MORPHOLOGY BLD-IMP: NORMAL
NEUTROPHILS # BLD AUTO: 2.86 K/UL (ref 2–7.15)
NEUTROPHILS NFR BLD: 59.3 % (ref 44–72)
NRBC # BLD AUTO: 0 K/UL
NRBC BLD-RTO: 0 /100 WBC
OVALOCYTES BLD QL SMEAR: NORMAL
PLATELET # BLD AUTO: 203 K/UL (ref 164–446)
PLATELET BLD QL SMEAR: NORMAL
PMV BLD AUTO: 9.5 FL (ref 9–12.9)
POIKILOCYTOSIS BLD QL SMEAR: NORMAL
RBC # BLD AUTO: 2.98 M/UL (ref 4.2–5.4)
RBC BLD AUTO: PRESENT
WBC # BLD AUTO: 4.8 K/UL (ref 4.8–10.8)

## 2019-09-25 PROCEDURE — 770006 HCHG ROOM/CARE - MED/SURG/GYN SEMI*

## 2019-09-25 PROCEDURE — 36415 COLL VENOUS BLD VENIPUNCTURE: CPT

## 2019-09-25 PROCEDURE — 85025 COMPLETE CBC W/AUTO DIFF WBC: CPT

## 2019-09-25 PROCEDURE — 99232 SBSQ HOSP IP/OBS MODERATE 35: CPT | Performed by: HOSPITALIST

## 2019-09-25 PROCEDURE — 700102 HCHG RX REV CODE 250 W/ 637 OVERRIDE(OP): Performed by: FAMILY MEDICINE

## 2019-09-25 PROCEDURE — A9270 NON-COVERED ITEM OR SERVICE: HCPCS | Performed by: FAMILY MEDICINE

## 2019-09-25 RX ADMIN — METOPROLOL TARTRATE 25 MG: 25 TABLET, FILM COATED ORAL at 17:22

## 2019-09-25 RX ADMIN — LATANOPROST 1 DROP: 50 SOLUTION OPHTHALMIC at 17:22

## 2019-09-25 RX ADMIN — POLYVINYL ALCOHOL 1 DROP: 14 SOLUTION/ DROPS OPHTHALMIC at 05:18

## 2019-09-25 RX ADMIN — LEVOTHYROXINE SODIUM 112 MCG: 112 TABLET ORAL at 05:18

## 2019-09-25 ASSESSMENT — ENCOUNTER SYMPTOMS
FEVER: 0
BLOOD IN STOOL: 0
SORE THROAT: 0
EYE REDNESS: 0
NERVOUS/ANXIOUS: 0
BRUISES/BLEEDS EASILY: 0
MYALGIAS: 0
SPEECH CHANGE: 0
COUGH: 0
VOMITING: 0
ABDOMINAL PAIN: 0
LOSS OF CONSCIOUSNESS: 0
MEMORY LOSS: 1
SEIZURES: 0
DIARRHEA: 0
EYE PAIN: 0
FLANK PAIN: 0
HEMOPTYSIS: 0
CHILLS: 0
STRIDOR: 0
SHORTNESS OF BREATH: 0
EYE DISCHARGE: 0
FOCAL WEAKNESS: 0
PALPITATIONS: 0
SPUTUM PRODUCTION: 0

## 2019-09-25 NOTE — CARE PLAN
Problem: Safety  Goal: Will remain free from falls  Note:   Safety precautions in place. Bed in low position, treaded socks on, personal possessions in reach, call light in reach and pt using it to call for assistance. Bed alarm on.      Problem: Pain Management  Goal: Pain level will decrease to patient's comfort goal  Note:   Pt denies pain at this time.

## 2019-09-25 NOTE — PROGRESS NOTES
2 RN skin check completed with CARRINGTON Warner.   Devices in place: PIV, nasal cannula.  Skin assessed under devices: yes.  Confirmed pressure ulcers found: none.  New potential pressure ulcers noted: none. Wound consult placed:  n/a.     Skin assessment: BUE and abdomen with scattered/generalized bruising. Heels and elbows pink/blanching. Sacrum pink/blanching. Behind ears pink/blanching. Rest of skin intact, no open areas noted.     The following interventions in place: q2h turns, barrier cream, pillows for positioning, incontinence monitoring, bed linen changes PRN for incontinence episodes.

## 2019-09-25 NOTE — PROGRESS NOTES
Pt fatigued this evening. Denies pain/discomfort. Pt resting quietly in bed. Call light within reach, personal belongings available, bed in lowest position, treaded socks on, and bed alarm on. Hourly rounding in place.

## 2019-09-25 NOTE — PROGRESS NOTES
[facility] Medicine Daily Progress Note    Date of Service  9/25/2019    Chief Complaint  88 y.o. female admitted 9/19/2019 with lower gastrointestinal bleeding    Hospital Course      88 years old female with past medical history of colonic polyposis, diverticulosis, admitted 9/19 with lower GI bleeding.  Gastroenterology have been consulted. anoscopy 9/20.  Culprit likely diverticular bleed          Interval Problem Update  This heart rate 70s-80s, blood pressure within normal limits this morning.  Patient is passing a few clots, ? old blood, continue to morning.    Hb 8.3,    Hypokalemia improved, K 3.8   Transfuse for hemoglobin less than 7.  Alert, variably and intermittently oriented x1-x3  Patient is very weak, PT OT , will likely need SNF   Discussed with patient, patient's nurse and with multidisciplinary team during rounds including , and charge nurse.        9/24: Been seen and examined this morning she is very hard of hearing she sitting at bedside.  Denies any acute complaints.  She has had a few bowel movements that have been bloody since last night as reported by nursing.  However her hemoglobin is stable 7.9    9/25: Patient seen and examined today she is lying in bed and she continues to be very hard of hearing.  There is no family bedside.  Hemoglobin appears to be over 8 this morning.  She denies any acute complaints or concerns.  PT OT recommended skilled nursing facility which I ordered and  is aware.  Consultants/Specialty  Gastroenterology    Code Status  Full code    Disposition  Pending SNF     Review of Systems  Review of Systems   Constitutional: Positive for malaise/fatigue. Negative for chills and fever.   HENT: Negative for congestion and sore throat.    Eyes: Negative for pain, discharge and redness.   Respiratory: Negative for cough, hemoptysis, sputum production, shortness of breath and stridor.    Cardiovascular: Negative for chest pain, palpitations  and leg swelling.   Gastrointestinal: Negative for abdominal pain, blood in stool, diarrhea and vomiting.   Genitourinary: Negative for flank pain, hematuria and urgency.   Musculoskeletal: Negative for myalgias.   Neurological: Negative for speech change, focal weakness, seizures and loss of consciousness.   Endo/Heme/Allergies: Does not bruise/bleed easily.   Psychiatric/Behavioral: Positive for memory loss. The patient is not nervous/anxious.       Physical Exam  Temp:  [36.1 °C (97 °F)-36.6 °C (97.9 °F)] 36.5 °C (97.7 °F)  Pulse:  [] 102  Resp:  [18-19] 18  BP: (115-128)/(56-62) 124/62  SpO2:  [93 %-97 %] 93 %    Physical Exam   Constitutional: No distress.   Elderly, friable   HENT:   Head: Normocephalic and atraumatic.   Right Ear: External ear normal.   Left Ear: External ear normal.   Eyes: Pupils are equal, round, and reactive to light. Conjunctivae are normal. Right eye exhibits no discharge. Left eye exhibits no discharge.   Neck: Normal range of motion. Neck supple. No JVD present. No tracheal deviation present. No thyromegaly present.   Cardiovascular: Exam reveals no gallop and no friction rub.   No murmur heard.  Pulmonary/Chest: Effort normal and breath sounds normal. No stridor. No respiratory distress. She has no wheezes. She has no rales. She exhibits no tenderness.   Abdominal: Soft. Bowel sounds are normal. She exhibits no distension. There is no tenderness. There is no rebound and no guarding.   Musculoskeletal: Normal range of motion. She exhibits no edema, tenderness or deformity.   Neurological: She is alert. No cranial nerve deficit. Coordination normal.   Alert, oriented x1-3.  Self, place, time.   Skin: Skin is warm and dry. No rash noted. She is not diaphoretic. No erythema. There is pallor.   Psychiatric: She has a normal mood and affect.   Slowed    Nursing note and vitals reviewed.    Fluids  No intake or output data in the 24 hours ending 09/25/19 1403    Laboratory  Recent  Labs     09/23/19  0246 09/24/19  0341 09/25/19  1147   WBC 5.6 5.4 4.8   RBC 2.99* 2.87* 2.98*   HEMOGLOBIN 8.3* 7.9* 8.1*   HEMATOCRIT 27.0* 26.2* 27.4*   MCV 90.3 91.3 91.9   MCH 27.8 27.5 27.2   MCHC 30.7* 30.2* 29.6*   RDW 55.5* 56.7* 58.1*   PLATELETCT 187 182 203   MPV 10.0 9.8 9.5     Recent Labs     09/23/19  0246 09/24/19  0341   SODIUM 141 139   POTASSIUM 3.8 3.7   CHLORIDE 103 101   CO2 32 32   GLUCOSE 87 86   BUN 9 8   CREATININE 0.37* 0.39*   CALCIUM 9.4 9.2                   Imaging  NM-GI BLEEDING SCAN   Final Result      Unremarkable scan for GI bleeding.      CT-ABDOMEN-PELVIS WITH   Final Result      1.  Colonic diverticulosis without evidence for diverticulitis.   2.  No CT evidence for colitis or diverticulitis.      DX-CHEST-PORTABLE (1 VIEW)   Final Result      1.  Mild pulmonary vascular congestion, improved from prior exam.   2.  No lobar pneumonia or overt pulmonary edema.   3.  Double layer calcification of the aortic knob.  Dissection is not excluded.   4.  Prominent central pulmonary vasculature are again seen suggesting pulmonary hypertension.   5.  Stable cardiomegaly.         Assessment/Plan  * GI bleed- (present on admission)  Assessment & Plan  Clear liquids, dancing diet today to full liquid diet   serial hemoglobin , over 8 this morning in stable  Gastroenterology consulted anoscopy 9/20.  Culprit likely diverticular bleed  Continue to monitor H&H, transfuse for hemoglobin of less than 7      Hypokalemia- (present on admission)  Assessment & Plan  1.3 on admission  Continue to monitor replace as needed     Currently it is 3.7 and stable    Debility- (present on admission)  Assessment & Plan  Multifactorial, age, gastrointestinal bleeding  Physical and occupational therapy evaluation commended skilled nursing facility which I have ordered    Cognitive impairment  Assessment & Plan  Unclear baseline.  Likely undiagnosed dementia.    Benign essential HTN- (present on  admission)  Assessment & Plan  Continue metoprolol     Hypothyroid- (present on admission)  Assessment & Plan  Continue Synthroid       VTE prophylaxis: SCDs, gastrointestinal bleeding    Discussed plan of care with nursing today as patient has history of dementia and is only alert to herself and very hard of hearing.

## 2019-09-25 NOTE — CARE PLAN
Problem: Communication  Goal: The ability to communicate needs accurately and effectively will improve  Outcome: PROGRESSING AS EXPECTED  Patient is hard of hearing. Encouraged patient to voice needs and feelings.      Problem: Safety  Goal: Will remain free from injury  Outcome: PROGRESSING AS EXPECTED  Patient is a fall risk. Safety precautions and hourly rounding in place.

## 2019-09-25 NOTE — DISCHARGE PLANNING
Care Transition Team Discharge Planning    Anticipates discharge disposition:  · To Home     Action:  · This RN CM met with patient at bedside to assess her needs for discharge. Pt is very hard of hearing.  · This RN CM spoke with pt's daughter Sofia Araiza, who claims that she is the pt's POA.  ·  I further explained to pt's daughter that Dr Ahumada ordered pt to be discharged to a Skilled Nursing Facility and daughter/POA refused. Per pt's daughter she and pt talked about this and they both decided to bring her home to [place], [place]. Pt;s daughter further states that she helps pt with ADLs  and that pt has 24 hour care with her.  · I explained to pt's daughter that it is for pt  benefit so she can get her strength back  before going home but pt's daughter               refused. She states that she and the pt had a very bad experience from a previous facility like feeding her food she cannot eat that contributed to pt developing diverticulitis. I explained to sofia to think about it again and let me know if she changes her mind.   · This RN CM also requested Sofia Araiza, pt's daughter to email me a copy of her POA and states she will.    Barriers to Discharge:  • Pending Medical Clearance    Plan:  • Will update Care Team  • Will continue to provide service as needed

## 2019-09-25 NOTE — PROGRESS NOTES
Received report from night shift and assumed care. Assessment completed, POC discussed. Pt is A&Ox3, disoriented to event. Is very hard of hearing. Denies pain or discomfort. States she is agreeable to get up for meals. Will coordinate with CNA. All other needs met. Safety precautions and hourly rounding in place.

## 2019-09-26 LAB
BASOPHILS # BLD AUTO: 0.5 % (ref 0–1.8)
BASOPHILS # BLD: 0.03 K/UL (ref 0–0.12)
EOSINOPHIL # BLD AUTO: 0.1 K/UL (ref 0–0.51)
EOSINOPHIL NFR BLD: 1.8 % (ref 0–6.9)
ERYTHROCYTE [DISTWIDTH] IN BLOOD BY AUTOMATED COUNT: 59.1 FL (ref 35.9–50)
HCT VFR BLD AUTO: 25.8 % (ref 37–47)
HGB BLD-MCNC: 8.1 G/DL (ref 12–16)
IMM GRANULOCYTES # BLD AUTO: 0.01 K/UL (ref 0–0.11)
IMM GRANULOCYTES NFR BLD AUTO: 0.2 % (ref 0–0.9)
LYMPHOCYTES # BLD AUTO: 1.45 K/UL (ref 1–4.8)
LYMPHOCYTES NFR BLD: 25.9 % (ref 22–41)
MCH RBC QN AUTO: 29.2 PG (ref 27–33)
MCHC RBC AUTO-ENTMCNC: 31.4 G/DL (ref 33.6–35)
MCV RBC AUTO: 93.1 FL (ref 81.4–97.8)
MONOCYTES # BLD AUTO: 0.56 K/UL (ref 0–0.85)
MONOCYTES NFR BLD AUTO: 10 % (ref 0–13.4)
NEUTROPHILS # BLD AUTO: 3.45 K/UL (ref 2–7.15)
NEUTROPHILS NFR BLD: 61.6 % (ref 44–72)
NRBC # BLD AUTO: 0 K/UL
NRBC BLD-RTO: 0 /100 WBC
PLATELET # BLD AUTO: 227 K/UL (ref 164–446)
PMV BLD AUTO: 9.9 FL (ref 9–12.9)
RBC # BLD AUTO: 2.77 M/UL (ref 4.2–5.4)
WBC # BLD AUTO: 5.6 K/UL (ref 4.8–10.8)

## 2019-09-26 PROCEDURE — 90471 IMMUNIZATION ADMIN: CPT

## 2019-09-26 PROCEDURE — 700102 HCHG RX REV CODE 250 W/ 637 OVERRIDE(OP): Performed by: FAMILY MEDICINE

## 2019-09-26 PROCEDURE — 90662 IIV NO PRSV INCREASED AG IM: CPT | Performed by: HOSPITALIST

## 2019-09-26 PROCEDURE — A9270 NON-COVERED ITEM OR SERVICE: HCPCS | Performed by: HOSPITALIST

## 2019-09-26 PROCEDURE — 700102 HCHG RX REV CODE 250 W/ 637 OVERRIDE(OP): Performed by: HOSPITALIST

## 2019-09-26 PROCEDURE — 770006 HCHG ROOM/CARE - MED/SURG/GYN SEMI*

## 2019-09-26 PROCEDURE — 99233 SBSQ HOSP IP/OBS HIGH 50: CPT | Performed by: HOSPITALIST

## 2019-09-26 PROCEDURE — 36415 COLL VENOUS BLD VENIPUNCTURE: CPT

## 2019-09-26 PROCEDURE — 700111 HCHG RX REV CODE 636 W/ 250 OVERRIDE (IP): Performed by: HOSPITALIST

## 2019-09-26 PROCEDURE — 85025 COMPLETE CBC W/AUTO DIFF WBC: CPT

## 2019-09-26 PROCEDURE — 3E0234Z INTRODUCTION OF SERUM, TOXOID AND VACCINE INTO MUSCLE, PERCUTANEOUS APPROACH: ICD-10-PCS | Performed by: HOSPITALIST

## 2019-09-26 PROCEDURE — A9270 NON-COVERED ITEM OR SERVICE: HCPCS | Performed by: FAMILY MEDICINE

## 2019-09-26 RX ORDER — HYDROCORTISONE ACETATE 25 MG/1
25 SUPPOSITORY RECTAL EVERY 12 HOURS
Status: DISCONTINUED | OUTPATIENT
Start: 2019-09-26 | End: 2019-10-03 | Stop reason: HOSPADM

## 2019-09-26 RX ADMIN — LATANOPROST 1 DROP: 50 SOLUTION OPHTHALMIC at 17:17

## 2019-09-26 RX ADMIN — HYDROCORTISONE ACETATE 25 MG: 25 SUPPOSITORY RECTAL at 17:18

## 2019-09-26 RX ADMIN — INFLUENZA A VIRUS A/MICHIGAN/45/2015 X-275 (H1N1) ANTIGEN (FORMALDEHYDE INACTIVATED), INFLUENZA A VIRUS A/SINGAPORE/INFIMH-16-0019/2016 IVR-186 (H3N2) ANTIGEN (FORMALDEHYDE INACTIVATED), AND INFLUENZA B VIRUS B/MARYLAND/15/2016 BX-69A (A B/COLORADO/6/2017-LIKE VIRUS) ANTIGEN (FORMALDEHYDE INACTIVATED) 0.5 ML: 60; 60; 60 INJECTION, SUSPENSION INTRAMUSCULAR at 15:04

## 2019-09-26 RX ADMIN — POLYVINYL ALCOHOL 1 DROP: 14 SOLUTION/ DROPS OPHTHALMIC at 05:22

## 2019-09-26 RX ADMIN — ACETAMINOPHEN 650 MG: 325 TABLET, FILM COATED ORAL at 21:39

## 2019-09-26 RX ADMIN — LEVOTHYROXINE SODIUM 112 MCG: 112 TABLET ORAL at 05:22

## 2019-09-26 RX ADMIN — METOPROLOL TARTRATE 25 MG: 25 TABLET, FILM COATED ORAL at 05:22

## 2019-09-26 RX ADMIN — METOPROLOL TARTRATE 25 MG: 25 TABLET, FILM COATED ORAL at 17:17

## 2019-09-26 RX ADMIN — PSYLLIUM HUSK 1 PACKET: 3.4 POWDER ORAL at 15:03

## 2019-09-26 RX ADMIN — ACETAMINOPHEN 650 MG: 325 TABLET, FILM COATED ORAL at 08:43

## 2019-09-26 ASSESSMENT — ENCOUNTER SYMPTOMS
MEMORY LOSS: 1
BLOOD IN STOOL: 1
BRUISES/BLEEDS EASILY: 0
LOSS OF CONSCIOUSNESS: 0

## 2019-09-26 NOTE — CARE PLAN
Problem: Nutritional:  Goal: Achieve adequate nutritional intake  Description  Patient will tolerate advanced diet and consume 50% of meals.   Outcome: PROGRESSING AS EXPECTED    Spoke with MD yesterday, diet advanced to full liquids. Noted 1 meal 25-50%  and dinner last night 50-75% on full liquids. Boost breeze updated to boost plus yesterday.    RD to follow.

## 2019-09-26 NOTE — CARE PLAN
Problem: Safety  Goal: Will remain free from injury  Outcome: PROGRESSING AS EXPECTED  Patient is at risk for falls. Strip alarm in place. Educated patient to call prior to getting out of bed.     Problem: Bowel/Gastric:  Goal: Normal bowel function is maintained or improved  Outcome: PROGRESSING AS EXPECTED  Patient is continuing to have dark bloody stools. MD aware. CBC ordered, will continue to monitor.

## 2019-09-26 NOTE — PROGRESS NOTES
[facility] Medicine Daily Progress Note    Date of Service  9/26/2019    Chief Complaint  88 y.o. female admitted 9/19/2019 with lower gastrointestinal bleeding    Hospital Course      88 years old female with past medical history of colonic polyposis, diverticulosis, admitted 9/19 with lower GI bleeding.  Gastroenterology have been consulted. anoscopy 9/20.  Culprit likely diverticular bleed          Interval Problem Update  This heart rate 70s-80s, blood pressure within normal limits this morning.  Patient is passing a few clots, ? old blood, continue to morning.    Hb 8.3,    Hypokalemia improved, K 3.8   Transfuse for hemoglobin less than 7.  Alert, variably and intermittently oriented x1-x3  Patient is very weak, PT OT , will likely need SNF   Discussed with patient, patient's nurse and with multidisciplinary team during rounds including , and charge nurse.        9/24: Been seen and examined this morning she is very hard of hearing she sitting at bedside.  Denies any acute complaints.  She has had a few bowel movements that have been bloody since last night as reported by nursing.  However her hemoglobin is stable 7.9    9/25: Patient seen and examined today she is lying in bed and she continues to be very hard of hearing.  There is no family bedside.  Hemoglobin appears to be over 8 this morning.  She denies any acute complaints or concerns.  PT OT recommended skilled nursing facility which I ordered and  is aware.    9/26: Patient seen and examined this morning she continues to lay in bed is not a reliable historian and she is very hard of hearing.  Per nursing report her daughter does not want her to go to skilled nursing facility and will provide 24-hour care at home however patient continues to have bloody diarrhea which is been continued since her admission.  She has already seen her and has performed the anoscope which found diverticular bleed.  Her hemoglobins have been  stable however I am afraid if she goes home and continues to bleed it may cause problem.    We will reconsult GI again to see if they have any further recommendations  Consultants/Specialty  Gastroenterology    Code Status  Full code    Disposition  Home when medically stable     Review of Systems  Review of Systems   Unable to perform ROS: Dementia   Constitutional: Positive for malaise/fatigue.   Cardiovascular: Negative for chest pain.   Gastrointestinal: Positive for blood in stool.   Neurological: Negative for loss of consciousness.   Endo/Heme/Allergies: Does not bruise/bleed easily.   Psychiatric/Behavioral: Positive for memory loss.      Physical Exam  Temp:  [36.2 °C (97.1 °F)-36.9 °C (98.4 °F)] 36.6 °C (97.8 °F)  Pulse:  [81-94] 81  Resp:  [14-16] 16  BP: ()/(52-73) 145/61  SpO2:  [94 %-98 %] 98 %    Physical Exam   Constitutional: No distress.   Elderly, friable   HENT:   Head: Normocephalic and atraumatic.   Right Ear: External ear normal.   Left Ear: External ear normal.   Eyes: Pupils are equal, round, and reactive to light. Conjunctivae are normal. Right eye exhibits no discharge. Left eye exhibits no discharge.   Neck: Normal range of motion. Neck supple. No JVD present. No tracheal deviation present. No thyromegaly present.   Cardiovascular: Exam reveals no gallop and no friction rub.   No murmur heard.  Pulmonary/Chest: Effort normal and breath sounds normal. No stridor. No respiratory distress. She has no wheezes. She has no rales. She exhibits no tenderness.   Abdominal: Soft. Bowel sounds are normal. She exhibits no distension. There is no tenderness. There is no rebound and no guarding.   Musculoskeletal: Normal range of motion. She exhibits no edema, tenderness or deformity.   Neurological: She is alert. No cranial nerve deficit. Coordination normal.   Alert to self only, hard of hearing   Skin: Skin is warm and dry. No rash noted. She is not diaphoretic. No erythema. There is pallor.    Psychiatric: She has a normal mood and affect.   Slowed    Nursing note and vitals reviewed.    Fluids    Intake/Output Summary (Last 24 hours) at 9/26/2019 1330  Last data filed at 9/26/2019 1247  Gross per 24 hour   Intake 640 ml   Output 350 ml   Net 290 ml       Laboratory  Recent Labs     09/24/19  0341 09/25/19  1147 09/26/19  1018   WBC 5.4 4.8 5.6   RBC 2.87* 2.98* 2.77*   HEMOGLOBIN 7.9* 8.1* 8.1*   HEMATOCRIT 26.2* 27.4* 25.8*   MCV 91.3 91.9 93.1   MCH 27.5 27.2 29.2   MCHC 30.2* 29.6* 31.4*   RDW 56.7* 58.1* 59.1*   PLATELETCT 182 203 227   MPV 9.8 9.5 9.9     Recent Labs     09/24/19  0341   SODIUM 139   POTASSIUM 3.7   CHLORIDE 101   CO2 32   GLUCOSE 86   BUN 8   CREATININE 0.39*   CALCIUM 9.2                   Imaging  NM-GI BLEEDING SCAN   Final Result      Unremarkable scan for GI bleeding.      CT-ABDOMEN-PELVIS WITH   Final Result      1.  Colonic diverticulosis without evidence for diverticulitis.   2.  No CT evidence for colitis or diverticulitis.      DX-CHEST-PORTABLE (1 VIEW)   Final Result      1.  Mild pulmonary vascular congestion, improved from prior exam.   2.  No lobar pneumonia or overt pulmonary edema.   3.  Double layer calcification of the aortic knob.  Dissection is not excluded.   4.  Prominent central pulmonary vasculature are again seen suggesting pulmonary hypertension.   5.  Stable cardiomegaly.         Assessment/Plan  * GI bleed- (present on admission)  Assessment & Plan  Clear liquids, dancing diet today to full liquid diet; he continues to have bloody stools   serial hemoglobin , over 8 this morning in stable  Gastroenterology consulted anoscopy 9/20.  Culprit likely diverticular bleed  Continue to monitor H&H, transfuse for hemoglobin of less than 7      As patient continues to have bloody stools, concern for internal hemorrhoidal bleed at this time  I will reconsult GI for further recommendations prior to discharging home.  Recommendations are to add on hydrocortisone  25 mg suppository twice daily x10 days and add 1 tablespoon of Metamucil with each meal.    Hypokalemia- (present on admission)  Assessment & Plan  Resolved    Debility- (present on admission)  Assessment & Plan  Multifactorial, age, gastrointestinal bleeding  Physical and occupational therapy evaluation commended skilled nursing facility which I have ordered however patient's daughter does not want her to go to skilled nursing facility and states that she will bring her home with 24-hour assistance    Cognitive impairment  Assessment & Plan  Unclear baseline.  Likely undiagnosed dementia.    Benign essential HTN- (present on admission)  Assessment & Plan  Continue metoprolol     Hypothyroid- (present on admission)  Assessment & Plan  Continue Synthroid       VTE prophylaxis: SCDs, gastrointestinal bleeding    Discussed plan of care with nursing today as patient has history of dementia and is only alert to herself and very hard of hearing.  Will reconsult GI for continued GI bleed

## 2019-09-26 NOTE — PROGRESS NOTES
2 RN skin check complete with CARRINGTON Rangel  Devices in place: n/a  Skin assessed under devices: n/a  Confirmed pressure ulcers found: n/a  New potential pressure ulcers noted: n/a  The following interventions in place: barrier cream, Q2 turns, incontinence checks     Notes: Sacrum pink and blanching, heels red and blanching, elbows red and blanching

## 2019-09-26 NOTE — PROGRESS NOTES
Received report from night shift and assumed care. Assessment completed, POC discussed. Received in report that patient is still having bloody stools at this time. States she has headache pain 5/10, tylenol administered per MAR. Assisted patient with cleaning dentures and provided apple juice upon request. All other needs met at this time. Safety precautions and hourly rounding in place.     0930: MD updated on patient's persistent bloody stools.

## 2019-09-27 LAB
BASOPHILS # BLD AUTO: 0.4 % (ref 0–1.8)
BASOPHILS # BLD: 0.02 K/UL (ref 0–0.12)
EOSINOPHIL # BLD AUTO: 0.1 K/UL (ref 0–0.51)
EOSINOPHIL NFR BLD: 1.8 % (ref 0–6.9)
ERYTHROCYTE [DISTWIDTH] IN BLOOD BY AUTOMATED COUNT: 58.4 FL (ref 35.9–50)
HCT VFR BLD AUTO: 27.2 % (ref 37–47)
HGB BLD-MCNC: 8.2 G/DL (ref 12–16)
IMM GRANULOCYTES # BLD AUTO: 0.01 K/UL (ref 0–0.11)
IMM GRANULOCYTES NFR BLD AUTO: 0.2 % (ref 0–0.9)
LYMPHOCYTES # BLD AUTO: 1.39 K/UL (ref 1–4.8)
LYMPHOCYTES NFR BLD: 24.3 % (ref 22–41)
MCH RBC QN AUTO: 27.6 PG (ref 27–33)
MCHC RBC AUTO-ENTMCNC: 30.1 G/DL (ref 33.6–35)
MCV RBC AUTO: 91.6 FL (ref 81.4–97.8)
MONOCYTES # BLD AUTO: 0.53 K/UL (ref 0–0.85)
MONOCYTES NFR BLD AUTO: 9.3 % (ref 0–13.4)
NEUTROPHILS # BLD AUTO: 3.66 K/UL (ref 2–7.15)
NEUTROPHILS NFR BLD: 64 % (ref 44–72)
NRBC # BLD AUTO: 0 K/UL
NRBC BLD-RTO: 0 /100 WBC
PLATELET # BLD AUTO: 249 K/UL (ref 164–446)
PMV BLD AUTO: 9.1 FL (ref 9–12.9)
RBC # BLD AUTO: 2.97 M/UL (ref 4.2–5.4)
WBC # BLD AUTO: 5.7 K/UL (ref 4.8–10.8)

## 2019-09-27 PROCEDURE — A9270 NON-COVERED ITEM OR SERVICE: HCPCS | Performed by: HOSPITALIST

## 2019-09-27 PROCEDURE — 85025 COMPLETE CBC W/AUTO DIFF WBC: CPT

## 2019-09-27 PROCEDURE — 700102 HCHG RX REV CODE 250 W/ 637 OVERRIDE(OP): Performed by: HOSPITALIST

## 2019-09-27 PROCEDURE — A9270 NON-COVERED ITEM OR SERVICE: HCPCS | Performed by: FAMILY MEDICINE

## 2019-09-27 PROCEDURE — 99232 SBSQ HOSP IP/OBS MODERATE 35: CPT | Performed by: HOSPITALIST

## 2019-09-27 PROCEDURE — 770006 HCHG ROOM/CARE - MED/SURG/GYN SEMI*

## 2019-09-27 PROCEDURE — 700102 HCHG RX REV CODE 250 W/ 637 OVERRIDE(OP): Performed by: FAMILY MEDICINE

## 2019-09-27 PROCEDURE — 36415 COLL VENOUS BLD VENIPUNCTURE: CPT

## 2019-09-27 RX ADMIN — POLYVINYL ALCOHOL 1 DROP: 14 SOLUTION/ DROPS OPHTHALMIC at 06:00

## 2019-09-27 RX ADMIN — PSYLLIUM HUSK 1 PACKET: 3.4 POWDER ORAL at 07:31

## 2019-09-27 RX ADMIN — HYDROCORTISONE ACETATE 25 MG: 25 SUPPOSITORY RECTAL at 17:45

## 2019-09-27 RX ADMIN — METOPROLOL TARTRATE 25 MG: 25 TABLET, FILM COATED ORAL at 06:00

## 2019-09-27 RX ADMIN — HYDROCORTISONE ACETATE 25 MG: 25 SUPPOSITORY RECTAL at 07:31

## 2019-09-27 RX ADMIN — METOPROLOL TARTRATE 25 MG: 25 TABLET, FILM COATED ORAL at 17:46

## 2019-09-27 RX ADMIN — LATANOPROST 1 DROP: 50 SOLUTION OPHTHALMIC at 17:46

## 2019-09-27 RX ADMIN — LEVOTHYROXINE SODIUM 112 MCG: 112 TABLET ORAL at 06:00

## 2019-09-27 ASSESSMENT — ENCOUNTER SYMPTOMS
LOSS OF CONSCIOUSNESS: 0
BRUISES/BLEEDS EASILY: 0
MEMORY LOSS: 1
BLOOD IN STOOL: 0

## 2019-09-27 NOTE — PROGRESS NOTES
Patient is A&O x 4, [place]. cooerative with cares. Continue to monitor for blood in stool, no BM this shift.

## 2019-09-27 NOTE — PROGRESS NOTES
Received report from night shift and assumed care. Assessment completed, POC discussed. Pt is A&Ox2, is [place]. disoriented to place and event. Per night shift RN, pt did not have a BM last night. Pt denies pain or discomfort. Medications taken without difficulty. All needs met. Safety precautions and hourly rounding in place.       1400: patient is still having melena during BM. Patient's daughter Sofia also at bedside requesting to speak with SW. SW notified.

## 2019-09-27 NOTE — PROGRESS NOTES
[facility] Medicine Daily Progress Note    Date of Service  9/27/2019    Chief Complaint  88 y.o. female admitted 9/19/2019 with lower gastrointestinal bleeding    Hospital Course      88 years old female with past medical history of colonic polyposis, diverticulosis, admitted 9/19 with lower GI bleeding.  Gastroenterology have been consulted. anoscopy 9/20.  Culprit likely diverticular bleed          Interval Problem Update  This heart rate 70s-80s, blood pressure within normal limits this morning.  Patient is passing a few clots, ? old blood, continue to morning.    Hb 8.3,    Hypokalemia improved, K 3.8   Transfuse for hemoglobin less than 7.  Alert, variably and intermittently oriented x1-x3  Patient is very weak, PT OT , will likely need SNF   Discussed with patient, patient's nurse and with multidisciplinary team during rounds including , and charge nurse.        9/24: Been seen and examined this morning she is very hard of hearing she sitting at bedside.  Denies any acute complaints.  She has had a few bowel movements that have been bloody since last night as reported by nursing.  However her hemoglobin is stable 7.9    9/25: Patient seen and examined today she is lying in bed and she continues to be very hard of hearing.  There is no family bedside.  Hemoglobin appears to be over 8 this morning.  She denies any acute complaints or concerns.  PT OT recommended skilled nursing facility which I ordered and  is aware.    9/26: Patient seen and examined this morning she continues to lay in bed is not a reliable historian and she is very hard of hearing.  Per nursing report her daughter does not want her to go to skilled nursing facility and will provide 24-hour care at home however patient continues to have bloody diarrhea which is been continued since her admission.  She has already seen her and has performed the anoscope which found diverticular bleed.  Her hemoglobins have been  stable however I am afraid if she goes home and continues to bleed it may cause problem.    We will reconsult GI again to see if they have any further recommendations    9/27: seen and examined, sitting in chair, hard of hearing, really does not communicate well. No family at bedside. Patient did not have a BM last night,  Vitals are stable bp 149/86  hgb 8.7  Consultants/Specialty  Gastroenterology    Code Status  Full code    Disposition  Home when medically stable     Review of Systems  Review of Systems   Unable to perform ROS: Dementia   Constitutional: Positive for malaise/fatigue.   Cardiovascular: Negative for chest pain.   Gastrointestinal: Negative for blood in stool.   Neurological: Negative for loss of consciousness.   Endo/Heme/Allergies: Does not bruise/bleed easily.   Psychiatric/Behavioral: Positive for memory loss.      Physical Exam  Temp:  [36.2 °C (97.2 °F)-36.7 °C (98 °F)] 36.6 °C (97.8 °F)  Pulse:  [89-97] 89  Resp:  [16-18] 16  BP: (125-152)/(51-86) 149/86  SpO2:  [93 %-98 %] 96 %    Physical Exam   Constitutional: No distress.   Elderly, friable   HENT:   Head: Normocephalic and atraumatic.   Right Ear: External ear normal.   Left Ear: External ear normal.   Eyes: Pupils are equal, round, and reactive to light. Conjunctivae are normal. Right eye exhibits no discharge. Left eye exhibits no discharge.   Neck: Normal range of motion. Neck supple. No JVD present. No tracheal deviation present. No thyromegaly present.   Cardiovascular: Exam reveals no gallop and no friction rub.   No murmur heard.  Pulmonary/Chest: Effort normal and breath sounds normal. No stridor. No respiratory distress. She has no wheezes. She has no rales. She exhibits no tenderness.   Abdominal: Soft. Bowel sounds are normal. She exhibits no distension. There is no tenderness. There is no rebound and no guarding.   Musculoskeletal: Normal range of motion. She exhibits no edema, tenderness or deformity.   Neurological: She is  alert. No cranial nerve deficit. Coordination normal.   Alert to self only, hard of hearing   Skin: Skin is warm and dry. No rash noted. She is not diaphoretic. No erythema. There is pallor.   Psychiatric: She has a normal mood and affect.   Slowed    Nursing note and vitals reviewed.    Fluids    Intake/Output Summary (Last 24 hours) at 9/27/2019 1029  Last data filed at 9/27/2019 1000  Gross per 24 hour   Intake 1060 ml   Output 200 ml   Net 860 ml       Laboratory  Recent Labs     09/25/19  1147 09/26/19  1018 09/27/19  0943   WBC 4.8 5.6 5.7   RBC 2.98* 2.77* 2.97*   HEMOGLOBIN 8.1* 8.1* 8.2*   HEMATOCRIT 27.4* 25.8* 27.2*   MCV 91.9 93.1 91.6   MCH 27.2 29.2 27.6   MCHC 29.6* 31.4* 30.1*   RDW 58.1* 59.1* 58.4*   PLATELETCT 203 227 249   MPV 9.5 9.9 9.1                       Imaging  NM-GI BLEEDING SCAN   Final Result      Unremarkable scan for GI bleeding.      CT-ABDOMEN-PELVIS WITH   Final Result      1.  Colonic diverticulosis without evidence for diverticulitis.   2.  No CT evidence for colitis or diverticulitis.      DX-CHEST-PORTABLE (1 VIEW)   Final Result      1.  Mild pulmonary vascular congestion, improved from prior exam.   2.  No lobar pneumonia or overt pulmonary edema.   3.  Double layer calcification of the aortic knob.  Dissection is not excluded.   4.  Prominent central pulmonary vasculature are again seen suggesting pulmonary hypertension.   5.  Stable cardiomegaly.         Assessment/Plan  * GI bleed- (present on admission)  Assessment & Plan  Clear liquids, dancing diet today to full liquid diet; he continues to have bloody stools   serial hemoglobin , over 8 this morning in stable  Gastroenterology consulted anoscopy 9/20.  Culprit likely diverticular bleed  Continue to monitor H&H, transfuse for hemoglobin of less than 7      As patient continues to have bloody stools, concern for internal hemorrhoidal bleed at this time  I will reconsult GI for further recommendations prior to  discharging home.  Recommendations are to add on hydrocortisone 25 mg suppository twice daily x10 days and add 1 tablespoon of Metamucil with each meal.    Look slike patient did not have a BM yesterday, will continue to monitor, if bleeding continues will have to call GI back for hemorroidectomy?    Hypokalemia- (present on admission)  Assessment & Plan  Resolved    Debility- (present on admission)  Assessment & Plan  Multifactorial, age, gastrointestinal bleeding  Physical and occupational therapy evaluation commended skilled nursing facility which I have ordered however patient's daughter does not want her to go to skilled nursing facility and states that she will bring her home with 24-hour assistance    Cognitive impairment  Assessment & Plan  Unclear baseline.  Likely undiagnosed dementia.    Benign essential HTN- (present on admission)  Assessment & Plan  Continue metoprolol     Hypothyroid- (present on admission)  Assessment & Plan  Continue Synthroid       VTE prophylaxis: SCDs, gastrointestinal bleeding    Discussed plan of care with nursing today as patient has history of dementia and is only alert to herself and very hard of hearing.  Monitor  hgb stable

## 2019-09-27 NOTE — CARE PLAN
Problem: Safety  Goal: Will remain free from falls  Outcome: PROGRESSING AS EXPECTED  Intervention: Implement fall precautions  Note:   Reinforced the use of call light when needing assistance. Treaded socks on. Bed in lowest position. Personal belongings within reach. Clutter free environment provided.      Problem: Infection  Goal: Will remain free from infection  Outcome: PROGRESSING AS EXPECTED  Intervention: Implement standard precautions and perform hand washing before and after patient contact  Note:   Reinforced the importance of handwashing and encouraged to do it as possible

## 2019-09-27 NOTE — DISCHARGE PLANNING
This RN CM received patient's POA doc from pt's daughter, Sofia Araiza. Requested MITALI Craft to upload it in Epic.    This RN CM also confirmed with pt's daughter/POA that she is refusing pt to be referred/discharged to a SNF.

## 2019-09-28 LAB
BASOPHILS # BLD AUTO: 0.2 % (ref 0–1.8)
BASOPHILS # BLD: 0.01 K/UL (ref 0–0.12)
EOSINOPHIL # BLD AUTO: 0.05 K/UL (ref 0–0.51)
EOSINOPHIL NFR BLD: 0.9 % (ref 0–6.9)
ERYTHROCYTE [DISTWIDTH] IN BLOOD BY AUTOMATED COUNT: 57.4 FL (ref 35.9–50)
HCT VFR BLD AUTO: 26.2 % (ref 37–47)
HGB BLD-MCNC: 8 G/DL (ref 12–16)
IMM GRANULOCYTES # BLD AUTO: 0.03 K/UL (ref 0–0.11)
IMM GRANULOCYTES NFR BLD AUTO: 0.5 % (ref 0–0.9)
LYMPHOCYTES # BLD AUTO: 1.4 K/UL (ref 1–4.8)
LYMPHOCYTES NFR BLD: 24.7 % (ref 22–41)
MCH RBC QN AUTO: 27.7 PG (ref 27–33)
MCHC RBC AUTO-ENTMCNC: 30.5 G/DL (ref 33.6–35)
MCV RBC AUTO: 90.7 FL (ref 81.4–97.8)
MONOCYTES # BLD AUTO: 0.67 K/UL (ref 0–0.85)
MONOCYTES NFR BLD AUTO: 11.8 % (ref 0–13.4)
NEUTROPHILS # BLD AUTO: 3.51 K/UL (ref 2–7.15)
NEUTROPHILS NFR BLD: 61.9 % (ref 44–72)
NRBC # BLD AUTO: 0 K/UL
NRBC BLD-RTO: 0 /100 WBC
PLATELET # BLD AUTO: 260 K/UL (ref 164–446)
PMV BLD AUTO: 9.6 FL (ref 9–12.9)
RBC # BLD AUTO: 2.89 M/UL (ref 4.2–5.4)
WBC # BLD AUTO: 5.7 K/UL (ref 4.8–10.8)

## 2019-09-28 PROCEDURE — 85025 COMPLETE CBC W/AUTO DIFF WBC: CPT

## 2019-09-28 PROCEDURE — A9270 NON-COVERED ITEM OR SERVICE: HCPCS | Performed by: FAMILY MEDICINE

## 2019-09-28 PROCEDURE — 770006 HCHG ROOM/CARE - MED/SURG/GYN SEMI*

## 2019-09-28 PROCEDURE — 99232 SBSQ HOSP IP/OBS MODERATE 35: CPT | Performed by: HOSPITALIST

## 2019-09-28 PROCEDURE — 700102 HCHG RX REV CODE 250 W/ 637 OVERRIDE(OP): Performed by: FAMILY MEDICINE

## 2019-09-28 PROCEDURE — A9270 NON-COVERED ITEM OR SERVICE: HCPCS | Performed by: HOSPITALIST

## 2019-09-28 PROCEDURE — 700102 HCHG RX REV CODE 250 W/ 637 OVERRIDE(OP): Performed by: HOSPITALIST

## 2019-09-28 PROCEDURE — 36415 COLL VENOUS BLD VENIPUNCTURE: CPT

## 2019-09-28 RX ADMIN — LATANOPROST 1 DROP: 50 SOLUTION OPHTHALMIC at 17:58

## 2019-09-28 RX ADMIN — HYDROCORTISONE ACETATE 25 MG: 25 SUPPOSITORY RECTAL at 18:03

## 2019-09-28 RX ADMIN — METOPROLOL TARTRATE 25 MG: 25 TABLET, FILM COATED ORAL at 05:47

## 2019-09-28 RX ADMIN — METOPROLOL TARTRATE 25 MG: 25 TABLET, FILM COATED ORAL at 17:58

## 2019-09-28 RX ADMIN — PSYLLIUM HUSK 1 PACKET: 3.4 POWDER ORAL at 05:46

## 2019-09-28 RX ADMIN — HYDROCORTISONE ACETATE 25 MG: 25 SUPPOSITORY RECTAL at 05:46

## 2019-09-28 RX ADMIN — POLYVINYL ALCOHOL 1 DROP: 14 SOLUTION/ DROPS OPHTHALMIC at 05:47

## 2019-09-28 RX ADMIN — LEVOTHYROXINE SODIUM 112 MCG: 112 TABLET ORAL at 05:46

## 2019-09-28 ASSESSMENT — ENCOUNTER SYMPTOMS
SEIZURES: 0
ABDOMINAL PAIN: 0
DIARRHEA: 0
SHORTNESS OF BREATH: 0
EYE PAIN: 0
FOCAL WEAKNESS: 0
SPEECH CHANGE: 0
LOSS OF CONSCIOUSNESS: 0
FLANK PAIN: 0
EYE DISCHARGE: 0
SORE THROAT: 0
FEVER: 0
MEMORY LOSS: 1
BRUISES/BLEEDS EASILY: 0
SPUTUM PRODUCTION: 0
PALPITATIONS: 0
STRIDOR: 0
CHILLS: 0
VOMITING: 0
HEMOPTYSIS: 0
EYE REDNESS: 0
COUGH: 0
MYALGIAS: 0

## 2019-09-28 NOTE — PROGRESS NOTES
Assumed care of pt this am. Pt is A&O x3-4 unable to tell RN exactly why she is in the hospital. Bed alarm in use. Pt educated to call for assistance. Pt encouraged to allow staff to bathe her pt verbalized understanding. Hourly rounding in place.

## 2019-09-28 NOTE — CARE PLAN
Problem: Communication  Goal: The ability to communicate needs accurately and effectively will improve  9/28/2019 0354 by Norma Andrade R.N.  Outcome: PROGRESSING AS EXPECTED  9/27/2019 2205 by Norma Andrade R.N.  Outcome: PROGRESSING AS EXPECTED  Intervention: Reorient patient to environment as needed  Note:   Patient is hard of hearing and confused at times. Reorient to time and place when needed,   Intervention: Develop alternate methods of communication with patient and significant other/support system  Note:   Patient is hard of hearing, ensure     Problem: Safety  Goal: Will remain free from falls  Outcome: PROGRESSING AS EXPECTED  Intervention: Implement fall precautions  Note:   Reinforced the use of call light when needing assistance. Treaded socks on. Bed in lowest position. Personal belongings within reach. Clutter free environment provided.

## 2019-09-28 NOTE — CARE PLAN
Problem: Safety  Goal: Will remain free from injury  Outcome: PROGRESSING AS EXPECTED  Intervention: Provide assistance with mobility  Note:   Bed alarm in use. Pt encouraged to call for assistance. Pt verbalizes understanding.      Problem: Skin Integrity  Goal: Risk for impaired skin integrity will decrease  Outcome: PROGRESSING AS EXPECTED  Intervention: Assess risk factors for impaired skin integrity and/or pressure ulcers  Note:   Pt encouraged to sit up at edge of bed throughout day.

## 2019-09-28 NOTE — PROGRESS NOTES
[facility] Medicine Daily Progress Note    Date of Service  9/28/2019    Chief Complaint  88 y.o. female admitted 9/19/2019 with lower gastrointestinal bleeding    Hospital Course      88 years old female with past medical history of colonic polyposis, diverticulosis, admitted 9/19 with lower GI bleeding.  Gastroenterology have been consulted. anoscopy 9/20.  Culprit likely diverticular bleed.  Pt & daughter refusing SNF           Interval Problem Update  Heart rate 70s-90s, blood pressure within normal limits this morning.  Saturating well on room air this morning.  Reportedly last bowel movement 9/27 was within normal limits, no blood.  Hemoglobin 8, down from 8.2, I am ordering iron levels and iron saturation and ferritin.  Alert, variably oriented x1 today   Patient is very weak, patient daughter refused to go to SNF, previous bad experience.  Reports that she can take care of her  Discussed with patient, patient's nurse and charge nurse.      Consultants/Specialty  Gastroenterology    Code Status  Full code    Disposition  Home Vs SNF [refusing SNF]     Review of Systems  Review of Systems   Reason unable to perform ROS: Limitted secondary to dementia    Constitutional: Positive for malaise/fatigue. Negative for chills and fever.   HENT: Negative for congestion and sore throat.    Eyes: Negative for pain, discharge and redness.   Respiratory: Negative for cough, hemoptysis, sputum production, shortness of breath and stridor.    Cardiovascular: Negative for chest pain, palpitations and leg swelling.   Gastrointestinal: Negative for abdominal pain, diarrhea and vomiting.        Passing clots today.   Genitourinary: Negative for flank pain, hematuria and urgency.   Musculoskeletal: Negative for myalgias.   Skin: Negative.    Neurological: Negative for speech change, focal weakness, seizures and loss of consciousness.   Endo/Heme/Allergies: Does not bruise/bleed easily.   Psychiatric/Behavioral: Positive for memory  loss.      Physical Exam  Temp:  [36.3 °C (97.4 °F)-36.7 °C (98.1 °F)] 36.6 °C (97.9 °F)  Pulse:  [77-97] 89  Resp:  [17-18] 17  BP: (129-163)/(61-92) 129/61  SpO2:  [94 %-96 %] 96 %    Physical Exam   Constitutional: No distress.   Elderly, friable   HENT:   Head: Normocephalic and atraumatic.   Right Ear: External ear normal.   Left Ear: External ear normal.   Eyes: Pupils are equal, round, and reactive to light. Conjunctivae are normal. Right eye exhibits no discharge. Left eye exhibits no discharge.   Neck: Normal range of motion. Neck supple. No JVD present. No tracheal deviation present. No thyromegaly present.   Cardiovascular: Exam reveals no gallop and no friction rub.   No murmur heard.  Pulmonary/Chest: Effort normal and breath sounds normal. No stridor. No respiratory distress. She has no wheezes. She has no rales. She exhibits no tenderness.   Abdominal: Soft. Bowel sounds are normal. She exhibits no distension. There is no tenderness. There is no rebound and no guarding.   Musculoskeletal: Normal range of motion. She exhibits no edema, tenderness or deformity.   Neurological: She is alert. No cranial nerve deficit. Coordination normal.   Alert, oriented x1   Skin: Skin is warm and dry. No rash noted. She is not diaphoretic. No erythema. There is pallor.   Psychiatric: She has a normal mood and affect.   Slowed    Nursing note and vitals reviewed.    Fluids    Intake/Output Summary (Last 24 hours) at 9/28/2019 1729  Last data filed at 9/28/2019 1045  Gross per 24 hour   Intake 450 ml   Output --   Net 450 ml       Laboratory  Recent Labs     09/26/19  1018 09/27/19  0943 09/28/19  0220   WBC 5.6 5.7 5.7   RBC 2.77* 2.97* 2.89*   HEMOGLOBIN 8.1* 8.2* 8.0*   HEMATOCRIT 25.8* 27.2* 26.2*   MCV 93.1 91.6 90.7   MCH 29.2 27.6 27.7   MCHC 31.4* 30.1* 30.5*   RDW 59.1* 58.4* 57.4*   PLATELETCT 227 249 260   MPV 9.9 9.1 9.6                       Imaging  NM-GI BLEEDING SCAN   Final Result      Unremarkable scan  for GI bleeding.      CT-ABDOMEN-PELVIS WITH   Final Result      1.  Colonic diverticulosis without evidence for diverticulitis.   2.  No CT evidence for colitis or diverticulitis.      DX-CHEST-PORTABLE (1 VIEW)   Final Result      1.  Mild pulmonary vascular congestion, improved from prior exam.   2.  No lobar pneumonia or overt pulmonary edema.   3.  Double layer calcification of the aortic knob.  Dissection is not excluded.   4.  Prominent central pulmonary vasculature are again seen suggesting pulmonary hypertension.   5.  Stable cardiomegaly.         Assessment/Plan  * GI bleed- (present on admission)  Assessment & Plan  Gastroenterology consulted anoscopy 9/20.  Culprit likely diverticular bleed  Continue to monitor H&H, transfuse for hemoglobin of less than 7     GI reconsulted by previous provider, recommendations are to add on hydrocortisone 25 mg suppository twice daily x10 days and add 1 tablespoon of Metamucil with each meal.     Hypokalemia- (present on admission)  Assessment & Plan  Resolved  Check intermittently, replace as needed    Debility- (present on admission)  Assessment & Plan  Multifactorial, age, gastrointestinal bleeding  Physical and occupational therapy evaluation commended skilled nursing facility, ordered however patient's daughter does not want her to go to skilled nursing facility and states that she will bring her home with 24-hour assistance     Cognitive impairment  Assessment & Plan  Unclear baseline.  Likely undiagnosed dementia.  Patient's daughter want to take care of her, reportedly can provide 24/7 care     Benign essential HTN- (present on admission)  Assessment & Plan  Continue metoprolol     Hypothyroid- (present on admission)  Assessment & Plan  Continue Synthroid        VTE prophylaxis: SCDs, gastrointestinal bleeding

## 2019-09-29 PROBLEM — D64.9 ANEMIA: Status: ACTIVE | Noted: 2019-09-29

## 2019-09-29 LAB
ANION GAP SERPL CALC-SCNC: 4 MMOL/L (ref 0–11.9)
BASOPHILS # BLD AUTO: 0.4 % (ref 0–1.8)
BASOPHILS # BLD: 0.02 K/UL (ref 0–0.12)
BUN SERPL-MCNC: 12 MG/DL (ref 8–22)
CALCIUM SERPL-MCNC: 9.5 MG/DL (ref 8.5–10.5)
CHLORIDE SERPL-SCNC: 101 MMOL/L (ref 96–112)
CO2 SERPL-SCNC: 32 MMOL/L (ref 20–33)
CREAT SERPL-MCNC: 0.47 MG/DL (ref 0.5–1.4)
EOSINOPHIL # BLD AUTO: 0.11 K/UL (ref 0–0.51)
EOSINOPHIL NFR BLD: 2.2 % (ref 0–6.9)
ERYTHROCYTE [DISTWIDTH] IN BLOOD BY AUTOMATED COUNT: 59.1 FL (ref 35.9–50)
FERRITIN SERPL-MCNC: 17.1 NG/ML (ref 10–291)
GLUCOSE SERPL-MCNC: 91 MG/DL (ref 65–99)
HCT VFR BLD AUTO: 24 % (ref 37–47)
HGB BLD-MCNC: 7.4 G/DL (ref 12–16)
IMM GRANULOCYTES # BLD AUTO: 0.01 K/UL (ref 0–0.11)
IMM GRANULOCYTES NFR BLD AUTO: 0.2 % (ref 0–0.9)
IRON SATN MFR SERPL: 7 % (ref 15–55)
IRON SERPL-MCNC: 17 UG/DL (ref 40–170)
LYMPHOCYTES # BLD AUTO: 1.48 K/UL (ref 1–4.8)
LYMPHOCYTES NFR BLD: 30 % (ref 22–41)
MAGNESIUM SERPL-MCNC: 2 MG/DL (ref 1.5–2.5)
MCH RBC QN AUTO: 28.2 PG (ref 27–33)
MCHC RBC AUTO-ENTMCNC: 30.8 G/DL (ref 33.6–35)
MCV RBC AUTO: 91.6 FL (ref 81.4–97.8)
MONOCYTES # BLD AUTO: 0.68 K/UL (ref 0–0.85)
MONOCYTES NFR BLD AUTO: 13.8 % (ref 0–13.4)
NEUTROPHILS # BLD AUTO: 2.64 K/UL (ref 2–7.15)
NEUTROPHILS NFR BLD: 53.4 % (ref 44–72)
NRBC # BLD AUTO: 0 K/UL
NRBC BLD-RTO: 0 /100 WBC
PLATELET # BLD AUTO: 227 K/UL (ref 164–446)
PMV BLD AUTO: 9.3 FL (ref 9–12.9)
POTASSIUM SERPL-SCNC: 4.3 MMOL/L (ref 3.6–5.5)
RBC # BLD AUTO: 2.62 M/UL (ref 4.2–5.4)
SODIUM SERPL-SCNC: 137 MMOL/L (ref 135–145)
TIBC SERPL-MCNC: 259 UG/DL (ref 250–450)
WBC # BLD AUTO: 4.9 K/UL (ref 4.8–10.8)

## 2019-09-29 PROCEDURE — A9270 NON-COVERED ITEM OR SERVICE: HCPCS | Performed by: HOSPITALIST

## 2019-09-29 PROCEDURE — 82728 ASSAY OF FERRITIN: CPT

## 2019-09-29 PROCEDURE — 85025 COMPLETE CBC W/AUTO DIFF WBC: CPT

## 2019-09-29 PROCEDURE — A9270 NON-COVERED ITEM OR SERVICE: HCPCS | Performed by: FAMILY MEDICINE

## 2019-09-29 PROCEDURE — 83735 ASSAY OF MAGNESIUM: CPT

## 2019-09-29 PROCEDURE — 99232 SBSQ HOSP IP/OBS MODERATE 35: CPT | Performed by: HOSPITALIST

## 2019-09-29 PROCEDURE — 700102 HCHG RX REV CODE 250 W/ 637 OVERRIDE(OP): Performed by: HOSPITALIST

## 2019-09-29 PROCEDURE — 770006 HCHG ROOM/CARE - MED/SURG/GYN SEMI*

## 2019-09-29 PROCEDURE — 700105 HCHG RX REV CODE 258: Performed by: HOSPITALIST

## 2019-09-29 PROCEDURE — 36415 COLL VENOUS BLD VENIPUNCTURE: CPT

## 2019-09-29 PROCEDURE — 83540 ASSAY OF IRON: CPT

## 2019-09-29 PROCEDURE — 80048 BASIC METABOLIC PNL TOTAL CA: CPT

## 2019-09-29 PROCEDURE — 700102 HCHG RX REV CODE 250 W/ 637 OVERRIDE(OP): Performed by: FAMILY MEDICINE

## 2019-09-29 PROCEDURE — 83550 IRON BINDING TEST: CPT

## 2019-09-29 PROCEDURE — 700111 HCHG RX REV CODE 636 W/ 250 OVERRIDE (IP): Mod: JG | Performed by: HOSPITALIST

## 2019-09-29 RX ORDER — DIPHENHYDRAMINE HCL 25 MG
25 TABLET ORAL ONCE
Status: COMPLETED | OUTPATIENT
Start: 2019-09-29 | End: 2019-09-29

## 2019-09-29 RX ORDER — DIPHENHYDRAMINE HYDROCHLORIDE 50 MG/ML
25 INJECTION INTRAMUSCULAR; INTRAVENOUS ONCE
Status: COMPLETED | OUTPATIENT
Start: 2019-09-29 | End: 2019-09-29

## 2019-09-29 RX ORDER — ACETAMINOPHEN 325 MG/1
650 TABLET ORAL ONCE
Status: COMPLETED | OUTPATIENT
Start: 2019-09-29 | End: 2019-09-29

## 2019-09-29 RX ORDER — OMEPRAZOLE 20 MG/1
20 CAPSULE, DELAYED RELEASE ORAL 2 TIMES DAILY
Status: DISCONTINUED | OUTPATIENT
Start: 2019-09-29 | End: 2019-10-03 | Stop reason: HOSPADM

## 2019-09-29 RX ADMIN — HYDROCORTISONE ACETATE 25 MG: 25 SUPPOSITORY RECTAL at 05:37

## 2019-09-29 RX ADMIN — POLYVINYL ALCOHOL 1 DROP: 14 SOLUTION/ DROPS OPHTHALMIC at 05:43

## 2019-09-29 RX ADMIN — OMEPRAZOLE 20 MG: 20 CAPSULE, DELAYED RELEASE ORAL at 17:19

## 2019-09-29 RX ADMIN — METOPROLOL TARTRATE 25 MG: 25 TABLET, FILM COATED ORAL at 05:41

## 2019-09-29 RX ADMIN — ACETAMINOPHEN 650 MG: 325 TABLET, FILM COATED ORAL at 09:54

## 2019-09-29 RX ADMIN — LATANOPROST 1 DROP: 50 SOLUTION OPHTHALMIC at 17:19

## 2019-09-29 RX ADMIN — OMEPRAZOLE 20 MG: 20 CAPSULE, DELAYED RELEASE ORAL at 10:18

## 2019-09-29 RX ADMIN — LEVOTHYROXINE SODIUM 112 MCG: 112 TABLET ORAL at 05:41

## 2019-09-29 RX ADMIN — DIPHENHYDRAMINE HYDROCHLORIDE 25 MG: 50 INJECTION INTRAMUSCULAR; INTRAVENOUS at 09:52

## 2019-09-29 RX ADMIN — SODIUM CHLORIDE 25 MG: 9 INJECTION, SOLUTION INTRAVENOUS at 10:15

## 2019-09-29 RX ADMIN — PSYLLIUM HUSK 1 PACKET: 3.4 POWDER ORAL at 05:41

## 2019-09-29 RX ADMIN — METOPROLOL TARTRATE 25 MG: 25 TABLET, FILM COATED ORAL at 17:19

## 2019-09-29 RX ADMIN — SODIUM CHLORIDE 1450 MG: 9 INJECTION, SOLUTION INTRAVENOUS at 14:47

## 2019-09-29 RX ADMIN — HYDROCORTISONE ACETATE 25 MG: 25 SUPPOSITORY RECTAL at 17:18

## 2019-09-29 ASSESSMENT — ENCOUNTER SYMPTOMS
FLANK PAIN: 0
LOSS OF CONSCIOUSNESS: 0
PALPITATIONS: 0
STRIDOR: 0
BLOOD IN STOOL: 1
EYE REDNESS: 0
ABDOMINAL PAIN: 0
HEMOPTYSIS: 0
SEIZURES: 0
MEMORY LOSS: 1
SPEECH CHANGE: 0
CHILLS: 0
SHORTNESS OF BREATH: 0
FEVER: 0
MYALGIAS: 0
COUGH: 0
EYE PAIN: 0
VOMITING: 0
EYE DISCHARGE: 0
SPUTUM PRODUCTION: 0
DIARRHEA: 0
BRUISES/BLEEDS EASILY: 0
SORE THROAT: 0
FOCAL WEAKNESS: 0

## 2019-09-29 NOTE — PROGRESS NOTES
Assumed care of pt this am. Pt is A&O x3, disoriented to event. Pt denies pain. Skin assessed. Pt encouraged to get out of bed today, RN will continue to encourage throughout the day. Hourly rounding in place.

## 2019-09-29 NOTE — CARE PLAN
Problem: Venous Thromboembolism (VTW)/Deep Vein Thrombosis (DVT) Prevention:  Goal: Patient will participate in Venous Thrombosis (VTE)/Deep Vein Thrombosis (DVT)Prevention Measures  Outcome: PROGRESSING AS EXPECTED  Intervention: Assess and monitor for anticoagulation complications  Note:   Pt encouraged to get out of bed or move legs and do foot pumps while in bed pt agreeable at this time.      Problem: Skin Integrity  Goal: Risk for impaired skin integrity will decrease  Outcome: PROGRESSING AS EXPECTED  Note:   Pt mostly turns self throughout day, RN and CNA will turn if pt hasnt turned self within 2 hours.

## 2019-09-29 NOTE — PROGRESS NOTES
IV Iron Per Pharmacy Note    Patient Lean Body Weight:  52 kg  Reason for Iron Replacement: Iron Deficiency       Lab Results   Component Value Date/Time    WBC 4.9 09/29/2019 02:09 AM    RBC 2.62 (L) 09/29/2019 02:09 AM    HEMOGLOBIN 7.4 (L) 09/29/2019 02:09 AM    HEMATOCRIT 24.0 (L) 09/29/2019 02:09 AM    MCV 91.6 09/29/2019 02:09 AM    MCH 28.2 09/29/2019 02:09 AM    MCHC 30.8 (L) 09/29/2019 02:09 AM    MPV 9.3 09/29/2019 02:09 AM       Recent Labs     09/29/19  0209   FERRITIN 17.1   IRON 17*         Recent Labs     09/29/19  0209   CREATININE 0.47*          Assessment/Plan:  1. IV Iron Indicated.   2. Give Iron Dextran 25 mg IV test dose following diphenhydramine/acetaminophen premeds over 30 minutes per protocol.  3. If no reaction (Anaphylaxis, Hypotension/Hypertension, N/V/D, Chest pain/Back Pain, Urticaria/Pruritis) in the next hour, proceed to full dose. Nursing to call the pharmacy IV room at ext. [phone] for full dose.  4. Full dose: Iron Dextran 1450 mg IV over 4 hours. Continue to monitor for delayed ADR including: Arthralgia/myalgia, Headache/backache, chills/dizziness/malaise, moderate to high fever and n/v.      Eileen Willoughby, PharmD

## 2019-09-29 NOTE — PROGRESS NOTES
Assumed care of patient at shift change. Patient is awake, A&O x 3. Continue to have dark stool. No c/o pain or distress. Will continue to monitor.

## 2019-09-29 NOTE — CARE PLAN
Problem: Safety  Goal: Will remain free from injury  Outcome: PROGRESSING AS EXPECTED  Intervention: Provide assistance with mobility  Note:   Bed alarm in use. Pt encouraged to call for assistance. Pt verbalizes understanding.      Problem: Skin Integrity  Goal: Risk for impaired skin integrity will decrease  Outcome: PROGRESSING AS EXPECTED  Intervention: Assess risk factors for impaired skin integrity and/or pressure ulcers  Note:   Pt encouraged to sit up at edge of bed throughout day or in chair at bedside.

## 2019-09-29 NOTE — ASSESSMENT & PLAN NOTE
-acute on chronic and iron deficiency  -rectal/anal bleeding has stopped  -iron replacement per pharmacy kinetics  -Hgb trended and stable  -no acute s/s bleeding

## 2019-09-29 NOTE — PROGRESS NOTES
[facility] Medicine Daily Progress Note    Date of Service  9/29/2019    Chief Complaint  88 y.o. female admitted 9/19/2019 with lower gastrointestinal bleeding    Hospital Course      88 years old female with past medical history of colonic polyposis, diverticulosis, admitted 9/19 with lower GI bleeding.  Gastroenterology have been consulted. anoscopy 9/20.  Culprit likely diverticular bleed.  Pt & daughter refusing SNF     GI Re-consulted 9/26, recommendations are to add on hydrocortisone 25 mg suppository twice daily x10 days and add 1 tablespoon of Metamucil with each meal.             Interval Problem Update  Heart rate 80s-90s.  Blood pressure within normal limits this morning.  Requiring 0-1.5 L of oxygen to achieve adequate saturation, encourage incentive spirometer.    Reportedly passing dark stools per nursing today.  Continue to trend hemoglobin, transfuse for hemoglobin less than 7   Very low iron saturation, I am ordering intravenous iron.  Patient is very weak, patient daughter refused to go to SNF, previous bad experience.  Reports that she can take care of her  Discussed with patient, patient's nurse and charge nurse.      Consultants/Specialty  Gastroenterology    Code Status  Full code    Disposition  Home Vs SNF [refusing SNF]     Review of Systems  Review of Systems   Reason unable to perform ROS: Limitted secondary to dementia    Constitutional: Positive for malaise/fatigue. Negative for chills and fever.   HENT: Negative for congestion and sore throat.    Eyes: Negative for pain, discharge and redness.   Respiratory: Negative for cough, hemoptysis, sputum production, shortness of breath and stridor.    Cardiovascular: Negative for chest pain, palpitations and leg swelling.   Gastrointestinal: Positive for blood in stool. Negative for abdominal pain, diarrhea and vomiting.        Passing clots today.   Genitourinary: Negative for flank pain, hematuria and urgency.   Musculoskeletal: Negative for  myalgias.   Skin: Negative.    Neurological: Negative for speech change, focal weakness, seizures and loss of consciousness.   Endo/Heme/Allergies: Does not bruise/bleed easily.   Psychiatric/Behavioral: Positive for memory loss.      Physical Exam  Temp:  [36.2 °C (97.1 °F)-36.8 °C (98.2 °F)] 36.7 °C (98 °F)  Pulse:  [86-95] 86  Resp:  [16-18] 16  BP: (109-151)/(62-76) 109/62  SpO2:  [90 %-96 %] 96 %    Physical Exam   Constitutional: No distress.   Elderly, friable   HENT:   Head: Normocephalic and atraumatic.   Right Ear: External ear normal.   Left Ear: External ear normal.   Eyes: Pupils are equal, round, and reactive to light. Conjunctivae are normal. Right eye exhibits no discharge. Left eye exhibits no discharge.   Neck: Normal range of motion. Neck supple. No JVD present. No tracheal deviation present. No thyromegaly present.   Cardiovascular: Exam reveals no gallop and no friction rub.   No murmur heard.  Pulmonary/Chest: Effort normal and breath sounds normal. No stridor. No respiratory distress. She has no wheezes. She has no rales. She exhibits no tenderness.   Abdominal: Soft. Bowel sounds are normal. She exhibits no distension. There is no tenderness. There is no rebound and no guarding.   Musculoskeletal: Normal range of motion. She exhibits no edema, tenderness or deformity.   Neurological: She is alert. No cranial nerve deficit. Coordination normal.   Alert, oriented x1   Skin: Skin is warm and dry. No rash noted. She is not diaphoretic. No erythema. There is pallor.   Psychiatric: She has a normal mood and affect.   Slowed    Nursing note and vitals reviewed.    Fluids    Intake/Output Summary (Last 24 hours) at 9/29/2019 1612  Last data filed at 9/29/2019 1030  Gross per 24 hour   Intake 220 ml   Output --   Net 220 ml       Laboratory  Recent Labs     09/27/19  0943 09/28/19  0220 09/29/19  0209   WBC 5.7 5.7 4.9   RBC 2.97* 2.89* 2.62*   HEMOGLOBIN 8.2* 8.0* 7.4*   HEMATOCRIT 27.2* 26.2* 24.0*    MCV 91.6 90.7 91.6   MCH 27.6 27.7 28.2   MCHC 30.1* 30.5* 30.8*   RDW 58.4* 57.4* 59.1*   PLATELETCT 249 260 227   MPV 9.1 9.6 9.3     Recent Labs     09/29/19  0209   SODIUM 137   POTASSIUM 4.3   CHLORIDE 101   CO2 32   GLUCOSE 91   BUN 12   CREATININE 0.47*   CALCIUM 9.5                   Imaging  NM-GI BLEEDING SCAN   Final Result      Unremarkable scan for GI bleeding.      CT-ABDOMEN-PELVIS WITH   Final Result      1.  Colonic diverticulosis without evidence for diverticulitis.   2.  No CT evidence for colitis or diverticulitis.      DX-CHEST-PORTABLE (1 VIEW)   Final Result      1.  Mild pulmonary vascular congestion, improved from prior exam.   2.  No lobar pneumonia or overt pulmonary edema.   3.  Double layer calcification of the aortic knob.  Dissection is not excluded.   4.  Prominent central pulmonary vasculature are again seen suggesting pulmonary hypertension.   5.  Stable cardiomegaly.         Assessment/Plan  * GI bleed- (present on admission)  Assessment & Plan  Gastroenterology consulted anoscopy 9/20.  Culprit likely diverticular bleed  Continue to monitor H&H, transfuse for hemoglobin of less than 7     GI Re-consulted 9/26, recommendations are to add on hydrocortisone 25 mg suppository twice daily x10 days and add 1 tablespoon of Metamucil with each meal.    Consider reconsult GI if the patient continues to bleed     Anemia- (present on admission)  Assessment & Plan  Likely secondary to chronic GI bleeding.  Monitor H&H, transfuse for hemoglobin less than 7.  Intravenous iron 9/29     Hypokalemia- (present on admission)  Assessment & Plan  Resolved  Check intermittently, replace as needed    Debility- (present on admission)  Assessment & Plan  Multifactorial, age, gastrointestinal bleeding  Physical and occupational therapy evaluation commended skilled nursing facility, ordered however patient's daughter does not want her to go to skilled nursing facility and states that she will bring her  home with 24-hour assistance     Cognitive impairment  Assessment & Plan  Unclear baseline.  Likely undiagnosed dementia.  Patient's daughter want to take care of her, reportedly can provide 24/7 care     Benign essential HTN- (present on admission)  Assessment & Plan  Continue metoprolol     Hypothyroid- (present on admission)  Assessment & Plan  Continue Synthroid        VTE prophylaxis: SCDs, gastrointestinal bleeding

## 2019-09-29 NOTE — CARE PLAN
Problem: Safety  Goal: Will remain free from falls  Outcome: PROGRESSING AS EXPECTED  Intervention: Implement fall precautions  Note:   Reinforced the use of call light when needing assistance. Treaded socks on. Bed in lowest position. Personal belongings within reach. Clutter free environment provided.      Problem: Communication  Goal: The ability to communicate needs accurately and effectively will improve  Outcome: PROGRESSING SLOWER THAN EXPECTED  Intervention: Develop alternate methods of communication with patient and significant other/support system  Note:   Patient is hard of hearing, implement alternative ways of communicating with patient such as written communication.

## 2019-09-30 LAB
ANION GAP SERPL CALC-SCNC: 6 MMOL/L (ref 0–11.9)
BASOPHILS # BLD AUTO: 0.3 % (ref 0–1.8)
BASOPHILS # BLD: 0.02 K/UL (ref 0–0.12)
BUN SERPL-MCNC: 9 MG/DL (ref 8–22)
CALCIUM SERPL-MCNC: 9.7 MG/DL (ref 8.5–10.5)
CHLORIDE SERPL-SCNC: 101 MMOL/L (ref 96–112)
CO2 SERPL-SCNC: 30 MMOL/L (ref 20–33)
CREAT SERPL-MCNC: 0.47 MG/DL (ref 0.5–1.4)
EOSINOPHIL # BLD AUTO: 0.07 K/UL (ref 0–0.51)
EOSINOPHIL NFR BLD: 1.2 % (ref 0–6.9)
ERYTHROCYTE [DISTWIDTH] IN BLOOD BY AUTOMATED COUNT: 58.1 FL (ref 35.9–50)
GLUCOSE SERPL-MCNC: 108 MG/DL (ref 65–99)
HCT VFR BLD AUTO: 25.5 % (ref 37–47)
HGB BLD-MCNC: 7.9 G/DL (ref 12–16)
IMM GRANULOCYTES # BLD AUTO: 0.08 K/UL (ref 0–0.11)
IMM GRANULOCYTES NFR BLD AUTO: 1.4 % (ref 0–0.9)
LYMPHOCYTES # BLD AUTO: 1.1 K/UL (ref 1–4.8)
LYMPHOCYTES NFR BLD: 18.7 % (ref 22–41)
MCH RBC QN AUTO: 28.3 PG (ref 27–33)
MCHC RBC AUTO-ENTMCNC: 31 G/DL (ref 33.6–35)
MCV RBC AUTO: 91.4 FL (ref 81.4–97.8)
MONOCYTES # BLD AUTO: 0.63 K/UL (ref 0–0.85)
MONOCYTES NFR BLD AUTO: 10.7 % (ref 0–13.4)
NEUTROPHILS # BLD AUTO: 3.98 K/UL (ref 2–7.15)
NEUTROPHILS NFR BLD: 67.7 % (ref 44–72)
NRBC # BLD AUTO: 0 K/UL
NRBC BLD-RTO: 0 /100 WBC
PLATELET # BLD AUTO: 224 K/UL (ref 164–446)
PMV BLD AUTO: 9.1 FL (ref 9–12.9)
POTASSIUM SERPL-SCNC: 4.3 MMOL/L (ref 3.6–5.5)
RBC # BLD AUTO: 2.79 M/UL (ref 4.2–5.4)
SODIUM SERPL-SCNC: 137 MMOL/L (ref 135–145)
WBC # BLD AUTO: 5.9 K/UL (ref 4.8–10.8)

## 2019-09-30 PROCEDURE — 97535 SELF CARE MNGMENT TRAINING: CPT

## 2019-09-30 PROCEDURE — 99232 SBSQ HOSP IP/OBS MODERATE 35: CPT | Performed by: HOSPITALIST

## 2019-09-30 PROCEDURE — 700102 HCHG RX REV CODE 250 W/ 637 OVERRIDE(OP): Performed by: HOSPITALIST

## 2019-09-30 PROCEDURE — 36415 COLL VENOUS BLD VENIPUNCTURE: CPT

## 2019-09-30 PROCEDURE — A9270 NON-COVERED ITEM OR SERVICE: HCPCS | Performed by: FAMILY MEDICINE

## 2019-09-30 PROCEDURE — A9270 NON-COVERED ITEM OR SERVICE: HCPCS | Performed by: HOSPITALIST

## 2019-09-30 PROCEDURE — 97116 GAIT TRAINING THERAPY: CPT

## 2019-09-30 PROCEDURE — 85025 COMPLETE CBC W/AUTO DIFF WBC: CPT

## 2019-09-30 PROCEDURE — 700102 HCHG RX REV CODE 250 W/ 637 OVERRIDE(OP): Performed by: FAMILY MEDICINE

## 2019-09-30 PROCEDURE — 80048 BASIC METABOLIC PNL TOTAL CA: CPT

## 2019-09-30 PROCEDURE — 770006 HCHG ROOM/CARE - MED/SURG/GYN SEMI*

## 2019-09-30 PROCEDURE — 97530 THERAPEUTIC ACTIVITIES: CPT

## 2019-09-30 RX ADMIN — LEVOTHYROXINE SODIUM 112 MCG: 112 TABLET ORAL at 05:03

## 2019-09-30 RX ADMIN — METOPROLOL TARTRATE 25 MG: 25 TABLET, FILM COATED ORAL at 17:15

## 2019-09-30 RX ADMIN — HYDROCORTISONE ACETATE 25 MG: 25 SUPPOSITORY RECTAL at 17:15

## 2019-09-30 RX ADMIN — HYDROCORTISONE ACETATE 25 MG: 25 SUPPOSITORY RECTAL at 05:02

## 2019-09-30 RX ADMIN — POLYVINYL ALCOHOL 1 DROP: 14 SOLUTION/ DROPS OPHTHALMIC at 05:03

## 2019-09-30 RX ADMIN — OMEPRAZOLE 20 MG: 20 CAPSULE, DELAYED RELEASE ORAL at 05:02

## 2019-09-30 RX ADMIN — PSYLLIUM HUSK 1 PACKET: 3.4 POWDER ORAL at 05:02

## 2019-09-30 RX ADMIN — ACETAMINOPHEN 650 MG: 325 TABLET, FILM COATED ORAL at 09:13

## 2019-09-30 RX ADMIN — OMEPRAZOLE 20 MG: 20 CAPSULE, DELAYED RELEASE ORAL at 17:15

## 2019-09-30 RX ADMIN — LATANOPROST 1 DROP: 50 SOLUTION OPHTHALMIC at 17:15

## 2019-09-30 ASSESSMENT — ENCOUNTER SYMPTOMS
BLOOD IN STOOL: 0
SPEECH CHANGE: 0
DIARRHEA: 0
ABDOMINAL PAIN: 0
SHORTNESS OF BREATH: 0
SPUTUM PRODUCTION: 0
COUGH: 0
LOSS OF CONSCIOUSNESS: 0
FEVER: 0
EYE DISCHARGE: 0
FOCAL WEAKNESS: 0
MEMORY LOSS: 1
VOMITING: 0
STRIDOR: 0
PALPITATIONS: 0
MYALGIAS: 0
BRUISES/BLEEDS EASILY: 0
HEMOPTYSIS: 0
SORE THROAT: 0
SEIZURES: 0
CHILLS: 0
EYE REDNESS: 0
FLANK PAIN: 0
EYE PAIN: 0

## 2019-09-30 ASSESSMENT — GAIT ASSESSMENTS
GAIT LEVEL OF ASSIST: STAND BY ASSIST
DISTANCE (FEET): 15
ASSISTIVE DEVICE: FRONT WHEEL WALKER
DEVIATION: BRADYKINETIC;SHUFFLED GAIT;STEP TO

## 2019-09-30 ASSESSMENT — COGNITIVE AND FUNCTIONAL STATUS - GENERAL
STANDING UP FROM CHAIR USING ARMS: A LITTLE
TURNING FROM BACK TO SIDE WHILE IN FLAT BAD: A LITTLE
SUGGESTED CMS G CODE MODIFIER MOBILITY: CK
MOBILITY SCORE: 16
CLIMB 3 TO 5 STEPS WITH RAILING: A LOT
WALKING IN HOSPITAL ROOM: A LOT
MOVING TO AND FROM BED TO CHAIR: A LITTLE
MOVING FROM LYING ON BACK TO SITTING ON SIDE OF FLAT BED: A LITTLE

## 2019-09-30 NOTE — PROGRESS NOTES
Alert and oriented x4. Offered fluids and snacks. Safety precautions in placed. Bed in lowest position. Upper side rails up. Treaded socks on. Reinforced the use of call light when needing assistance.

## 2019-09-30 NOTE — CARE PLAN
Problem: Communication  Goal: The ability to communicate needs accurately and effectively will improve  Outcome: PROGRESSING AS EXPECTED     Problem: Safety  Goal: Will remain free from injury  Outcome: PROGRESSING AS EXPECTED     Problem: Pain Management  Goal: Pain level will decrease to patient's comfort goal  Outcome: PROGRESSING AS EXPECTED

## 2019-09-30 NOTE — THERAPY
"Physical Therapy Treatment completed.   Bed Mobility:  Supine to Sit: (up to a chair upon entering )  Transfers: Sit to Stand: Stand by Assist  Gait: Level Of Assist: Stand by Assist with Front-Wheel Walker       Plan of Care: Patient with no further skilled PT needs in the acute care setting at this time  Discharge Recommendations: Equipment: No Equipment Needed. Post-acute therapy : see below    See \"Rehab Therapy-Acute\" Patient Summary Report for complete documentation.     Pt was able to participate in increased mobility today, but continues to be limited by poor cognition and self limiting behavior.  pt was found up in chair upon entering room, and requested to get up to BSC.  She was able to use FWW and walk around the bed to BSC well, but did end up urinating on floor.  Extended period of time taken as Pt requested 2 more additional trips back to the commode after transferring off of it as she felt she still needed \"to go.\"  Pt appears to be at her functional baseline at this time and has been consistent with NRSG as well at this mobility level.  Pt with no further acute skilled PT needs at this time, family would like to take Pt home as they refuse post acute placement.  Recommend Pt have 24/7 supervision and assistance with any and all mobility as her cog status makes her a high fall risk.  PT to be available for DC needs only.  "

## 2019-09-30 NOTE — PROGRESS NOTES
Hospital Medicine Daily Progress Note    Date of Service  9/30/2019    Chief Complaint  88 y.o. female admitted 9/19/2019 with lower gastrointestinal bleeding    Hospital Course      88 years old female with past medical history of colonic polyposis, diverticulosis, admitted 9/19 with lower GI bleeding.  Gastroenterology have been consulted. anoscopy 9/20.  Culprit likely diverticular bleed.  Pt & daughter refusing SNF     GI Re-consulted 9/26, recommendations are to add on hydrocortisone 25 mg suppository twice daily x10 days and add 1 tablespoon of Metamucil with each meal.             Interval Problem Update  Heart rate 80s-90s.  Requiring 1.5 -2 L of oxygen to achieve adequate saturation, encourage incentive spirometer.    Last bowel movement reportedly without blood.  Hemoglobin 7.9    Got intravenous iron.  Patient is very weak, patient daughter refused to go to SNF, previous bad experience.  Reports that she can take care of her.  I am requesting repeat PT/OT evaluation to determine safety for discharge.   Discussed with patient's nurse and with multidisciplinary team during rounds including , and charge nurse.      Consultants/Specialty  Gastroenterology    Code Status  Full code     Disposition  Home w HH Vs SNF [refusing SNF],, pending updated PT OT notes    Review of Systems  Review of Systems   Reason unable to perform ROS: Limitted secondary to dementia    Constitutional: Positive for malaise/fatigue. Negative for chills and fever.   HENT: Negative for congestion and sore throat.    Eyes: Negative for pain, discharge and redness.   Respiratory: Negative for cough, hemoptysis, sputum production, shortness of breath and stridor.    Cardiovascular: Negative for chest pain, palpitations and leg swelling.   Gastrointestinal: Negative for abdominal pain, blood in stool, diarrhea and vomiting.        Passing clots today.   Genitourinary: Negative for flank pain, hematuria and urgency.    Musculoskeletal: Negative for myalgias.   Skin: Negative.    Neurological: Negative for speech change, focal weakness, seizures and loss of consciousness.   Endo/Heme/Allergies: Does not bruise/bleed easily.   Psychiatric/Behavioral: Positive for memory loss.      Physical Exam  Temp:  [36.2 °C (97.2 °F)-36.9 °C (98.5 °F)] 36.7 °C (98.1 °F)  Pulse:  [85-93] 87  Resp:  [14-16] 14  BP: ()/(56-81) 121/64  SpO2:  [95 %-99 %] 99 %    Physical Exam   Constitutional: No distress.   Elderly, friable   HENT:   Head: Normocephalic and atraumatic.   Right Ear: External ear normal.   Left Ear: External ear normal.   Eyes: Pupils are equal, round, and reactive to light. Conjunctivae are normal. Right eye exhibits no discharge. Left eye exhibits no discharge.   Neck: Normal range of motion. Neck supple. No JVD present. No tracheal deviation present. No thyromegaly present.   Cardiovascular: Exam reveals no gallop and no friction rub.   No murmur heard.  Pulmonary/Chest: Effort normal and breath sounds normal. No stridor. No respiratory distress. She has no wheezes. She has no rales. She exhibits no tenderness.   Abdominal: Soft. Bowel sounds are normal. She exhibits no distension. There is no tenderness. There is no rebound and no guarding.   Musculoskeletal: Normal range of motion. She exhibits no edema, tenderness or deformity.   Neurological: She is alert. No cranial nerve deficit. Coordination normal.   Alert, oriented x1   Skin: Skin is warm and dry. No rash noted. She is not diaphoretic. No erythema. There is pallor.   Psychiatric: She has a normal mood and affect.   Slowed    Nursing note and vitals reviewed.    Fluids    Intake/Output Summary (Last 24 hours) at 9/30/2019 1908  Last data filed at 9/30/2019 1511  Gross per 24 hour   Intake 1290 ml   Output --   Net 1290 ml       Laboratory  Recent Labs     09/28/19  0220 09/29/19  0209 09/30/19  1134   WBC 5.7 4.9 5.9   RBC 2.89* 2.62* 2.79*   HEMOGLOBIN 8.0* 7.4*  7.9*   HEMATOCRIT 26.2* 24.0* 25.5*   MCV 90.7 91.6 91.4   MCH 27.7 28.2 28.3   MCHC 30.5* 30.8* 31.0*   RDW 57.4* 59.1* 58.1*   PLATELETCT 260 227 224   MPV 9.6 9.3 9.1     Recent Labs     09/29/19  0209 09/30/19  1134   SODIUM 137 137   POTASSIUM 4.3 4.3   CHLORIDE 101 101   CO2 32 30   GLUCOSE 91 108*   BUN 12 9   CREATININE 0.47* 0.47*   CALCIUM 9.5 9.7                   Imaging  NM-GI BLEEDING SCAN   Final Result      Unremarkable scan for GI bleeding.      CT-ABDOMEN-PELVIS WITH   Final Result      1.  Colonic diverticulosis without evidence for diverticulitis.   2.  No CT evidence for colitis or diverticulitis.      DX-CHEST-PORTABLE (1 VIEW)   Final Result      1.  Mild pulmonary vascular congestion, improved from prior exam.   2.  No lobar pneumonia or overt pulmonary edema.   3.  Double layer calcification of the aortic knob.  Dissection is not excluded.   4.  Prominent central pulmonary vasculature are again seen suggesting pulmonary hypertension.   5.  Stable cardiomegaly.         Assessment/Plan  * GI bleed- (present on admission)  Assessment & Plan  Gastroenterology consulted anoscopy 9/20.  Culprit likely diverticular bleed  Continue to monitor H&H, transfuse for hemoglobin of less than 7     GI Re-consulted 9/26, recommendations are to add on hydrocortisone 25 mg suppository twice daily x10 days and add 1 tablespoon of Metamucil with each meal.    Consider reconsult GI if the patient continues to bleed   9/30 reportedly normal bowel movements today    Anemia- (present on admission)  Assessment & Plan  Likely secondary to chronic GI bleeding.  Monitor H&H, transfuse for hemoglobin less than 7.  Intravenous iron 9/29     Hypokalemia- (present on admission)  Assessment & Plan  Resolved  Check intermittently, replace as needed    Debility- (present on admission)  Assessment & Plan  Multifactorial, age, gastrointestinal bleeding  Physical and occupational therapy evaluation commended skilled nursing  facility, ordered however patient's daughter does not want her to go to skilled nursing facility and states that she will bring her home with 24-hour assistance   I am requesting repeat PT/OT evaluation to determine safety for discharge.      Cognitive impairment  Assessment & Plan  Unclear baseline.  Likely undiagnosed dementia.  Patient's daughter want to take care of her, reportedly can provide 24/7 care     Benign essential HTN- (present on admission)  Assessment & Plan  Continue metoprolol     Hypothyroid- (present on admission)  Assessment & Plan  Continue Synthroid        VTE prophylaxis: SCDs, gastrointestinal bleeding

## 2019-10-01 PROBLEM — H92.02 LEFT EAR PAIN: Status: ACTIVE | Noted: 2019-10-01

## 2019-10-01 PROBLEM — R54 AGE-RELATED PHYSICAL DEBILITY: Status: ACTIVE | Noted: 2019-09-20

## 2019-10-01 LAB
BASOPHILS # BLD AUTO: 0.3 % (ref 0–1.8)
BASOPHILS # BLD: 0.02 K/UL (ref 0–0.12)
EOSINOPHIL # BLD AUTO: 0.1 K/UL (ref 0–0.51)
EOSINOPHIL NFR BLD: 1.6 % (ref 0–6.9)
ERYTHROCYTE [DISTWIDTH] IN BLOOD BY AUTOMATED COUNT: 59 FL (ref 35.9–50)
HCT VFR BLD AUTO: 25.3 % (ref 37–47)
HGB BLD-MCNC: 7.9 G/DL (ref 12–16)
IMM GRANULOCYTES # BLD AUTO: 0.03 K/UL (ref 0–0.11)
IMM GRANULOCYTES NFR BLD AUTO: 0.5 % (ref 0–0.9)
LYMPHOCYTES # BLD AUTO: 1.96 K/UL (ref 1–4.8)
LYMPHOCYTES NFR BLD: 31.7 % (ref 22–41)
MCH RBC QN AUTO: 28.7 PG (ref 27–33)
MCHC RBC AUTO-ENTMCNC: 31.2 G/DL (ref 33.6–35)
MCV RBC AUTO: 92 FL (ref 81.4–97.8)
MONOCYTES # BLD AUTO: 0.81 K/UL (ref 0–0.85)
MONOCYTES NFR BLD AUTO: 13.1 % (ref 0–13.4)
NEUTROPHILS # BLD AUTO: 3.26 K/UL (ref 2–7.15)
NEUTROPHILS NFR BLD: 52.8 % (ref 44–72)
NRBC # BLD AUTO: 0.02 K/UL
NRBC BLD-RTO: 0.3 /100 WBC
PLATELET # BLD AUTO: 247 K/UL (ref 164–446)
PMV BLD AUTO: 9.2 FL (ref 9–12.9)
RBC # BLD AUTO: 2.75 M/UL (ref 4.2–5.4)
WBC # BLD AUTO: 6.2 K/UL (ref 4.8–10.8)

## 2019-10-01 PROCEDURE — 700102 HCHG RX REV CODE 250 W/ 637 OVERRIDE(OP): Performed by: HOSPITALIST

## 2019-10-01 PROCEDURE — 700102 HCHG RX REV CODE 250 W/ 637 OVERRIDE(OP): Performed by: FAMILY MEDICINE

## 2019-10-01 PROCEDURE — A9270 NON-COVERED ITEM OR SERVICE: HCPCS | Performed by: HOSPITALIST

## 2019-10-01 PROCEDURE — 99232 SBSQ HOSP IP/OBS MODERATE 35: CPT | Performed by: INTERNAL MEDICINE

## 2019-10-01 PROCEDURE — 700102 HCHG RX REV CODE 250 W/ 637 OVERRIDE(OP): Performed by: NURSE PRACTITIONER

## 2019-10-01 PROCEDURE — 770006 HCHG ROOM/CARE - MED/SURG/GYN SEMI*

## 2019-10-01 PROCEDURE — A9270 NON-COVERED ITEM OR SERVICE: HCPCS | Performed by: NURSE PRACTITIONER

## 2019-10-01 PROCEDURE — A9270 NON-COVERED ITEM OR SERVICE: HCPCS | Performed by: FAMILY MEDICINE

## 2019-10-01 PROCEDURE — 85025 COMPLETE CBC W/AUTO DIFF WBC: CPT

## 2019-10-01 PROCEDURE — 36415 COLL VENOUS BLD VENIPUNCTURE: CPT

## 2019-10-01 RX ADMIN — LEVOTHYROXINE SODIUM 112 MCG: 112 TABLET ORAL at 05:55

## 2019-10-01 RX ADMIN — POLYVINYL ALCOHOL 1 DROP: 14 SOLUTION/ DROPS OPHTHALMIC at 05:53

## 2019-10-01 RX ADMIN — METOPROLOL TARTRATE 25 MG: 25 TABLET, FILM COATED ORAL at 05:55

## 2019-10-01 RX ADMIN — CARBAMIDE PEROXIDE 6.5% 5 DROP: 6.5 LIQUID AURICULAR (OTIC) at 18:14

## 2019-10-01 RX ADMIN — OMEPRAZOLE 20 MG: 20 CAPSULE, DELAYED RELEASE ORAL at 18:08

## 2019-10-01 RX ADMIN — LATANOPROST 1 DROP: 50 SOLUTION OPHTHALMIC at 18:08

## 2019-10-01 RX ADMIN — HYDROCORTISONE ACETATE 25 MG: 25 SUPPOSITORY RECTAL at 18:08

## 2019-10-01 RX ADMIN — PSYLLIUM HUSK 1 PACKET: 3.4 POWDER ORAL at 05:54

## 2019-10-01 RX ADMIN — HYDROCORTISONE ACETATE 25 MG: 25 SUPPOSITORY RECTAL at 05:50

## 2019-10-01 RX ADMIN — OMEPRAZOLE 20 MG: 20 CAPSULE, DELAYED RELEASE ORAL at 05:55

## 2019-10-01 ASSESSMENT — ENCOUNTER SYMPTOMS
HEADACHES: 0
NAUSEA: 0
DIZZINESS: 0

## 2019-10-01 NOTE — DIETARY
Nutrition Services: Update   Day 12 of admit.  Марина Giron is a 88 y.o. female with admitting DX of GIB (gastrointestinal bleeding)    Pt is currently on Regular diet. Pt was receiving Full Liquid diet with variable PO intake recorded from 0% to %.  Pt receiving Boost Plus with meals.  D/W APRN and diet advanced today to Regular. Expect appetite may improve with more variety in diet.  Wt 9/28: 77.4 kg but no scale recorded - Previous weight on 9/19 was 81 kg on standing scale.     Malnutrition Risk: No criteria met at this time.    Recommendations/Plan:  1. Continue Boost Plus with meals.   2. Encourage intake of at least 50% of meals and supplements.  3. Document intake of all meals and supplements as % taken in ADL's to provide interdisciplinary communication across all shifts.   4. Monitor weight.  5. Nutrition rep will continue to see patient for ongoing meal and snack preferences.    RD following

## 2019-10-01 NOTE — CARE PLAN
Problem: Nutritional:  Goal: Achieve adequate nutritional intake  Description  Patient will tolerate advanced diet and consume 50% of meals.   Outcome: PROGRESSING AS EXPECTED   See RD Note

## 2019-10-01 NOTE — FACE TO FACE
Face to Face Supporting Documentation - Home Health    The encounter with this patient was in whole or in part the primary reason for home health admission.    Date of encounter:   Patient:                    MRN:                       YOB: 2019  Марина Giron  [id number]  4/8/1931     Home health to see patient for:  Skilled Nursing care for assessment, interventions & education, Physical Therapy evaluation and treatment and Occupational therapy evaluation and treatment    Skilled need for:  New Onset Medical Diagnosis Rectal Bleeding and Recent Deterioration of Health Status Mental acuity/Cognitive Impairment    Skilled nursing interventions to include:  Comment: Assessments and medication management    Homebound status evidenced by:  Need the aid of supportive devices such as crutches, canes, wheelchairs or walkers or Needs the assistance of another person in order to leave the home. Leaving home requires a considerable and taxing effort. There is a normal inability to leave the home.    Community Physician to provide follow up care: Pcp Not In Computer     Optional Interventions? No      I certify the face to face encounter for this home health care referral meets the CMS requirements and the encounter/clinical assessment with the patient was, in whole, or in part, for the medical condition(s) listed above, which is the primary reason for home health care. Based on my clinical findings: the service(s) are medically necessary, support the need for home health care, and the homebound criteria are met.  I certify that this patient has had a face to face encounter by myself.  DANIEL Oconnell. - NPI: [id number]

## 2019-10-01 NOTE — CARE PLAN
Problem: Safety  Goal: Will remain free from falls  Note:   Ensure high risk fall precautions remain in place; bed alarm on and set to zero second delay, bed in lowest and locked position, pt wearing non skid socks and call light and personal items within reach.      Problem: Venous Thromboembolism (VTW)/Deep Vein Thrombosis (DVT) Prevention:  Goal: Patient will participate in Venous Thrombosis (VTE)/Deep Vein Thrombosis (DVT)Prevention Measures  Intervention: Ensure patient wears graduated elastic stockings (DANNA hose) and/or SCDs, if ordered, when in bed or chair (Remove at least once per shift for skin check)  Note:   Placed SCD's on pt's BLE as ordered.

## 2019-10-01 NOTE — PROGRESS NOTES
Patient was OOB to the chair for lunch today and then was able to work with PT getting back to bed. C/o mild headache in the morning which resolved with PRN tylenol. Patient has not exhibited any bloody stools today, hgb up to 7.9.

## 2019-10-01 NOTE — DISCHARGE PLANNING
.This RN CM left a voicemail to pt's daughter Sofia Araiza/KORI to inform her that there is a referral order for pt for Home Health. Waiting for Sofia to call back for her consent.     This RN CM also contacted [facility] and was told that they cover [place], [place] but they are fully booked until the middle of next week.    13:30 Spoke with TERENCE Rosas and she states we will not proceed with Home Health because pt's daughter/POA Sofia Araiza also refused Home Health.    Sofia Araiza, Pt's daughter to provide transportation to pt back to home.

## 2019-10-01 NOTE — PROGRESS NOTES
Hospital Medicine Daily Progress Note    Date of Service  10/1/2019    Chief Complaint  Bloody stools    Hospital Course   This is an 88-year-old female with PMH rectal bleeding, HTN, HLD and cognitive impairment who presented 9/19/2019 with bloody stools.  She was hospitalized here in May and had endoscopy and colonoscopy which showed erosive gastroduodenitis, multiple colon polyps, diverticulosis and internal hemorrhoids and was started on PPI therapy.  She had not had any bleeding since then.  Day prior to presentation she took Aleve for headache and noted bloody stools afterwards.  GI was consulted on 920 and recommended nuclear medicine tagged RBC scan which was unremarkable for GI bleeding.  She had ongoing rectal bleeding and GI was reconsulted on 926 and recommended adding hydrocortisone suppository twice daily x10 days and 1 tablespoon of Metamucil with each meal.  Her hemoglobins were trended and remained stable.  She did not require blood transfusion this hospitalization.      Interval Problem Update  -Patient complains of left ear pain.  Daughter reports patient has tendency for earwax buildup. I have ordered twice daily eardrops.    Consultants/Specialty  -Gastroenterology -signed off    Code Status  DNAR/DNI    Disposition  Plan to DC home with daughter on Thursday, October 3rd    Review of Systems  Review of Systems   Constitutional: Positive for malaise/fatigue.        Limited due to [place] and dementia.     HENT: Positive for ear pain.    Cardiovascular: Negative for chest pain.   Gastrointestinal: Negative for nausea.   Neurological: Negative for dizziness and headaches.        Physical Exam  Temp:  [36.5 °C (97.7 °F)-36.9 °C (98.4 °F)] 36.9 °C (98.4 °F)  Pulse:  [82-92] 87  Resp:  [14-18] 15  BP: (113-139)/(60-74) 113/60  SpO2:  [92 %-93 %] 92 %    Physical Exam   Constitutional: Vital signs are normal. She appears well-developed and well-nourished. She is cooperative.  Non-toxic appearance. No  distress.   HENT:   Head: Normocephalic and atraumatic.   Right Ear: External ear normal.   Left Ear: External ear normal.   Eyes: Pupils are equal, round, and reactive to light. Conjunctivae are normal. Right eye exhibits no discharge. Left eye exhibits no discharge.   Neck: Normal range of motion. Neck supple. No JVD present. No tracheal deviation present. No thyromegaly present.   Cardiovascular: Normal rate and normal pulses.   Pulmonary/Chest: Effort normal and breath sounds normal. No stridor. She has no wheezes. She has no rhonchi. She exhibits no tenderness.   Abdominal: Soft. Normal appearance and bowel sounds are normal. She exhibits no distension. There is no tenderness. There is no rebound and no guarding.   Musculoskeletal: Normal range of motion. She exhibits no edema, tenderness or deformity.   Generalized weakness   Neurological: She is alert. She is disoriented.   Alert, oriented x1   Skin: Skin is warm and dry. There is pallor.   Psychiatric: She has a normal mood and affect.   Slowed    Nursing note and vitals reviewed.      Fluids    Intake/Output Summary (Last 24 hours) at 10/1/2019 1714  Last data filed at 10/1/2019 0830  Gross per 24 hour   Intake 720 ml   Output --   Net 720 ml       Laboratory  Recent Labs     09/29/19  0209 09/30/19  1134 10/01/19  0357   WBC 4.9 5.9 6.2   RBC 2.62* 2.79* 2.75*   HEMOGLOBIN 7.4* 7.9* 7.9*   HEMATOCRIT 24.0* 25.5* 25.3*   MCV 91.6 91.4 92.0   MCH 28.2 28.3 28.7   MCHC 30.8* 31.0* 31.2*   RDW 59.1* 58.1* 59.0*   PLATELETCT 227 224 247   MPV 9.3 9.1 9.2     Recent Labs     09/29/19  0209 09/30/19  1134   SODIUM 137 137   POTASSIUM 4.3 4.3   CHLORIDE 101 101   CO2 32 30   GLUCOSE 91 108*   BUN 12 9   CREATININE 0.47* 0.47*   CALCIUM 9.5 9.7                   Imaging  NM-GI BLEEDING SCAN   Final Result      Unremarkable scan for GI bleeding.      CT-ABDOMEN-PELVIS WITH   Final Result      1.  Colonic diverticulosis without evidence for diverticulitis.   2.  No  "CT evidence for colitis or diverticulitis.      DX-CHEST-PORTABLE (1 VIEW)   Final Result      1.  Mild pulmonary vascular congestion, improved from prior exam.   2.  No lobar pneumonia or overt pulmonary edema.   3.  Double layer calcification of the aortic knob.  Dissection is not excluded.   4.  Prominent central pulmonary vasculature are again seen suggesting pulmonary hypertension.   5.  Stable cardiomegaly.           Assessment/Plan  * GI bleed- (present on admission)  Assessment & Plan  -GI consulted - s/p anoscopy 9/20.  Culprit likely diverticular bleed  -GI re-consult 9/26 - recommended  hydrocortisone 25 mg suppository twice daily x10 days and add 1 tablespoon of Metamucil with each meal.    -trending Hgb - stable. Transfuse for Hgb <7  -normal BM 9/30      Anemia- (present on admission)  Assessment & Plan  -acute on chronic and iron deficiency  -rectal/anal bleeding has stopped  -iron replacement per pharmacy kinetics  -Hgb trended and stable  -no acute s/s bleeding      Hypokalemia- (present on admission)  Assessment & Plan  -resolved    Age-related physical debility- (present on admission)  Assessment & Plan  -Multifactorial due to age, worsening cognitive impairment and GI bleed  -Fall precautions  -PT/OT.  Plan was to send home with home health, however daughter declines stating she has resources to care for patient 24/7 per PT recommendations  -Dietary supplements 3 times daily  -Encourage p.o. Intake  -Frequent reorientation  -Avoid benzos, anticholinergics and narcotics  -Bowel protocol        Cognitive impairment- (present on admission)  Assessment & Plan  -Per daughter patient is at her baseline mentation  -Please see \"age-related debility\"      Benign essential HTN- (present on admission)  Assessment & Plan  -139/70 this morning  -Continue metoprolol     Left ear pain- (present on admission)  Assessment & Plan  -daughter reports patient has been complaining of ear pain for over a week " now  -5-10 drops Debrox BID for up to 4 days    Hypothyroid- (present on admission)  Assessment & Plan  -chronic   -Continue Synthroid          VTE prophylaxis: SCD and ambulation.  Pharmacologic DVT prophylaxis contraindicated due to GI bleed    DANIEL Oconnell.

## 2019-10-01 NOTE — ASSESSMENT & PLAN NOTE
-daughter reports patient has been complaining of ear pain for over a week now. Patient reports much improvement today  -5-10 drops Debrox BID for one more day - do not anticipate DC with RX   -FU OP ENT

## 2019-10-01 NOTE — PROGRESS NOTES
Received report and assumed pt care at 1900 change of shift.  Pt on 2 LPM oxygen via nasal cannula with no complaints of pain.  Pt stood and pivoted to commode with 2 person assist for bowel movement of normal brown and tan color; no visual blood noted.

## 2019-10-02 LAB
BASOPHILS # BLD AUTO: 0.3 % (ref 0–1.8)
BASOPHILS # BLD: 0.02 K/UL (ref 0–0.12)
EOSINOPHIL # BLD AUTO: 0.07 K/UL (ref 0–0.51)
EOSINOPHIL NFR BLD: 1 % (ref 0–6.9)
ERYTHROCYTE [DISTWIDTH] IN BLOOD BY AUTOMATED COUNT: 59.9 FL (ref 35.9–50)
HCT VFR BLD AUTO: 24.3 % (ref 37–47)
HGB BLD-MCNC: 7.4 G/DL (ref 12–16)
IMM GRANULOCYTES # BLD AUTO: 0.04 K/UL (ref 0–0.11)
IMM GRANULOCYTES NFR BLD AUTO: 0.6 % (ref 0–0.9)
LYMPHOCYTES # BLD AUTO: 1.89 K/UL (ref 1–4.8)
LYMPHOCYTES NFR BLD: 27.3 % (ref 22–41)
MCH RBC QN AUTO: 28.2 PG (ref 27–33)
MCHC RBC AUTO-ENTMCNC: 30.5 G/DL (ref 33.6–35)
MCV RBC AUTO: 92.7 FL (ref 81.4–97.8)
MONOCYTES # BLD AUTO: 0.86 K/UL (ref 0–0.85)
MONOCYTES NFR BLD AUTO: 12.4 % (ref 0–13.4)
NEUTROPHILS # BLD AUTO: 4.04 K/UL (ref 2–7.15)
NEUTROPHILS NFR BLD: 58.4 % (ref 44–72)
NRBC # BLD AUTO: 0.03 K/UL
NRBC BLD-RTO: 0.4 /100 WBC
PLATELET # BLD AUTO: 235 K/UL (ref 164–446)
PMV BLD AUTO: 9.3 FL (ref 9–12.9)
RBC # BLD AUTO: 2.62 M/UL (ref 4.2–5.4)
WBC # BLD AUTO: 6.9 K/UL (ref 4.8–10.8)

## 2019-10-02 PROCEDURE — 700102 HCHG RX REV CODE 250 W/ 637 OVERRIDE(OP): Performed by: HOSPITALIST

## 2019-10-02 PROCEDURE — 700102 HCHG RX REV CODE 250 W/ 637 OVERRIDE(OP): Performed by: FAMILY MEDICINE

## 2019-10-02 PROCEDURE — A9270 NON-COVERED ITEM OR SERVICE: HCPCS | Performed by: HOSPITALIST

## 2019-10-02 PROCEDURE — 99232 SBSQ HOSP IP/OBS MODERATE 35: CPT | Performed by: INTERNAL MEDICINE

## 2019-10-02 PROCEDURE — A9270 NON-COVERED ITEM OR SERVICE: HCPCS | Performed by: FAMILY MEDICINE

## 2019-10-02 PROCEDURE — 770006 HCHG ROOM/CARE - MED/SURG/GYN SEMI*

## 2019-10-02 PROCEDURE — 85025 COMPLETE CBC W/AUTO DIFF WBC: CPT

## 2019-10-02 PROCEDURE — 36415 COLL VENOUS BLD VENIPUNCTURE: CPT

## 2019-10-02 RX ADMIN — LEVOTHYROXINE SODIUM 112 MCG: 112 TABLET ORAL at 04:56

## 2019-10-02 RX ADMIN — CARBAMIDE PEROXIDE 6.5% 10 DROP: 6.5 LIQUID AURICULAR (OTIC) at 04:57

## 2019-10-02 RX ADMIN — OMEPRAZOLE 20 MG: 20 CAPSULE, DELAYED RELEASE ORAL at 17:42

## 2019-10-02 RX ADMIN — OMEPRAZOLE 20 MG: 20 CAPSULE, DELAYED RELEASE ORAL at 04:56

## 2019-10-02 RX ADMIN — LATANOPROST 1 DROP: 50 SOLUTION OPHTHALMIC at 17:43

## 2019-10-02 RX ADMIN — PSYLLIUM HUSK 1 PACKET: 3.4 POWDER ORAL at 07:48

## 2019-10-02 RX ADMIN — CARBAMIDE PEROXIDE 6.5% 5 DROP: 6.5 LIQUID AURICULAR (OTIC) at 17:43

## 2019-10-02 RX ADMIN — HYDROCORTISONE ACETATE 25 MG: 25 SUPPOSITORY RECTAL at 20:28

## 2019-10-02 RX ADMIN — METOPROLOL TARTRATE 25 MG: 25 TABLET, FILM COATED ORAL at 17:42

## 2019-10-02 RX ADMIN — HYDROCORTISONE ACETATE 25 MG: 25 SUPPOSITORY RECTAL at 04:55

## 2019-10-02 RX ADMIN — METOPROLOL TARTRATE 25 MG: 25 TABLET, FILM COATED ORAL at 04:55

## 2019-10-02 RX ADMIN — POLYVINYL ALCOHOL 1 DROP: 14 SOLUTION/ DROPS OPHTHALMIC at 04:56

## 2019-10-02 ASSESSMENT — ENCOUNTER SYMPTOMS
NAUSEA: 0
DIZZINESS: 0
HEADACHES: 0

## 2019-10-02 NOTE — CARE PLAN
Problem: Communication  Goal: The ability to communicate needs accurately and effectively will improve  Note:   Ensure hard of hearing confused pt hears and understands conversation by having pt repeat pertinent information.     Problem: Venous Thromboembolism (VTW)/Deep Vein Thrombosis (DVT) Prevention:  Goal: Patient will participate in Venous Thrombosis (VTE)/Deep Vein Thrombosis (DVT)Prevention Measures  Note:   SCD's placed on pt to assist in anticoagulate prophylaxis.

## 2019-10-02 NOTE — PROGRESS NOTES
2pa to BSC. Incontinent of urine while ambulating. Patient is alert, [place] but oriented. Stool is hard and debora colored with minimal amount of flecks of blood. Patient pale for this RN. Assessment as charted. Denies pain. 1pa with steady gait from BSC to chair. Non slip footwear in place along with chair alarm. Call light and phone within reach.

## 2019-10-02 NOTE — CARE PLAN
Problem: Communication  Goal: The ability to communicate needs accurately and effectively will improve  Outcome: PROGRESSING AS EXPECTED     Problem: Safety  Goal: Will remain free from injury  Outcome: PROGRESSING AS EXPECTED  Goal: Will remain free from falls  Outcome: PROGRESSING AS EXPECTED     Problem: Infection  Goal: Will remain free from infection  Outcome: PROGRESSING AS EXPECTED     Problem: Venous Thromboembolism (VTW)/Deep Vein Thrombosis (DVT) Prevention:  Goal: Patient will participate in Venous Thrombosis (VTE)/Deep Vein Thrombosis (DVT)Prevention Measures  Outcome: PROGRESSING AS EXPECTED

## 2019-10-02 NOTE — PROGRESS NOTES
"Hospital Medicine Daily Progress Note    Date of Service  10/2/2019    Chief Complaint  Bloody Stools    Hospital Course   This is an 88-year-old female with PMH rectal bleeding, HTN, HLD and cognitive impairment who presented 9/19/2019 with bloody stools.  She was hospitalized here in May and had endoscopy and colonoscopy which showed erosive gastroduodenitis, multiple colon polyps, diverticulosis and internal hemorrhoids and was started on PPI therapy.  She had not had any bleeding since then.  Day prior to presentation she took Aleve for headache and noted bloody stools afterwards.  GI was consulted on 920 and recommended nuclear medicine tagged RBC scan which was unremarkable for GI bleeding.  She had ongoing rectal bleeding and GI was reconsulted on 926 and recommended adding hydrocortisone suppository twice daily x10 days and 1 tablespoon of Metamucil with each meal.  Her hemoglobins were trended and remained stable.  She did not require blood transfusion this hospitalization.      Interval Problem Update  10/2 - she reports significant improvement in her left ear pain.  -no acute events overnight. Up to BSC with standby assist.    10/1 - Patient complains of left ear pain.  Daughter reports patient has tendency for earwax buildup. I have ordered twice daily eardrops.    Consultants/Specialty  -Gastroenterology - signed off    Code Status  DNAR/DNI    Disposition  Plan to DC tomorrow with daughter around noon    Review of Systems  Review of Systems   Constitutional: Positive for malaise/fatigue.        Limited due to [place] and dementia.     HENT: Positive for ear pain (\"it's so much better today\").    Cardiovascular: Negative for chest pain.   Gastrointestinal: Negative for nausea.   Neurological: Negative for dizziness and headaches.        Physical Exam  Temp:  [36.4 °C (97.6 °F)-36.9 °C (98.4 °F)] 36.4 °C (97.6 °F)  Pulse:  [] 92  Resp:  [15-19] 19  BP: (106-133)/(51-62) 106/62  SpO2:  [90 %-92 %] 91 " %    Physical Exam   Constitutional: Vital signs are normal. She appears well-developed and well-nourished. She is cooperative.  Non-toxic appearance. No distress.   HENT:   Head: Normocephalic and atraumatic.   Right Ear: External ear normal.   Left Ear: External ear normal.   Eyes: Pupils are equal, round, and reactive to light. Conjunctivae are normal. Right eye exhibits no discharge. Left eye exhibits no discharge.   Neck: Normal range of motion. Neck supple. No JVD present. No tracheal deviation present. No thyromegaly present.   Cardiovascular: Normal rate and normal pulses.   Pulmonary/Chest: Effort normal and breath sounds normal. No stridor. She has no wheezes. She has no rhonchi. She exhibits no tenderness.   Abdominal: Soft. Normal appearance and bowel sounds are normal. She exhibits no distension. There is no tenderness. There is no rebound and no guarding.   Musculoskeletal: Normal range of motion. She exhibits no edema, tenderness or deformity.   Generalized weakness    Up to BSC for voiding   Neurological: She is alert. She is disoriented.   Alert, oriented x1   Skin: Skin is warm and dry. No cyanosis. There is pallor. Nails show no clubbing.   Psychiatric: She has a normal mood and affect. Her speech is normal and behavior is normal. Judgment normal. Cognition and memory are impaired. She exhibits abnormal recent memory and abnormal remote memory.   Nursing note and vitals reviewed.      Fluids    Intake/Output Summary (Last 24 hours) at 10/2/2019 1352  Last data filed at 10/2/2019 1300  Gross per 24 hour   Intake 600 ml   Output --   Net 600 ml       Laboratory  Recent Labs     09/30/19  1134 10/01/19  0357 10/02/19  0227   WBC 5.9 6.2 6.9   RBC 2.79* 2.75* 2.62*   HEMOGLOBIN 7.9* 7.9* 7.4*   HEMATOCRIT 25.5* 25.3* 24.3*   MCV 91.4 92.0 92.7   MCH 28.3 28.7 28.2   MCHC 31.0* 31.2* 30.5*   RDW 58.1* 59.0* 59.9*   PLATELETCT 224 247 235   MPV 9.1 9.2 9.3     Recent Labs     09/30/19  1134   SODIUM 137    POTASSIUM 4.3   CHLORIDE 101   CO2 30   GLUCOSE 108*   BUN 9   CREATININE 0.47*   CALCIUM 9.7                   Imaging  NM-GI BLEEDING SCAN   Final Result      Unremarkable scan for GI bleeding.      CT-ABDOMEN-PELVIS WITH   Final Result      1.  Colonic diverticulosis without evidence for diverticulitis.   2.  No CT evidence for colitis or diverticulitis.      DX-CHEST-PORTABLE (1 VIEW)   Final Result      1.  Mild pulmonary vascular congestion, improved from prior exam.   2.  No lobar pneumonia or overt pulmonary edema.   3.  Double layer calcification of the aortic knob.  Dissection is not excluded.   4.  Prominent central pulmonary vasculature are again seen suggesting pulmonary hypertension.   5.  Stable cardiomegaly.           Assessment/Plan  * GI bleed- (present on admission)  Assessment & Plan  -GI consulted - s/p anoscopy 9/20.  Culprit likely diverticular bleed  -GI re-consult 9/26 - recommended  hydrocortisone 25 mg suppository twice daily x10 days and add 1 tablespoon of Metamucil with each meal.    -trending Hgb - stable. Transfuse for Hgb <7  -normal BM 9/30      Anemia- (present on admission)  Assessment & Plan  -acute on chronic and iron deficiency  -rectal/anal bleeding has stopped  -iron replacement per pharmacy kinetics  -Hgb trended and stable  -no acute s/s bleeding      Hypokalemia- (present on admission)  Assessment & Plan  -resolved    Age-related physical debility- (present on admission)  Assessment & Plan  -Multifactorial due to age, worsening cognitive impairment and GI bleed  -Fall precautions  -PT/OT.  Plan was to send home with home health, however daughter declines stating she has resources to care for patient 24/7 per PT recommendations  -Dietary supplements 3 times daily  -Encourage p.o. Intake  -Frequent reorientation  -Avoid benzos, anticholinergics and narcotics  -Bowel protocol  -voiding schedule        Cognitive impairment- (present on admission)  Assessment & Plan  -Per  "daughter patient is at her baseline mentation  -Please see \"age-related debility\"      Benign essential HTN- (present on admission)  Assessment & Plan  -106/62 this morning  -Continue metoprolol     Left ear pain- (present on admission)  Assessment & Plan  -daughter reports patient has been complaining of ear pain for over a week now. Patient reports much improvement today  -5-10 drops Debrox BID for one more day - do not anticipate DC with RX   -FU OP ENT    Hypothyroid- (present on admission)  Assessment & Plan  -chronic   -Continue Synthroid          VTE prophylaxis: Ambulation      Tiesha Regan A.P.R.N.        "

## 2019-10-02 NOTE — PROGRESS NOTES
Pt on 2 LPM oxygen via nasal cannula.  During assessment, pt unable to think of words during conversation about flushing her IV.  Pt asleep at each rounding check with no signs of distress.

## 2019-10-03 ENCOUNTER — PATIENT OUTREACH (OUTPATIENT)
Dept: HEALTH INFORMATION MANAGEMENT | Facility: OTHER | Age: 84
End: 2019-10-03

## 2019-10-03 VITALS
TEMPERATURE: 97.9 F | DIASTOLIC BLOOD PRESSURE: 64 MMHG | BODY MASS INDEX: 30.23 KG/M2 | WEIGHT: 170.64 LBS | OXYGEN SATURATION: 94 % | HEIGHT: 63 IN | RESPIRATION RATE: 16 BRPM | SYSTOLIC BLOOD PRESSURE: 121 MMHG | HEART RATE: 84 BPM

## 2019-10-03 PROBLEM — E87.6 HYPOKALEMIA: Status: RESOLVED | Noted: 2019-09-22 | Resolved: 2019-10-03

## 2019-10-03 PROBLEM — H92.02 LEFT EAR PAIN: Status: RESOLVED | Noted: 2019-10-01 | Resolved: 2019-10-03

## 2019-10-03 PROBLEM — K62.5 RECTAL BLEED: Status: RESOLVED | Noted: 2019-05-12 | Resolved: 2019-10-03

## 2019-10-03 LAB
BASOPHILS # BLD AUTO: 0.3 % (ref 0–1.8)
BASOPHILS # BLD: 0.02 K/UL (ref 0–0.12)
EOSINOPHIL # BLD AUTO: 0.08 K/UL (ref 0–0.51)
EOSINOPHIL NFR BLD: 1.3 % (ref 0–6.9)
ERYTHROCYTE [DISTWIDTH] IN BLOOD BY AUTOMATED COUNT: 60.5 FL (ref 35.9–50)
HCT VFR BLD AUTO: 25.2 % (ref 37–47)
HGB BLD-MCNC: 7.6 G/DL (ref 12–16)
IMM GRANULOCYTES # BLD AUTO: 0.04 K/UL (ref 0–0.11)
IMM GRANULOCYTES NFR BLD AUTO: 0.6 % (ref 0–0.9)
LYMPHOCYTES # BLD AUTO: 1.73 K/UL (ref 1–4.8)
LYMPHOCYTES NFR BLD: 27.6 % (ref 22–41)
MCH RBC QN AUTO: 28.1 PG (ref 27–33)
MCHC RBC AUTO-ENTMCNC: 30.2 G/DL (ref 33.6–35)
MCV RBC AUTO: 93.3 FL (ref 81.4–97.8)
MONOCYTES # BLD AUTO: 0.87 K/UL (ref 0–0.85)
MONOCYTES NFR BLD AUTO: 13.9 % (ref 0–13.4)
NEUTROPHILS # BLD AUTO: 3.53 K/UL (ref 2–7.15)
NEUTROPHILS NFR BLD: 56.3 % (ref 44–72)
NRBC # BLD AUTO: 0.02 K/UL
NRBC BLD-RTO: 0.3 /100 WBC
PLATELET # BLD AUTO: 253 K/UL (ref 164–446)
PMV BLD AUTO: 9.2 FL (ref 9–12.9)
RBC # BLD AUTO: 2.7 M/UL (ref 4.2–5.4)
WBC # BLD AUTO: 6.3 K/UL (ref 4.8–10.8)

## 2019-10-03 PROCEDURE — 700102 HCHG RX REV CODE 250 W/ 637 OVERRIDE(OP): Performed by: HOSPITALIST

## 2019-10-03 PROCEDURE — 36415 COLL VENOUS BLD VENIPUNCTURE: CPT

## 2019-10-03 PROCEDURE — A9270 NON-COVERED ITEM OR SERVICE: HCPCS | Performed by: HOSPITALIST

## 2019-10-03 PROCEDURE — A9270 NON-COVERED ITEM OR SERVICE: HCPCS | Performed by: FAMILY MEDICINE

## 2019-10-03 PROCEDURE — 85025 COMPLETE CBC W/AUTO DIFF WBC: CPT

## 2019-10-03 PROCEDURE — 700102 HCHG RX REV CODE 250 W/ 637 OVERRIDE(OP): Performed by: FAMILY MEDICINE

## 2019-10-03 PROCEDURE — 99239 HOSP IP/OBS DSCHRG MGMT >30: CPT | Performed by: INTERNAL MEDICINE

## 2019-10-03 RX ORDER — HYDROCORTISONE ACETATE 25 MG/1
25 SUPPOSITORY RECTAL EVERY 12 HOURS
Qty: 6 SUPPOSITORY | Refills: 0 | Status: SHIPPED | OUTPATIENT
Start: 2019-10-03 | End: 2019-10-06

## 2019-10-03 RX ORDER — OMEPRAZOLE 20 MG/1
20 CAPSULE, DELAYED RELEASE ORAL 2 TIMES DAILY
Qty: 60 CAP | Refills: 0 | Status: SHIPPED | OUTPATIENT
Start: 2019-10-03 | End: 2019-11-02

## 2019-10-03 RX ADMIN — OMEPRAZOLE 20 MG: 20 CAPSULE, DELAYED RELEASE ORAL at 06:07

## 2019-10-03 RX ADMIN — HYDROCORTISONE ACETATE 25 MG: 25 SUPPOSITORY RECTAL at 06:07

## 2019-10-03 RX ADMIN — CARBAMIDE PEROXIDE 6.5% 10 DROP: 6.5 LIQUID AURICULAR (OTIC) at 06:15

## 2019-10-03 RX ADMIN — POLYVINYL ALCOHOL 1 DROP: 14 SOLUTION/ DROPS OPHTHALMIC at 06:15

## 2019-10-03 RX ADMIN — LEVOTHYROXINE SODIUM 112 MCG: 112 TABLET ORAL at 06:08

## 2019-10-03 NOTE — DISCHARGE INSTRUCTIONS
Discharge Instructions    Discharged to home by car with relative. Discharged via wheelchair, hospital escort: Yes.  Special equipment needed: Not Applicable    Be sure to schedule a follow-up appointment with your primary care doctor or any specialists as instructed.     Discharge Plan:   Diet Plan: Discussed  Activity Level: Discussed  Confirmed Follow up Appointment: Appointment Scheduled  Confirmed Symptoms Management: Discussed  Medication Reconciliation Updated: Yes  Influenza Vaccine Indication: Indicated: 65 years and older  Influenza Vaccine Given - only chart on this line when given: Influenza Vaccine Given (See MAR)    I understand that a diet low in cholesterol, fat, and sodium is recommended for good health. Unless I have been given specific instructions below for another diet, I accept this instruction as my diet prescription.   Other diet:general    Special Instructions: None    · Is patient discharged on Warfarin / Coumadin?   No     Depression / Suicide Risk      As you are discharged from this [facility] facility, it is important to learn how to keep safe from harming yourself.    Recognize the warning signs:  · Abrupt changes in personality, positive or negative- including increase in energy   · Giving away possessions  · Change in eating patterns- significant weight changes-  positive or negative  · Change in sleeping patterns- unable to sleep or sleeping all the time   · Unwillingness or inability to communicate  · Depression  · Unusual sadness, discouragement and loneliness  · Talk of wanting to die  · Neglect of personal appearance   · Rebelliousness- reckless behavior  · Withdrawal from people/activities they love  · Confusion- inability to concentrate     If you or a loved one observes any of these behaviors or has concerns about self-harm, here's what you can do:  · Talk about it- your feelings and reasons for harming yourself  · Remove any means that you might use to hurt yourself  (examples: pills, rope, extension cords, firearm)  · Get professional help from the community (Mental Health, Substance Abuse, psychological counseling)  · Do not be alone:Call your Safe Contact- someone whom you trust who will be there for you.  · Call your local CRISIS HOTLINE 323-2463 or 445-317-5623  · Call your local Children's Mobile Crisis Response Team [place] (296) 638-0572 or www.smsPREP  · Call the toll free National Suicide Prevention Hotlines   · National Suicide Prevention Lifeline 492-763-RODV (0356)  · National [employer] Line Network 800-SUICIDE (714-4491)      Discharge Instructions per Tiesha Regan A.P.R.N.    -FU with PCP in [place]  -Avoid NSAIDS (Aleve, Ibuprofen) and Aspirin  -Recommend FU with ENT if your left ear pain continues or worsens.  -Take PPI (Omeprazole) twice daily for up to 4 weeks and FU with GI for further recommendations, surveillance, and management.    DIET: As Tolerated    ACTIVITY: As Tolerated    DIAGNOSIS: erosive gastroduodenitis and diverticulosis with internal hemorrhoids    Return to ER if your bleeding resumes, you have chest pain, dizziness, shortness of breath, palpitations, increased weakness.

## 2019-10-03 NOTE — CARE PLAN
Problem: Safety  Goal: Will remain free from falls  Outcome: PROGRESSING AS EXPECTED     Problem: Venous Thromboembolism (VTW)/Deep Vein Thrombosis (DVT) Prevention:  Goal: Patient will participate in Venous Thrombosis (VTE)/Deep Vein Thrombosis (DVT)Prevention Measures  Outcome: PROGRESSING AS EXPECTED  Flowsheets  Taken 10/2/2019 2030  Mechanical Prophylaxis : SCDs, Sequential Compression Device  Taken 10/3/2019 0354  SCDs, Sequential Compression Device: On

## 2019-10-03 NOTE — PROGRESS NOTES
Pt anticipating discharge on 10/3/19 home with daughter.  Pt refused lab draw with lab personnel but agreed after educated on need for results in preparation of discharge.

## 2019-10-03 NOTE — DISCHARGE SUMMARY
Discharge Summary    CHIEF COMPLAINT ON ADMISSION  Chief Complaint   Patient presents with   • Bloody Stools   • Nausea       Reason for Admission  Bloody stool     Admission Date  9/19/2019    Discharge Date  10/03/19    CODE STATUS  Full Code    HPI & HOSPITAL COURSE  This is an 88-year-old female with PMH rectal bleeding, HTN, HLD and cognitive impairment who presented 9/19/2019 with bloody stools.  She was hospitalized here in May and had endoscopy and colonoscopy which showed erosive gastroduodenitis, multiple colon polyps, diverticulosis and internal hemorrhoids and was started on PPI therapy.  She had not had any bleeding since then.  Day prior to presentation she took Aleve for headache and noted bloody stools afterwards.      GI was consulted on 920 and recommended nuclear medicine tagged RBC scan which was unremarkable for GI bleeding.  She had ongoing rectal bleeding and GI was reconsulted on 926 and recommended adding hydrocortisone suppository twice daily x10 days and 1 tablespoon of Metamucil with each meal.  Her hemoglobins were trended and remained stable.  She did not require blood transfusion this hospitalization.     She was seen and examined prior to discharge by myself. Her Hgb's have remained stable and she has had no further evidence of bleeding. She reports feeling at her baseline level of functioning and is eager to return home to [place], [place], with her daughter today.     Therefore, she is discharged in fair and stable condition to home with close outpatient follow-up.    The patient met 2-midnight criteria for an inpatient stay at the time of discharge.    Physical Exam   Constitutional: Vital signs are normal.   Oriented to herself at her baseline.  Pleasant, Cooperative.  Denies Pain.   HENT:   Head: Normocephalic.   Right Ear: Decreased hearing is noted.   Left Ear: Decreased hearing is noted.   Mouth/Throat: Oropharynx is clear and moist and mucous membranes are normal.   Eyes:  Conjunctivae are normal. No scleral icterus.   Neck: Phonation normal. No JVD present.   Cardiovascular: Normal rate.   Murmur heard.  No edema   Pulmonary/Chest: No respiratory distress. She has no wheezes. She has no rhonchi.   Abdominal: Soft. She exhibits no distension. There is no tenderness. There is no rigidity and no guarding.   Genitourinary:   Genitourinary Comments: Voiding clear yellow urine   Musculoskeletal:   Generalized weakness.  GERARD   Neurological:   Oriented x 1 (person)   Skin: Skin is warm and dry. There is pallor.   Psychiatric: Mood, affect and judgment normal. She exhibits abnormal recent memory and abnormal remote memory.   Nursing note and vitals reviewed.    FOLLOW UP ITEMS POST DISCHARGE  Discharge Instructions per DANIEL Oconnell.    -FU with PCP in [place]  -Avoid NSAIDS (Aleve, Ibuprofen) and Aspirin  -Recommend FU with ENT if your left ear pain continues or worsens.  -Take PPI (Omeprazole) twice daily for up to 4 weeks and FU with GI for further recommendations, surveillance, and management.    DIET: As Tolerated    ACTIVITY: As Tolerated    DIAGNOSIS: erosive gastroduodenitis and diverticulosis with internal hemorrhoids    Return to ER if your bleeding resumes, you have chest pain, dizziness, shortness of breath, palpitations, increased weakness.     DISCHARGE DIAGNOSES  Principal Problem (Resolved):    GI bleed POA: Yes  Active Problems:    Benign essential HTN POA: Yes    Cognitive impairment POA: Yes    Age-related physical debility POA: Yes    Anemia POA: Yes    Hypothyroid POA: Yes  Resolved Problems:    Rectal bleed POA: Yes    Hypokalemia POA: Yes    Left ear pain POA: Yes      FOLLOW UP   1. Primary Care Physician.      Why:  Please call to schedule your hospital follow up with your Primary Care Physician when you return home to [place]. Thank you               2. Mitchell Lozoya M.D..      Specialty:  Gastroenterology   Why:  Per office request. Please call to  schedule your hospital follow up. Thank you.    Contact information   [address] [facility]   [place] [address] [phone]        MEDICATIONS ON DISCHARGE     Medication List      START taking these medications      Instructions   hydrocortisone 25 MG Supp  Commonly known as:  ANUSOL-HC   Insert 1 Suppository in rectum every 12 hours for 3 days.  Dose:  25 mg     omeprazole 20 MG delayed-release capsule  Commonly known as:  PRILOSEC   Take 1 Cap by mouth 2 Times a Day for 30 days.  Dose:  20 mg     psyllium 58.6 % Pack  Commonly known as:  METAMUCIL   Take 1 Packet by mouth every day.  Dose:  1 Packet        CONTINUE taking these medications      Instructions   levothyroxine 112 MCG Tabs  Commonly known as:  SYNTHROID   Take 112 mcg by mouth Every morning on an empty stomach.  Dose:  112 mcg     metoprolol 25 MG Tabs  Commonly known as:  LOPRESSOR   Take 25 mg by mouth 2 times a day.  Dose:  25 mg     PRESERVISION AREDS 2+MULTI VIT PO   Take 1 Dose by mouth 2 Times a Day.  Dose:  1 Dose     SUPER B COMPLEX/VITAMIN C Tabs   Take 1 Tab by mouth every morning.  Dose:  1 Tab     SYSTANE OP   Place 1 Drop in both eyes every morning.  Dose:  1 Drop     travoprost 0.004 % Soln  Commonly known as:  TRAVATAN Z   Place 1 Drop in both eyes every evening.  Dose:  1 Drop            Allergies  Allergies   Allergen Reactions   • Codeine      Upset stomach.   • Neosporin [Neomycin-Bacitracin-Polymyxin] Rash       DIET  Orders Placed This Encounter   Procedures   • Diet Order Regular     Standing Status:   Standing     Number of Occurrences:   1     Order Specific Question:   Diet:     Answer:   Regular [1]       ACTIVITY  As tolerated.  Weight bearing as tolerated    CONSULTATIONS  Gastronenterology    PROCEDURES  NONE    LABORATORY  Lab Results   Component Value Date    SODIUM 137 09/30/2019    POTASSIUM 4.3 09/30/2019    CHLORIDE 101 09/30/2019    CO2 30 09/30/2019    GLUCOSE 108 (H) 09/30/2019    BUN 9 09/30/2019    CREATININE  0.47 (L) 09/30/2019        Lab Results   Component Value Date    WBC 6.3 10/03/2019    HEMOGLOBIN 7.6 (L) 10/03/2019    HEMATOCRIT 25.2 (L) 10/03/2019    PLATELETCT 253 10/03/2019        Total time of the discharge process exceeds 38 minutes.    DANIEL Oconnell.

## 2020-08-01 ENCOUNTER — HOSPITAL ENCOUNTER (INPATIENT)
Facility: MEDICAL CENTER | Age: 85
LOS: 8 days | DRG: 327 | End: 2020-08-10
Attending: EMERGENCY MEDICINE | Admitting: HOSPITALIST
Payer: MEDICARE

## 2020-08-01 ENCOUNTER — APPOINTMENT (OUTPATIENT)
Dept: RADIOLOGY | Facility: MEDICAL CENTER | Age: 85
DRG: 327 | End: 2020-08-01
Attending: EMERGENCY MEDICINE
Payer: MEDICARE

## 2020-08-01 DIAGNOSIS — K92.2 LOWER GI BLEED: ICD-10-CM

## 2020-08-01 PROBLEM — F03.90 DEMENTIA (HCC): Status: ACTIVE | Noted: 2020-08-01

## 2020-08-01 PROBLEM — I70.90 ATHEROSCLEROSIS: Status: ACTIVE | Noted: 2020-08-01

## 2020-08-01 PROBLEM — H91.90 HARD OF HEARING: Status: ACTIVE | Noted: 2020-08-01

## 2020-08-01 PROBLEM — Z66 DNR (DO NOT RESUSCITATE): Status: ACTIVE | Noted: 2020-08-01

## 2020-08-01 LAB
ABO GROUP BLD: NORMAL
ALBUMIN SERPL BCP-MCNC: 4 G/DL (ref 3.2–4.9)
ALBUMIN/GLOB SERPL: 1.3 G/DL
ALP SERPL-CCNC: 75 U/L (ref 30–99)
ALT SERPL-CCNC: 6 U/L (ref 2–50)
ANION GAP SERPL CALC-SCNC: 12 MMOL/L (ref 7–16)
APTT PPP: 28.2 SEC (ref 24.7–36)
AST SERPL-CCNC: 16 U/L (ref 12–45)
BARCODED ABORH UBTYP: 1700
BARCODED PRD CODE UBPRD: NORMAL
BARCODED UNIT NUM UBUNT: NORMAL
BASOPHILS # BLD AUTO: 0.3 % (ref 0–1.8)
BASOPHILS # BLD: 0.02 K/UL (ref 0–0.12)
BILIRUB SERPL-MCNC: 0.4 MG/DL (ref 0.1–1.5)
BLD GP AB SCN SERPL QL: NORMAL
BUN SERPL-MCNC: 12 MG/DL (ref 8–22)
CALCIUM SERPL-MCNC: 10.4 MG/DL (ref 8.5–10.5)
CHLORIDE SERPL-SCNC: 96 MMOL/L (ref 96–112)
CO2 SERPL-SCNC: 28 MMOL/L (ref 20–33)
COMPONENT R 8504R: NORMAL
CREAT SERPL-MCNC: 0.47 MG/DL (ref 0.5–1.4)
EKG IMPRESSION: NORMAL
EOSINOPHIL # BLD AUTO: 0.06 K/UL (ref 0–0.51)
EOSINOPHIL NFR BLD: 0.8 % (ref 0–6.9)
ERYTHROCYTE [DISTWIDTH] IN BLOOD BY AUTOMATED COUNT: 51.3 FL (ref 35.9–50)
GLOBULIN SER CALC-MCNC: 3 G/DL (ref 1.9–3.5)
GLUCOSE SERPL-MCNC: 113 MG/DL (ref 65–99)
HCT VFR BLD AUTO: 37 % (ref 37–47)
HGB BLD-MCNC: 11.7 G/DL (ref 12–16)
IMM GRANULOCYTES # BLD AUTO: 0.02 K/UL (ref 0–0.11)
IMM GRANULOCYTES NFR BLD AUTO: 0.3 % (ref 0–0.9)
INR PPP: 0.98 (ref 0.87–1.13)
LACTATE BLD-SCNC: 1.3 MMOL/L (ref 0.5–2)
LYMPHOCYTES # BLD AUTO: 2.43 K/UL (ref 1–4.8)
LYMPHOCYTES NFR BLD: 31 % (ref 22–41)
MCH RBC QN AUTO: 30 PG (ref 27–33)
MCHC RBC AUTO-ENTMCNC: 31.6 G/DL (ref 33.6–35)
MCV RBC AUTO: 94.9 FL (ref 81.4–97.8)
MONOCYTES # BLD AUTO: 0.62 K/UL (ref 0–0.85)
MONOCYTES NFR BLD AUTO: 7.9 % (ref 0–13.4)
NEUTROPHILS # BLD AUTO: 4.68 K/UL (ref 2–7.15)
NEUTROPHILS NFR BLD: 59.7 % (ref 44–72)
NRBC # BLD AUTO: 0 K/UL
NRBC BLD-RTO: 0 /100 WBC
PLATELET # BLD AUTO: 202 K/UL (ref 164–446)
PMV BLD AUTO: 9.4 FL (ref 9–12.9)
POTASSIUM SERPL-SCNC: 3.8 MMOL/L (ref 3.6–5.5)
PRODUCT TYPE UPROD: NORMAL
PROT SERPL-MCNC: 7 G/DL (ref 6–8.2)
PROTHROMBIN TIME: 13.3 SEC (ref 12–14.6)
RBC # BLD AUTO: 3.9 M/UL (ref 4.2–5.4)
RH BLD: NORMAL
SODIUM SERPL-SCNC: 136 MMOL/L (ref 135–145)
UNIT STATUS USTAT: NORMAL
WBC # BLD AUTO: 7.8 K/UL (ref 4.8–10.8)

## 2020-08-01 PROCEDURE — 36415 COLL VENOUS BLD VENIPUNCTURE: CPT

## 2020-08-01 PROCEDURE — 99285 EMERGENCY DEPT VISIT HI MDM: CPT

## 2020-08-01 PROCEDURE — 86901 BLOOD TYPING SEROLOGIC RH(D): CPT

## 2020-08-01 PROCEDURE — 85025 COMPLETE CBC W/AUTO DIFF WBC: CPT

## 2020-08-01 PROCEDURE — 700105 HCHG RX REV CODE 258: Performed by: EMERGENCY MEDICINE

## 2020-08-01 PROCEDURE — C9113 INJ PANTOPRAZOLE SODIUM, VIA: HCPCS | Performed by: EMERGENCY MEDICINE

## 2020-08-01 PROCEDURE — 96374 THER/PROPH/DIAG INJ IV PUSH: CPT

## 2020-08-01 PROCEDURE — 80053 COMPREHEN METABOLIC PANEL: CPT

## 2020-08-01 PROCEDURE — G0378 HOSPITAL OBSERVATION PER HR: HCPCS

## 2020-08-01 PROCEDURE — 93005 ELECTROCARDIOGRAM TRACING: CPT | Performed by: EMERGENCY MEDICINE

## 2020-08-01 PROCEDURE — C9803 HOPD COVID-19 SPEC COLLECT: HCPCS | Performed by: HOSPITALIST

## 2020-08-01 PROCEDURE — 700117 HCHG RX CONTRAST REV CODE 255: Performed by: EMERGENCY MEDICINE

## 2020-08-01 PROCEDURE — 700111 HCHG RX REV CODE 636 W/ 250 OVERRIDE (IP): Performed by: EMERGENCY MEDICINE

## 2020-08-01 PROCEDURE — 700102 HCHG RX REV CODE 250 W/ 637 OVERRIDE(OP): Performed by: HOSPITALIST

## 2020-08-01 PROCEDURE — 96375 TX/PRO/DX INJ NEW DRUG ADDON: CPT

## 2020-08-01 PROCEDURE — 86900 BLOOD TYPING SEROLOGIC ABO: CPT

## 2020-08-01 PROCEDURE — 86850 RBC ANTIBODY SCREEN: CPT

## 2020-08-01 PROCEDURE — 85610 PROTHROMBIN TIME: CPT

## 2020-08-01 PROCEDURE — A9270 NON-COVERED ITEM OR SERVICE: HCPCS | Performed by: HOSPITALIST

## 2020-08-01 PROCEDURE — 74174 CTA ABD&PLVS W/CONTRAST: CPT

## 2020-08-01 PROCEDURE — 85730 THROMBOPLASTIN TIME PARTIAL: CPT

## 2020-08-01 PROCEDURE — 99220 PR INITIAL OBSERVATION CARE,LEVL III: CPT | Performed by: HOSPITALIST

## 2020-08-01 PROCEDURE — 700105 HCHG RX REV CODE 258: Performed by: HOSPITALIST

## 2020-08-01 PROCEDURE — 83605 ASSAY OF LACTIC ACID: CPT

## 2020-08-01 RX ORDER — ONDANSETRON 2 MG/ML
4 INJECTION INTRAMUSCULAR; INTRAVENOUS ONCE
Status: COMPLETED | OUTPATIENT
Start: 2020-08-01 | End: 2020-08-01

## 2020-08-01 RX ORDER — ONDANSETRON 2 MG/ML
4 INJECTION INTRAMUSCULAR; INTRAVENOUS EVERY 4 HOURS PRN
Status: DISCONTINUED | OUTPATIENT
Start: 2020-08-01 | End: 2020-08-10 | Stop reason: HOSPADM

## 2020-08-01 RX ORDER — POLYETHYLENE GLYCOL 3350 17 G/17G
1 POWDER, FOR SOLUTION ORAL
Status: DISCONTINUED | OUTPATIENT
Start: 2020-08-01 | End: 2020-08-10 | Stop reason: HOSPADM

## 2020-08-01 RX ORDER — ENALAPRILAT 1.25 MG/ML
1.25 INJECTION INTRAVENOUS EVERY 6 HOURS PRN
Status: DISCONTINUED | OUTPATIENT
Start: 2020-08-01 | End: 2020-08-10 | Stop reason: HOSPADM

## 2020-08-01 RX ORDER — SODIUM CHLORIDE, SODIUM LACTATE, POTASSIUM CHLORIDE, CALCIUM CHLORIDE 600; 310; 30; 20 MG/100ML; MG/100ML; MG/100ML; MG/100ML
2000 INJECTION, SOLUTION INTRAVENOUS ONCE
Status: COMPLETED | OUTPATIENT
Start: 2020-08-01 | End: 2020-08-02

## 2020-08-01 RX ORDER — LEVOTHYROXINE SODIUM 112 UG/1
112 TABLET ORAL
Status: DISCONTINUED | OUTPATIENT
Start: 2020-08-02 | End: 2020-08-10 | Stop reason: HOSPADM

## 2020-08-01 RX ORDER — AMOXICILLIN 250 MG
2 CAPSULE ORAL 2 TIMES DAILY
Status: DISCONTINUED | OUTPATIENT
Start: 2020-08-01 | End: 2020-08-10 | Stop reason: HOSPADM

## 2020-08-01 RX ORDER — ONDANSETRON 4 MG/1
4 TABLET, ORALLY DISINTEGRATING ORAL EVERY 4 HOURS PRN
Status: DISCONTINUED | OUTPATIENT
Start: 2020-08-01 | End: 2020-08-10 | Stop reason: HOSPADM

## 2020-08-01 RX ORDER — ACETAMINOPHEN 325 MG/1
650 TABLET ORAL EVERY 6 HOURS PRN
Status: DISCONTINUED | OUTPATIENT
Start: 2020-08-01 | End: 2020-08-10 | Stop reason: HOSPADM

## 2020-08-01 RX ORDER — BISACODYL 10 MG
10 SUPPOSITORY, RECTAL RECTAL
Status: DISCONTINUED | OUTPATIENT
Start: 2020-08-01 | End: 2020-08-10 | Stop reason: HOSPADM

## 2020-08-01 RX ORDER — SODIUM CHLORIDE 9 MG/ML
1000 INJECTION, SOLUTION INTRAVENOUS ONCE
Status: COMPLETED | OUTPATIENT
Start: 2020-08-01 | End: 2020-08-01

## 2020-08-01 RX ADMIN — SODIUM CHLORIDE 1000 ML: 9 INJECTION, SOLUTION INTRAVENOUS at 13:25

## 2020-08-01 RX ADMIN — SODIUM CHLORIDE 80 MG: 9 INJECTION, SOLUTION INTRAVENOUS at 16:25

## 2020-08-01 RX ADMIN — ONDANSETRON 4 MG: 2 INJECTION INTRAMUSCULAR; INTRAVENOUS at 13:33

## 2020-08-01 RX ADMIN — SODIUM CHLORIDE, POTASSIUM CHLORIDE, SODIUM LACTATE AND CALCIUM CHLORIDE 2000 ML: 600; 310; 30; 20 INJECTION, SOLUTION INTRAVENOUS at 21:52

## 2020-08-01 RX ADMIN — METOPROLOL TARTRATE 25 MG: 25 TABLET, FILM COATED ORAL at 21:51

## 2020-08-01 RX ADMIN — IOHEXOL 100 ML: 350 INJECTION, SOLUTION INTRAVENOUS at 16:30

## 2020-08-01 SDOH — ECONOMIC STABILITY: FOOD INSECURITY: WITHIN THE PAST 12 MONTHS, THE FOOD YOU BOUGHT JUST DIDN'T LAST AND YOU DIDN'T HAVE MONEY TO GET MORE.: NEVER TRUE

## 2020-08-01 SDOH — ECONOMIC STABILITY: FOOD INSECURITY: WITHIN THE PAST 12 MONTHS, YOU WORRIED THAT YOUR FOOD WOULD RUN OUT BEFORE YOU GOT MONEY TO BUY MORE.: NEVER TRUE

## 2020-08-01 SDOH — ECONOMIC STABILITY: TRANSPORTATION INSECURITY
IN THE PAST 12 MONTHS, HAS LACK OF TRANSPORTATION KEPT YOU FROM MEETINGS, WORK, OR FROM GETTING THINGS NEEDED FOR DAILY LIVING?: NO

## 2020-08-01 SDOH — ECONOMIC STABILITY: TRANSPORTATION INSECURITY
IN THE PAST 12 MONTHS, HAS THE LACK OF TRANSPORTATION KEPT YOU FROM MEDICAL APPOINTMENTS OR FROM GETTING MEDICATIONS?: NO

## 2020-08-01 ASSESSMENT — LIFESTYLE VARIABLES
ON A TYPICAL DAY WHEN YOU DRINK ALCOHOL HOW MANY DRINKS DO YOU HAVE: 0
EVER HAD A DRINK FIRST THING IN THE MORNING TO STEADY YOUR NERVES TO GET RID OF A HANGOVER: NO
EVER_SMOKED: NEVER
EVER FELT BAD OR GUILTY ABOUT YOUR DRINKING: NO
HAVE PEOPLE ANNOYED YOU BY CRITICIZING YOUR DRINKING: NO
TOTAL SCORE: 0
ALCOHOL_USE: NO
HOW MANY TIMES IN THE PAST YEAR HAVE YOU HAD 5 OR MORE DRINKS IN A DAY: 0
CONSUMPTION TOTAL: NEGATIVE
HAVE YOU EVER FELT YOU SHOULD CUT DOWN ON YOUR DRINKING: NO
DOES PATIENT WANT TO STOP DRINKING: NO
TOTAL SCORE: 0
TOTAL SCORE: 0
AVERAGE NUMBER OF DAYS PER WEEK YOU HAVE A DRINK CONTAINING ALCOHOL: 0

## 2020-08-01 ASSESSMENT — FIBROSIS 4 INDEX
FIB4 SCORE: 2.46
FIB4 SCORE: 2.88

## 2020-08-01 ASSESSMENT — COGNITIVE AND FUNCTIONAL STATUS - GENERAL
DRESSING REGULAR LOWER BODY CLOTHING: A LITTLE
MOVING TO AND FROM BED TO CHAIR: A LITTLE
CLIMB 3 TO 5 STEPS WITH RAILING: A LOT
STANDING UP FROM CHAIR USING ARMS: A LITTLE
MOVING FROM LYING ON BACK TO SITTING ON SIDE OF FLAT BED: A LITTLE
SUGGESTED CMS G CODE MODIFIER MOBILITY: CK
WALKING IN HOSPITAL ROOM: A LITTLE
TOILETING: A LITTLE
PERSONAL GROOMING: A LITTLE
MOBILITY SCORE: 18
EATING MEALS: A LITTLE
SUGGESTED CMS G CODE MODIFIER DAILY ACTIVITY: CK
HELP NEEDED FOR BATHING: A LOT
DAILY ACTIVITIY SCORE: 17
DRESSING REGULAR UPPER BODY CLOTHING: A LITTLE

## 2020-08-01 ASSESSMENT — COPD QUESTIONNAIRES
HAVE YOU SMOKED AT LEAST 100 CIGARETTES IN YOUR ENTIRE LIFE: NO/DON'T KNOW
DURING THE PAST 4 WEEKS HOW MUCH DID YOU FEEL SHORT OF BREATH: NONE/LITTLE OF THE TIME
COPD SCREENING SCORE: 2
IN THE PAST 12 MONTHS DO YOU DO LESS THAN YOU USED TO BECAUSE OF YOUR BREATHING PROBLEMS: DISAGREE/UNSURE
DO YOU EVER COUGH UP ANY MUCUS OR PHLEGM?: NO/ONLY WITH OCCASIONAL COLDS OR INFECTIONS

## 2020-08-01 ASSESSMENT — ENCOUNTER SYMPTOMS
BLOOD IN STOOL: 1
HEARTBURN: 0
WEIGHT LOSS: 1
ABDOMINAL PAIN: 1

## 2020-08-01 NOTE — PROGRESS NOTES
CC: bright red blood and clots in stool this am, hx of diveriticulits  89-year-old with no blood thinner history.  Normally sees GI consultants.  Has history of diverticular bleeds in the past.  Has been having bright red blood and clots in stool this morning.    CTA in the ER did not show active GI bleed.  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  ERP already consulted [facility] Dr. Buster Toro will admit this patient

## 2020-08-01 NOTE — ED PROVIDER NOTES
ED Provider Note    Scribed for Pretty Zamora M.D. by Prakash Mckeon (Arcadio). 8/1/2020  4:36 PM    Primary care provider: Pcp Not noted  Means of arrival: Wheel chair  History obtained from: Family member  History limited by: None    CHIEF COMPLAINT  Chief Complaint   Patient presents with   • Bloody Stools     bright red blood and clots in stool this am, hx of diveriticulits       HPI  Марина Giron is a 89 y.o. female with a history of diverticulitis who presents to the ED for evaluation of bloody stools onset this morning. Per daughter who she lives with, the patient's stool was bright red with clots and was foul-smelling. She presented to [facility] ED last year for similar symtpoms and was treated for GI bleed. She does not remember the GI physician who treated her, but her daughter reports she was evaluated with a colonoscopy. She is medicated for hypertension and hypothyroidism, but is not anticoagulated. She reports additional symptoms of vomiting (multiple episodes of emesis today), sinus congestion, and near syncope, but denies any fever, cough, hematemesis, or abdominal pain. In addition to diverticulitis, she has a history of macular degeneration and early signs of dementia, and is hard of hearing.    PPE Note: I personally donned full PPE for all patient encounters during this visit, including wearing an N95 respirator mask, gloves, and goggles.    Scribe remained outside the patient's room and did not have any contact with the patient.    REVIEW OF SYSTEMS  Pertinent positives include bright red, foul-smelling bloody stool with clots, vomiting, sinus congestion, and near syncope. Pertinent negatives include no fever, cough, hematemesis, or abdominal pain. See HPI for further details. All other systems negative.    PAST MEDICAL HISTORY  Past Medical History:   Diagnosis Date   • Chronic back pain    • Diverticulitis    • Hypertension    • Hypothyroid        FAMILY HISTORY  No family history  "on file.    SOCIAL HISTORY  Social History     Tobacco Use   • Smoking status: Never Smoker   • Smokeless tobacco: Never Used   Substance Use Topics   • Alcohol use: No   • Drug use: No        SURGICAL HISTORY  Past Surgical History:   Procedure Laterality Date   • GASTROSCOPY N/A 5/14/2019    Procedure: GASTROSCOPY;  Surgeon: Elvis Cunningham M.D.;  Location: SURGERY [facility];  Service: Gastroenterology   • COLONOSCOPY N/A 5/14/2019    Procedure: COLONOSCOPY;  Surgeon: Elvis Cunningham M.D.;  Location: SURGERY [facility];  Service: Gastroenterology   • COLONOSCOPY WITH POLYP  10/11/2014    Performed by Samuel Mejia M.D. at ENDOSCOPY [facility]   • COLONOSCOPY - ENDO  5/24/2009    Performed by KERRY PADGETT at ENDOSCOPY [facility]       CURRENT MEDICATIONS  Home Medications     Reviewed by Kaushik Porter (Pharmacy Tech) on 08/01/20 at 1512  Med List Status: Complete   Medication Last Dose Status   B Complex-C (SUPER B COMPLEX/VITAMIN C) Tab 8/1/2020 Active   levothyroxine (SYNTHROID) 112 MCG Tab 8/1/2020 Active   metoprolol (LOPRESSOR) 25 MG Tab 8/1/2020 Active   Multiple Vitamins-Minerals (PRESERVISION AREDS 2+MULTI VIT PO) 8/1/2020 Active   Polyethyl Glycol-Propyl Glycol (SYSTANE OP) 8/1/2020 Active   Psyllium (METAMUCIL PO) 8/1/2020 Active                ALLERGIES  Allergies   Allergen Reactions   • Codeine Nausea     Upset stomach.   • Neosporin [Neomycin-Bacitracin-Polymyxin] Rash     Rash         PHYSICAL EXAM  VITAL SIGNS: /60   Pulse 72   Temp 36 °C (96.8 °F) (Temporal)   Resp 17   Ht 1.626 m (5' 4\")   Wt 70.3 kg (155 lb)   SpO2 95%   BMI 26.61 kg/m²      Constitutional:  Well developed, well nourished; No acute distress.  Patient hard of hearing.  HENT: Normocephalic, Atraumatic, Bilateral external ears normal, oral pharyngeal examination deferred due to Covid 19 outbreak.  Eyes: PERRL, EOMI, Conjunctiva normal, No discharge.   Neck: Normal range of motion, supple, " nontender  Lymphatic: No lymphadenopathy noted.   Cardiovascular: Normal heart rate, Normal rhythm, No murmurs, rubs or gallops   Thorax & Lungs: CTA=bilaterally;  No respiratory distress,  No wheezing rales, or rhonchi; No chest tenderness. No crepitus or subQ air  Abdomen: Mild diffuse tenderness to percussion and palpation.  No guarding no rebound, no masses, no pulsatile mass, no tenderness, no distention  Rectal: Large amounts of dark red clotted blood in patient's underpants.  Strong smell of GI bleed in the room.  Skin: Warm, Dry, No erythema, No rash.   Back: No tenderness, No CVA tenderness.   Extremities: 2+ dp and pt pulses bilateral LEs;  Nontender; no pretibial edema  Neurologic: Alert & oriented x 4, clear speech  Psychiatric: appropriate, normal affect    EKG  12 lead EKG interpreted by me as shown below    LABS/RADIOLOGY/PROCEDURES  Results for orders placed or performed during the hospital encounter of 08/01/20   CBC WITH DIFFERENTIAL   Result Value Ref Range    WBC 7.8 4.8 - 10.8 K/uL    RBC 3.90 (L) 4.20 - 5.40 M/uL    Hemoglobin 11.7 (L) 12.0 - 16.0 g/dL    Hematocrit 37.0 37.0 - 47.0 %    MCV 94.9 81.4 - 97.8 fL    MCH 30.0 27.0 - 33.0 pg    MCHC 31.6 (L) 33.6 - 35.0 g/dL    RDW 51.3 (H) 35.9 - 50.0 fL    Platelet Count 202 164 - 446 K/uL    MPV 9.4 9.0 - 12.9 fL    Neutrophils-Polys 59.70 44.00 - 72.00 %    Lymphocytes 31.00 22.00 - 41.00 %    Monocytes 7.90 0.00 - 13.40 %    Eosinophils 0.80 0.00 - 6.90 %    Basophils 0.30 0.00 - 1.80 %    Immature Granulocytes 0.30 0.00 - 0.90 %    Nucleated RBC 0.00 /100 WBC    Neutrophils (Absolute) 4.68 2.00 - 7.15 K/uL    Lymphs (Absolute) 2.43 1.00 - 4.80 K/uL    Monos (Absolute) 0.62 0.00 - 0.85 K/uL    Eos (Absolute) 0.06 0.00 - 0.51 K/uL    Baso (Absolute) 0.02 0.00 - 0.12 K/uL    Immature Granulocytes (abs) 0.02 0.00 - 0.11 K/uL    NRBC (Absolute) 0.00 K/uL   COMP METABOLIC PANEL   Result Value Ref Range    Sodium 136 135 - 145 mmol/L    Potassium  3.8 3.6 - 5.5 mmol/L    Chloride 96 96 - 112 mmol/L    Co2 28 20 - 33 mmol/L    Anion Gap 12.0 7.0 - 16.0    Glucose 113 (H) 65 - 99 mg/dL    Bun 12 8 - 22 mg/dL    Creatinine 0.47 (L) 0.50 - 1.40 mg/dL    Calcium 10.4 8.5 - 10.5 mg/dL    AST(SGOT) 16 12 - 45 U/L    ALT(SGPT) 6 2 - 50 U/L    Alkaline Phosphatase 75 30 - 99 U/L    Total Bilirubin 0.4 0.1 - 1.5 mg/dL    Albumin 4.0 3.2 - 4.9 g/dL    Total Protein 7.0 6.0 - 8.2 g/dL    Globulin 3.0 1.9 - 3.5 g/dL    A-G Ratio 1.3 g/dL   LACTIC ACID   Result Value Ref Range    Lactic Acid 1.3 0.5 - 2.0 mmol/L   APTT   Result Value Ref Range    APTT 28.2 24.7 - 36.0 sec   PROTHROMBIN TIME (INR)   Result Value Ref Range    PT 13.3 12.0 - 14.6 sec    INR 0.98 0.87 - 1.13   COD (ADULT)   Result Value Ref Range    ABO Grouping Only B     Rh Grouping Only POS     Antibody Screen-Cod NEG    ESTIMATED GFR   Result Value Ref Range    GFR If African American >60 >60 mL/min/1.73 m 2    GFR If Non African American >60 >60 mL/min/1.73 m 2   EKG (NOW)   Result Value Ref Range    Report       [facility] Emergency Dept.    Test Date:  2020  Pt Name:    ANAYA FRANKEL            Department: ER  MRN:        [id number]                      Room:       [facility]  Gender:     Female                       Technician: [id number]  :        [id number]                   Requested By:PRETTY ZAMORA  Order #:    [id number]                    Reading MD: Pretty Zamora    Measurements  Intervals                                Axis  Rate:       63                           P:  TX:                                      QRS:        -36  QRSD:       172                          T:          160  QT:         488  QTc:        500    Interpretive Statements  A-FLUTTER W/ PREDOM 4:1 AV BLOCK rate 63  No obvious ST elevation or depression  LEFT BUNDLE BRANCH BLOCK  Compared to ECG 2019 10:42:13  Sinus tachycardia no longer present  Electronically Signed On 2020 16:56:16 PDT by Pretty  Dapra            CTA ABDOMEN PELVIS W & W/O POST PROCESS   Final Result      No active GI bleed is identified at this time.      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      Colonic diverticulosis.      Bilateral renal cysts.      Small hiatal hernia.      Multiple compression fractures in the spine.      Demineralization.                      COURSE & MEDICAL DECISION MAKING  Pertinent Labs & Imaging studies reviewed. (See chart for details)    I reviewed the patient's past medical records which show she presented to [facility] with rectal bleeding in May, 2019 and September, 2019. She was treated for erosive gastroduodenitis, multiple colon polyps, and grade 1 internal hemorrhoids. She has been seen by Dr. Cunningham and Devora (Gastroenterology) for consultation.    12:30 PM - Patient was evaluated at bedside. The patient will be resuscitated with 1L NS infusion. HYDRATION: Based on the patient's presentation of bleeding and hypertension, the patient was given IV fluids. IV Hydration was udsed because oral hydration was not sufficient alone. Upon recheck following hydration, the patient was well-hydrated. Ordered labs and imaging to evaluate the patient's symptoms.    1:32 PM - Ordered Zofran 4 mg injection to alleviate the patient's symptoms.    2:17 PM - Ordered Protonix 80 mg IVPB to alleviate the patient's symptoms.    4:05 PM - I discussed the patient's case with Dr. Goins (GI) who agreed to evaluate the patient at bedside.    4:30 PM - The patient was reevaluated at bedside. Discussed my consult with Dr. Goins (GI) and informed her she will be evaluated for her symptoms. I discussed the plan for admission by Dr. Grady (Hospitalist). The patient understands and agrees to this plan. The patient presumable has had no new bloody bowel movements since my last  "evaluation.    4:49 PM - I discussed the patient's case with Dr. Grady (Hospitalist) who agreed to evaluate the patient for hospitalization.      Patient presents to the ER with dark red blood per rectum which began this morning.  The daughter states that yesterday the patient was fine.  She her mother had a GI bleed when she \"smelled it.\"  The patient has had several lower GI bleeds in the past, likely thought to be diverticular bleeds, and the daughter recognized the smell and immediately brought her to the ER.  Patient has underwear full of dark red blood and clot here in the ER.  There is a strong smell of GI bleed in the room.  Patient complains of abdominal discomfort.  She has a diffusely tender abdomen.  She is otherwise awake and alert and has no other complaints.  No vomiting.  She is not on any blood thinners.  Hemoglobin is stable at slightly over 11.  She had a blood pressure 86 in triage, but is since maintained good blood pressures here in the ER.  She received some IV fluids for her transient hypotension.  CTA of the abdomen pelvis was ordered.  Unfortunately it took a while to get her to CT scanner due to all the traumas we were having.  Since we are waiting so long to get her to CT, I ultimately contacted Dr. Goins, gastroenterologist, who has kindly agreed to come and see the patient.  He agrees with proceeding with CTA.  Patient was given IV Protonix after review of the chart reveals that patient received Protonix with her other lower GI bleed since she does have history of gastroesophagitis as well.  CTA was finally obtained and was read as no acute GI bleed by the radiologist.  At this time patient will need to be hospitalized overnight for further evaluation and monitoring.  Dr. Goins has kindly seen the patient here in the ER in consultation.  I spoke with Dr. Grady, hospitalist on-call, and he will kindly assign 1 of his partners to evaluate the patient for hospitalization.    CRITICAL " CARE  The very real possibilty of a deterioration of this patient's condition required the highest level of my preparedness for sudden, emergent intervention.  I provided critical care services, which included medication orders, frequent reevaluations of the patient's condition and response to treatment, ordering and reviewing test results, and discussing the case with various consultants.  The critical care time associated with the care of the patient was 35 minutes. Review chart for interventions. This time is exclusive of any other billable procedures.        The patient will be admitted by Dr. Grady (Hospitalist) in critical condition.    FINAL IMPRESSION  1. Lower GI bleed Acute   2.      The critical care time associated with the care of the patient was 35 minutes. Review chart for                  interventions. This time is exclusive of any other billable procedures    This dictation has been created using voice recognition software. The accuracy of the dictation is limited by the abilities of the software. I expect there may be some errors of grammar and possibly content. I made every attempt to manually correct the errors within my dictation. However, errors related to voice recognition software may still exist and should be interpreted within the appropriate context.     Prakash GARRIDO (Arcadio), am scribing for, and in the presence of, Pretty Zamora M.D..    Electronically signed by: Prakash Mckeon (Arcadio), 8/1/2020    Pretty GARRIDO M.D. personally performed the services described in this documentation, as scribed by Pretty Zamora M.D. in my presence, and it is both accurate and complete.    The note accurately reflects work and decisions made by me.  Pretty Zamora M.D.  8/1/2020  4:36 PM    C.

## 2020-08-01 NOTE — ED TRIAGE NOTES
Марина Giron  Chief Complaint   Patient presents with   • Bloody Stools     bright red blood and clots in stool this am, hx of diveriticulits     Pt to triage with daughter for above complaint.  VSS, no acute distress.   Hx of same last year.    Pt/staff masked and in appropriate PPE during encounter.   Pt returned to lobby, educated on triage process, and to inform staff of any changes or concerns.

## 2020-08-01 NOTE — ED TRIAGE NOTES
".  Chief Complaint   Patient presents with   • Bloody Stools     bright red blood and clots in stool this am, hx of diveriticulits   Agree with triage assessment.  Pt transported via wheelchair with RN and family escort.  Pt vomits multiple times when reaching Red 7, no blood noted.  Per family Pt \" started feeling nauseated when we pulled into the emergency parking. \"  + minor cough, no difficulty breathing.  No fever/chills.  Abdomen TTP.       "

## 2020-08-01 NOTE — ED NOTES
Pharmacy Medication Reconciliation      Medication reconciliation updated and complete per pt daughter at bedside  Allergies have been verified and updated   No oral ABX within the last 14 days  Pt home pharmacy:[facility]

## 2020-08-02 ENCOUNTER — APPOINTMENT (OUTPATIENT)
Dept: RADIOLOGY | Facility: MEDICAL CENTER | Age: 85
DRG: 327 | End: 2020-08-02
Attending: HOSPITALIST
Payer: MEDICARE

## 2020-08-02 PROBLEM — K57.90 DIVERTICULOSIS: Status: ACTIVE | Noted: 2019-05-12

## 2020-08-02 PROBLEM — D62 ACUTE BLOOD LOSS ANEMIA: Status: ACTIVE | Noted: 2020-08-02

## 2020-08-02 LAB
ANION GAP SERPL CALC-SCNC: 7 MMOL/L (ref 7–16)
BUN SERPL-MCNC: 13 MG/DL (ref 8–22)
CALCIUM SERPL-MCNC: 9.1 MG/DL (ref 8.5–10.5)
CHLORIDE SERPL-SCNC: 103 MMOL/L (ref 96–112)
CO2 SERPL-SCNC: 28 MMOL/L (ref 20–33)
COVID ORDER STATUS COVID19: NORMAL
CREAT SERPL-MCNC: 0.35 MG/DL (ref 0.5–1.4)
ERYTHROCYTE [DISTWIDTH] IN BLOOD BY AUTOMATED COUNT: 51.7 FL (ref 35.9–50)
GLUCOSE SERPL-MCNC: 85 MG/DL (ref 65–99)
HCT VFR BLD AUTO: 26.1 % (ref 37–47)
HGB BLD-MCNC: 7.5 G/DL (ref 12–16)
HGB BLD-MCNC: 8.2 G/DL (ref 12–16)
HGB BLD-MCNC: 8.3 G/DL (ref 12–16)
MCH RBC QN AUTO: 30 PG (ref 27–33)
MCHC RBC AUTO-ENTMCNC: 31.2 G/DL (ref 33.6–35)
MCV RBC AUTO: 96.3 FL (ref 81.4–97.8)
PLATELET # BLD AUTO: 142 K/UL (ref 164–446)
PMV BLD AUTO: 9.7 FL (ref 9–12.9)
POTASSIUM SERPL-SCNC: 4.3 MMOL/L (ref 3.6–5.5)
RBC # BLD AUTO: 2.73 M/UL (ref 4.2–5.4)
SARS-COV-2 RNA RESP QL NAA+PROBE: NOTDETECTED
SODIUM SERPL-SCNC: 138 MMOL/L (ref 135–145)
SPECIMEN SOURCE: NORMAL
WBC # BLD AUTO: 5.4 K/UL (ref 4.8–10.8)

## 2020-08-02 PROCEDURE — 85018 HEMOGLOBIN: CPT | Mod: 91

## 2020-08-02 PROCEDURE — 99233 SBSQ HOSP IP/OBS HIGH 50: CPT | Performed by: HOSPITALIST

## 2020-08-02 PROCEDURE — 36415 COLL VENOUS BLD VENIPUNCTURE: CPT

## 2020-08-02 PROCEDURE — 85027 COMPLETE CBC AUTOMATED: CPT

## 2020-08-02 PROCEDURE — U0003 INFECTIOUS AGENT DETECTION BY NUCLEIC ACID (DNA OR RNA); SEVERE ACUTE RESPIRATORY SYNDROME CORONAVIRUS 2 (SARS-COV-2) (CORONAVIRUS DISEASE [COVID-19]), AMPLIFIED PROBE TECHNIQUE, MAKING USE OF HIGH THROUGHPUT TECHNOLOGIES AS DESCRIBED BY CMS-2020-01-R: HCPCS

## 2020-08-02 PROCEDURE — 770006 HCHG ROOM/CARE - MED/SURG/GYN SEMI*

## 2020-08-02 PROCEDURE — 700102 HCHG RX REV CODE 250 W/ 637 OVERRIDE(OP): Performed by: HOSPITALIST

## 2020-08-02 PROCEDURE — A9270 NON-COVERED ITEM OR SERVICE: HCPCS | Performed by: HOSPITALIST

## 2020-08-02 PROCEDURE — 80048 BASIC METABOLIC PNL TOTAL CA: CPT

## 2020-08-02 RX ADMIN — LEVOTHYROXINE SODIUM 112 MCG: 112 TABLET ORAL at 05:00

## 2020-08-02 RX ADMIN — ACETAMINOPHEN 650 MG: 325 TABLET, FILM COATED ORAL at 21:21

## 2020-08-02 RX ADMIN — METOPROLOL TARTRATE 25 MG: 25 TABLET, FILM COATED ORAL at 05:44

## 2020-08-02 ASSESSMENT — ENCOUNTER SYMPTOMS
WEIGHT LOSS: 1
DEPRESSION: 0
DIARRHEA: 1
BLOOD IN STOOL: 1
HEMOPTYSIS: 0
HEARTBURN: 0
EYE PAIN: 0
VOMITING: 0
ABDOMINAL PAIN: 1
DOUBLE VISION: 0
TINGLING: 0
DIZZINESS: 0
SINUS PAIN: 0
NAUSEA: 0
CLAUDICATION: 0
CHILLS: 0
PALPITATIONS: 0
TREMORS: 0
SHORTNESS OF BREATH: 0
HALLUCINATIONS: 0
NECK PAIN: 0
FEVER: 0

## 2020-08-02 ASSESSMENT — LIFESTYLE VARIABLES: SUBSTANCE_ABUSE: 0

## 2020-08-02 NOTE — ASSESSMENT & PLAN NOTE
ACUTE   2nd to gi bleed  Normocytic  Transfuse if hemoglobin is less then 7  S/p prbc transfusion  Will monitor H/H

## 2020-08-02 NOTE — PROGRESS NOTES
Hospital Medicine Daily Progress Note    Date of Service  8/2/2020    Chief Complaint  89 y.o. female admitted 8/1/2020 with bloody stools    Hospital Course    89 y.o. female with dementia, prior lower GI bleed from diverticuli, dyslipidemia, hypothyroidism who presented 8/1/2020 with bright red blood per rectum this morning.  Her daughter normally helps change her adult briefs for her and at 430 she went to help her and noticed clean briefs other than urine.  Then at 730 she went to change her briefs and the patient had foul-smelling bright red blood of clumps of stool in it.  There is no diarrhea.  The patient denies any heartburn she has not been taking any NSAIDs she had taken an occasional aspirin but not on a routine basis for headache.  She had had prior episodes in the past of diverticular bleed.  Patient was not having nausea or vomiting until she got to the hospital at which time she told her daughter she felt nauseous when she got into the ER she vomited up coffee that she drinks earlier.  The patient did have cream of wheat this morning.     Gastroenterologist Dr. Ryland Goins, and myself did evaluate patient together.  He felt that this is likely diverticular bleed we will monitor her.  He was okay with patient being on clear liquids.  If the patient has any acute bleeding again we will need to have a stat repeat CTA.      Interval Problem Update  [place]; Seen and examined AAO x4, has had a large blood BM this mroning  no pain noted, no n/v  She is passing flatus and BM  No sob  Consultants/Specialty  GI    Code Status  dnr/dni    Disposition  tbd    Review of Systems  Review of Systems   Unable to perform ROS: Dementia   Constitutional: Positive for weight loss.   HENT: Negative for ear discharge, sinus pain and tinnitus.    Eyes: Negative for double vision and pain.   Respiratory: Negative for hemoptysis and shortness of breath.    Cardiovascular: Negative for palpitations and claudication.    Gastrointestinal: Positive for abdominal pain and blood in stool. Negative for heartburn, nausea and vomiting.   Genitourinary: Negative for dysuria and hematuria.   Musculoskeletal: Negative for joint pain and neck pain.   Neurological: Negative for dizziness, tingling and tremors.   Psychiatric/Behavioral: Negative for depression, hallucinations and substance abuse.        Physical Exam  Temp:  [35.8 °C (96.4 °F)-36.3 °C (97.4 °F)] 36.3 °C (97.4 °F)  Pulse:  [62-82] 65  Resp:  [16-21] 16  BP: ()/(49-79) 118/63  SpO2:  [84 %-100 %] 98 %    Physical Exam  Vitals signs reviewed.   Constitutional:       Appearance: Normal appearance. She is not diaphoretic.   HENT:      Head: Normocephalic and atraumatic.      Nose: Nose normal.      Mouth/Throat:      Mouth: Mucous membranes are moist.      Pharynx: No oropharyngeal exudate.   Eyes:      General: No scleral icterus.        Right eye: No discharge.         Left eye: No discharge.      Extraocular Movements: Extraocular movements intact.      Conjunctiva/sclera: Conjunctivae normal.   Neck:      Musculoskeletal: Neck supple. No muscular tenderness.   Cardiovascular:      Rate and Rhythm: Normal rate and regular rhythm.      Pulses:           Radial pulses are 2+ on the right side and 2+ on the left side.        Dorsalis pedis pulses are 2+ on the right side and 2+ on the left side.      Heart sounds: No murmur.   Pulmonary:      Effort: Pulmonary effort is normal. No respiratory distress.      Breath sounds: Normal breath sounds. No wheezing or rales.   Abdominal:      General: Bowel sounds are normal. There is no distension.      Palpations: Abdomen is soft.      Tenderness: There is abdominal tenderness (LLQ).   Musculoskeletal:         General: No swelling or tenderness.      Right lower leg: Edema (trace bilateral lower leg edema) present.      Left lower leg: Edema present.   Lymphadenopathy:      Cervical: No cervical adenopathy.   Skin:     Coloration:  Skin is pale. Skin is not jaundiced.   Neurological:      General: No focal deficit present.      Mental Status: She is oriented to person, place, and time. Mental status is at baseline.      Cranial Nerves: No cranial nerve deficit.   Psychiatric:         Mood and Affect: Mood normal.         Behavior: Behavior normal.         Fluids    Intake/Output Summary (Last 24 hours) at 8/2/2020 1032  Last data filed at 8/2/2020 0315  Gross per 24 hour   Intake 1000 ml   Output 1 ml   Net 999 ml       Laboratory  Recent Labs     08/01/20  1220 08/02/20  0610   WBC 7.8 5.4   RBC 3.90* 2.73*   HEMOGLOBIN 11.7* 8.3*   HEMATOCRIT 37.0 26.1*   MCV 94.9 96.3   MCH 30.0 30.0   MCHC 31.6* 31.2*   RDW 51.3* 51.7*   PLATELETCT 202 142*   MPV 9.4 9.7     Recent Labs     08/01/20  1220 08/02/20  0610   SODIUM 136 138   POTASSIUM 3.8 4.3   CHLORIDE 96 103   CO2 28 28   GLUCOSE 113* 85   BUN 12 13   CREATININE 0.47* 0.35*   CALCIUM 10.4 9.1     Recent Labs     08/01/20  1220   APTT 28.2   INR 0.98               Imaging  CTA ABDOMEN PELVIS W & W/O POST PROCESS   Final Result      No active GI bleed is identified at this time.      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      Colonic diverticulosis.      Bilateral renal cysts.      Small hiatal hernia.      Multiple compression fractures in the spine.      Demineralization.                       Assessment/Plan  * Diverticulosis- (present on admission)  Assessment & Plan  Monitor Hgb  Hx of diverticular bleed ~ 1 yr ago; had a c-scope done 1 yr ago as well  Likely recurrent diverticular bleed  Rocephin for any possible divertculitis given her abdominal tenderness.  Check lactic acid given atheroslcerosis finding on CTA.  GI consulted by ED  Not on blood thinners  IV fluids.  Fluctuating BP in ED  INR 0.98    Observe bleeding for  now as the CTA angiogram did now show a source of bleeding and GI does not plan for another c-scope since the one last year was noted.  hgb stable 8.3    Benign essential HTN- (present on admission)  Assessment & Plan  Metoprolol 25mg BID w/ parameters  Monitor vitals.    Acute blood loss anemia  Assessment & Plan  Diverticular bleed  Hx of this as well about 1 year ago  hgb at 8.3 today  Still having bloody BM  Gi on board  CTA angio neg  Monitor for now  Transfuse is symptomatic or if hgb drops <7    Atherosclerosis  Assessment & Plan  CT showing atherosclerosis with narrowing of the SMA artery as well as renal arteries.  No current renal failure.  Continue medical management with blood pressure control    DNR (do not resuscitate)  Assessment & Plan  Daughter, Meme, states she her mother wants DNR/DNI    Hard of hearing  Assessment & Plan  Has hearing aids, hears better in left ear.  We will have to talk loudly and slowly on her left side.    Dementia (HCC)  Assessment & Plan  Daughter cares for her mother and states she is POA.    HLD (hyperlipidemia)- (present on admission)  Assessment & Plan  Healthy balanced diet.    Hypothyroid- (present on admission)  Assessment & Plan  Levothyroxine 112 mcg daily  Last TSH:1.61 in past year       VTE prophylaxis: scds    Discussed plan of care with patient, nursing and GI  Monitor for now

## 2020-08-02 NOTE — PROGRESS NOTES
Gastroenterology Progress Note     Author: Ryland Goins M.D.   Date & Time Created: 2020 11:35 AM    Chief Complaint:  GI bleeding    Interval History:  Passed a large bloody BM this with clots according to nursing. Patient denies abd pain    Review of Systems:  Review of Systems   Constitutional: Positive for malaise/fatigue. Negative for chills and fever.   Gastrointestinal: Positive for blood in stool and diarrhea.       Physical Exam:  Physical Exam  Constitutional:       Appearance: Normal appearance.   HENT:      Head: Normocephalic and atraumatic.   Eyes:      General: No scleral icterus.     Extraocular Movements: Extraocular movements intact.   Cardiovascular:      Rate and Rhythm: Normal rate and regular rhythm.      Heart sounds: No murmur.   Pulmonary:      Effort: Pulmonary effort is normal.      Breath sounds: Normal breath sounds.   Abdominal:      General: Abdomen is flat. There is no distension.      Palpations: Abdomen is soft.      Tenderness: There is abdominal tenderness. There is no guarding.   Neurological:      Mental Status: She is alert.         Labs:          Recent Labs     20  1220 20  0610   SODIUM 136 138   POTASSIUM 3.8 4.3   CHLORIDE 96 103   CO2 28 28   BUN 12 13   CREATININE 0.47* 0.35*   CALCIUM 10.4 9.1     Recent Labs     20  1220 20  0610   ALTSGPT 6  --    ASTSGOT 16  --    ALKPHOSPHAT 75  --    TBILIRUBIN 0.4  --    GLUCOSE 113* 85     Recent Labs     20  1220 20  0610   RBC 3.90* 2.73*   HEMOGLOBIN 11.7* 8.3*   HEMATOCRIT 37.0 26.1*   PLATELETCT 202 142*   PROTHROMBTM 13.3  --    APTT 28.2  --    INR 0.98  --      Recent Labs     20  1220 20  0610   WBC 7.8 5.4   NEUTSPOLYS 59.70  --    LYMPHOCYTES 31.00  --    MONOCYTES 7.90  --    EOSINOPHILS 0.80  --    BASOPHILS 0.30  --    ASTSGOT 16  --    ALTSGPT 6  --    ALKPHOSPHAT 75  --    TBILIRUBIN 0.4  --      Hemodynamics:  Temp (24hrs), Av.1 °C (97 °F), Min:35.8 °C  "(96.4 °F), Max:36.3 °C (97.4 °F)  Temperature: 36.3 °C (97.4 °F)  Pulse  Av.4  Min: 62  Max: 82   Blood Pressure : 118/63     Respiratory:    Respiration: 16, Pulse Oximetry: 98 %        RUL Breath Sounds: Clear, RML Breath Sounds: Clear, RLL Breath Sounds: Clear;Diminished, ZAIN Breath Sounds: Clear, LLL Breath Sounds: Clear;Diminished  Fluids:    Intake/Output Summary (Last 24 hours) at 2020 1135  Last data filed at 2020 0315  Gross per 24 hour   Intake 1000 ml   Output 1 ml   Net 999 ml     Weight: 73.5 kg (162 lb 0.6 oz)  GI/Nutrition:  Orders Placed This Encounter   Procedures   • Diet Order Clear Liquid     Standing Status:   Standing     Number of Occurrences:   1     Order Specific Question:   Diet:     Answer:   Clear Liquid [10]     Medical Decision Making, by Problem:  Active Hospital Problems    Diagnosis   • *Diverticulosis [K57.90]   • Benign essential HTN [I10]   • Dementia (HCC) [F03.90]   • Hard of hearing [H91.90]   • DNR (do not resuscitate) [Z66]   • Atherosclerosis [I70.90]   • HLD (hyperlipidemia) [E78.5]   • Hypothyroid [E03.9]   • Acute blood loss anemia [D62]     Impression and Plan    1) Lower GI bleeding - Unclear if the large bloody BM she had this AM is from ongoing bleeding or older blood but with some \"red\" components to it and the drop in HgB I am going to order a Bleeding scan at this time. If positive will need CTA and IR embolization?  2) Anemia, acute blood loss  3) Diverticulosis  4) Dementia  5) Nausea and vomiting    Dr Chatman to take over me tomorrow    EMO        Quality-Core Measures  "

## 2020-08-02 NOTE — CARE PLAN
Problem: Communication  Goal: The ability to communicate needs accurately and effectively will improve  Outcome: PROGRESSING AS EXPECTED  Pt encouraged to call with questions or concerns     Problem: Safety  Goal: Will remain free from injury  Outcome: PROGRESSING AS EXPECTED  Personal belongings and call light within reach

## 2020-08-02 NOTE — H&P
"Hospital Medicine History & Physical Note    Date of Service  8/1/2020    Primary Care Physician  Pcp Not In Computer    Code Status  DNAR/DNI    Chief Complaint  Chief Complaint   Patient presents with   • Bloody Stools     bright red blood and clots in stool this am, hx of diveriticulits       History of Presenting Illness  89 y.o. female with dementia, prior lower GI bleed from diverticuli, dyslipidemia, hypothyroidism who presented 8/1/2020 with bright red blood per rectum this morning.  Her daughter normally helps change her adult briefs for her and at 430 she went to help her and noticed clean briefs other than urine.  Then at 730 she went to change her briefs and the patient had foul-smelling bright red blood of clumps of stool in it.  There is no diarrhea.  The patient denies any heartburn she has not been taking any NSAIDs she had taken an occasional aspirin but not on a routine basis for headache.  She had had prior episodes in the past of diverticular bleed.  Patient was not having nausea or vomiting until she got to the hospital at which time she told her daughter she felt nauseous when she got into the ER she vomited up coffee that she drinks earlier.  The patient did have cream of wheat this morning.    Gastroenterologist Dr. Ryland Goins, and myself did evaluate patient together.  He felt that this is likely diverticular bleed we will monitor her.  He was okay with patient being on clear liquids.  If the patient has any acute bleeding again we will need to have a stat repeat CTA.    Review of Systems  Review of Systems   Unable to perform ROS: Dementia   Constitutional: Positive for weight loss (per daughter \"her clothes fit her looser now\").   Gastrointestinal: Positive for abdominal pain and blood in stool. Negative for heartburn.       Past Medical History   has a past medical history of Chronic back pain, Diverticulitis, Hypertension, and Hypothyroid.    Surgical History   has a past surgical " history that includes colonoscopy - endo (2009); colonoscopy with polyp (10/11/2014); gastroscopy (N/A, 2019); and colonoscopy (N/A, 2019).     Family History  Parents      Social History   reports that she has never smoked. She has never used smokeless tobacco. She reports that she does not drink alcohol or use drugs. , has had 6 kids, 4 surviving.  Patient lives with her daughter and grandson in [facility] [place] of [place].    Allergies  Allergies   Allergen Reactions   • Codeine Nausea     Upset stomach.   • Neosporin [Neomycin-Bacitracin-Polymyxin] Rash     Rash         Medications  Prior to Admission Medications   Prescriptions Last Dose Informant Patient Reported? Taking?   B Complex-C (SUPER B COMPLEX/VITAMIN C) Tab 2020 at 0830 Family Member Yes No   Sig: Take 1 Tab by mouth every morning.   Multiple Vitamins-Minerals (PRESERVISION AREDS 2+MULTI VIT PO) 2020 at 0830 Family Member Yes No   Sig: Take 1 Dose by mouth 2 Times a Day.   Polyethyl Glycol-Propyl Glycol (SYSTANE OP) 2020 at 0830 Family Member Yes No   Sig: Place 1 Drop in both eyes every morning.   Psyllium (METAMUCIL PO) 2020 at 0830 Family Member Yes Yes   Sig: Take 1 Cap by mouth 2 Times a Day.   levothyroxine (SYNTHROID) 112 MCG Tab 2020 at 0800 Family Member Yes No   Sig: Take 112 mcg by mouth Every morning on an empty stomach.   metoprolol (LOPRESSOR) 25 MG Tab 2020 at 0830 Family Member Yes No   Sig: Take 25 mg by mouth 2 times a day.      Facility-Administered Medications: None       Physical Exam  Temp:  [36 °C (96.8 °F)] 36 °C (96.8 °F)  Pulse:  [62-82] 62  Resp:  [17-21] 20  BP: ()/(49-75) 169/75  SpO2:  [84 %-100 %] 100 %    Physical Exam  Vitals signs reviewed.   Constitutional:       Appearance: Normal appearance. She is not diaphoretic.   HENT:      Head: Normocephalic and atraumatic.      Nose: Nose normal.      Mouth/Throat:      Mouth: Mucous membranes are  moist.      Pharynx: No oropharyngeal exudate.   Eyes:      General: No scleral icterus.        Right eye: No discharge.         Left eye: No discharge.      Extraocular Movements: Extraocular movements intact.      Conjunctiva/sclera: Conjunctivae normal.   Neck:      Musculoskeletal: Neck supple. No muscular tenderness.   Cardiovascular:      Rate and Rhythm: Normal rate and regular rhythm.      Pulses:           Radial pulses are 2+ on the right side and 2+ on the left side.        Dorsalis pedis pulses are 2+ on the right side and 2+ on the left side.      Heart sounds: No murmur.   Pulmonary:      Effort: Pulmonary effort is normal. No respiratory distress.      Breath sounds: Normal breath sounds. No wheezing or rales.   Abdominal:      General: Bowel sounds are normal. There is no distension.      Palpations: Abdomen is soft.      Tenderness: There is abdominal tenderness (LLQ).   Musculoskeletal:         General: No swelling or tenderness.      Right lower leg: Edema (trace bilateral lower leg edema) present.      Left lower leg: Edema present.   Lymphadenopathy:      Cervical: No cervical adenopathy.   Skin:     Coloration: Skin is pale. Skin is not jaundiced.   Neurological:      General: No focal deficit present.      Mental Status: She is oriented to person, place, and time. Mental status is at baseline.      Cranial Nerves: No cranial nerve deficit.   Psychiatric:         Mood and Affect: Mood normal.         Behavior: Behavior normal.         Laboratory:  Recent Labs     08/01/20  1220   WBC 7.8   RBC 3.90*   HEMOGLOBIN 11.7*   HEMATOCRIT 37.0   MCV 94.9   MCH 30.0   MCHC 31.6*   RDW 51.3*   PLATELETCT 202   MPV 9.4     Recent Labs     08/01/20  1220   SODIUM 136   POTASSIUM 3.8   CHLORIDE 96   CO2 28   GLUCOSE 113*   BUN 12   CREATININE 0.47*   CALCIUM 10.4     Recent Labs     08/01/20  1220   ALTSGPT 6   ASTSGOT 16   ALKPHOSPHAT 75   TBILIRUBIN 0.4   GLUCOSE 113*     Recent Labs     08/01/20  1220    APTT 28.2   INR 0.98     No results for input(s): NTPROBNP in the last 72 hours.      No results for input(s): TROPONINT in the last 72 hours.    Imaging:  CTA ABDOMEN PELVIS W & W/O POST PROCESS   Final Result      No active GI bleed is identified at this time.      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      Colonic diverticulosis.      Bilateral renal cysts.      Small hiatal hernia.      Multiple compression fractures in the spine.      Demineralization.                        Assessment/Plan:  I anticipate this patient is appropriate for observation status at this time.    * Rectal bleed- (present on admission)  Assessment & Plan  Monitor Hgb  Hx of diverticular bleed ~ 1 yr ago  Likely recurrent diverticular bleed  Rocephin for any possible divertculitis given her abdominal tenderness.  Check lactic acid given atheroslcerosis finding on CTA.  GI consulted by ED  Not on blood thinners  IV fluids.  Fluctuating BP in ED  INR 0.98    Benign essential HTN- (present on admission)  Assessment & Plan  Metoprolol 25mg BID w/ parameters  Monitor vitals.    Atherosclerosis  Assessment & Plan  CT showing atherosclerosis with narrowing of the SMA artery as well as renal arteries.  No current renal failure.  Continue medical management with blood pressure control    DNR (do not resuscitate)  Assessment & Plan  DaughterMeme, states she her mother wants DNR/DNI    Hard of hearing  Assessment & Plan  Has hearing aids, hears better in left ear.  We will have to talk loudly and slowly on her left side.    Dementia (HCC)  Assessment & Plan  Daughter cares for her mother and states she is POA.    HLD (hyperlipidemia)- (present on admission)  Assessment & Plan  Healthy balanced diet.    Hypothyroid- (present on admission)  Assessment & Plan  Levothyroxine 112 mcg  daily  Last TSH:1.61 in past year

## 2020-08-02 NOTE — ASSESSMENT & PLAN NOTE
CT showing atherosclerosis with narrowing of the SMA artery as well as renal arteries.  No current renal failure.  Continue medical management with blood pressure control

## 2020-08-02 NOTE — PROGRESS NOTES
Bedside report received.  Assessment complete.  A&O x 4. Patient calls appropriately.  Patient up with 1x assist. Bed alarm NA.   Patient has 0/10 pain. Medication per MAR  Denies N&V. Tolerating clear liquid diet.  + void, + flatus, +, PTA BM.   - Incontinent of urine and bowel  Patient denies SOB.  SCD's yes, on.    Review plan with of care with patient. Call light and personal belongings with in reach. Hourly rounding in place. All needs met at this time.

## 2020-08-02 NOTE — ASSESSMENT & PLAN NOTE
Has hearing aids, hears better in left ear.  We will have to talk loudly and slowly on her left side.

## 2020-08-02 NOTE — CARE PLAN
Problem: Communication  Goal: The ability to communicate needs accurately and effectively will improve  Outcome: PROGRESSING AS EXPECTED   Participation in POC  Problem: Safety  Goal: Will remain free from injury  Outcome: PROGRESSING AS EXPECTED   Fall precautions in place  Problem: Skin Integrity  Goal: Risk for impaired skin integrity will decrease  Outcome: PROGRESSING AS EXPECTED   Barrier cream, and frequent assessments to see if patient is wet.

## 2020-08-02 NOTE — CONSULTS
DATE OF SERVICE:  08/01/2020    GASTROENTEROLOGY CONSULTATION    REQUESTING PHYSICIAN:  Pretty Zamora MD    REASON FOR REQUEST:  Lower GI bleeding.    HISTORY OF PRESENT ILLNESS:  Patient is an 89-year-old female with a history   of recurrent GI bleeding twice within the last year.  She presents now,   describing with her daughter's help, bright red blood mixed with small amounts   of stool in her diaper twice today.  It has been going on for only 1 day.    She denies any abdominal pain.  No history of diarrhea.  They both deny any   constipation.  She does admit to some nausea and vomiting that really did not   start until arrival at the emergency room here today.  No hematemesis, no   melena.  She does admit to lightheadedness but denies any shortness of breath   or chest pain.  No urinary complaints.  Of note, in the past in 05/2019 when   she presented with GI bleeding, she had an EGD that showed moderate gastritis   only and a colonoscopy reviewed diffuse severe pandiverticulosis and 4 small   polyps that were removed.  She represented in 10/2019 with another episode of   GI bleeding.  At this time, bleeding scan was negative, but no additional   workup was done when there was no further bleeding on observation.  It was   attributed to diverticulosis at that time that had resolved.    REVIEW OF SYSTEMS:  Positive as noted above, also positive for difficulty   hearing.  Otherwise, complete review of systems is negative other than that   noted in the HPI above.    PAST MEDICAL HISTORY:  Mild dementia, severe hearing loss, hypertension,   recurrent GI bleeding, diverticulosis, chronic lower back pain,   hypothyroidism, colon polyps, and macular degeneration.    ALLERGIES:  SHE HAS ALLERGIES TO CODEINE AND NEOSPORIN.    PAST SURGICAL HISTORY:  No past surgical history.    MEDICATIONS:  Include MiraLax, Metamucil, Synthroid, metoprolol.    SOCIAL HISTORY:  She does not use any drugs.  She does not smoke and does  not   use alcohol.    FAMILY MEDICAL HISTORY:  Noncontributory.    PHYSICAL EXAMINATION:  VITAL SIGNS:  Blood pressure 169/75 with a heart rate of 62, respiratory rate   of 20, satting 100% on 5 liters nasal cannula.  GENERAL:  She is a pleasant, although very hard of hearing, elderly white   female in no acute distress.  HEENT:  Reveals that her extraocular movements are intact bilaterally.  Her   sclerae are anicteric bilaterally.  Oral exam reveals a Mallampati 3 score   without oral lesions.  CARDIOVASCULAR:  Regular rate and rhythm without murmurs.  LUNGS:  Clear to auscultation bilaterally in the anterior lung fields.  ABDOMEN:  Soft, completely nontender, no distention.  No guarding.  No   organomegaly or masses appreciated.  SKIN:  Negative for rashes or pallor.  EXTREMITIES:  Evaluation reveals bilateral trace pitting edema.    LABORATORY DATA:  CBC reveals a white blood cell count of 7.8, hemoglobin   11.7, hematocrit 37, platelet count 202.  BMP reveals sodium 136, potassium   3.8, chloride 96, bicarbonate 28, BUN 12, creatinine 0.47, glucose of 113.    Liver enzymes are normal.  INR is normal.     IMAGING:  CT angiogram was negative for any active GI bleeding.  There is   evidence of diverticulosis and renal cysts and peripheral vascular disease,   multiple compression fractures and a small hiatal hernia.    IMPRESSION/PLAN/MEDICAL DECISION MAKIN.  Lower gastrointestinal bleeding.  Once again, she has had another episode,   now her third, of gastrointestinal bleeding and once again this one is most   likely secondary to her diverticulosis.  Unfortunately, the CT angiogram was   negative for the possibility of isolating the exact site and potentially   treating with interventional radiology coil embolization.  At this point, we   have discussed myself, patient's daughter and Dr. Toro that we would observe   for any sign of rebleeding.  Of course, if this were to occur, then a repeat   CT angiogram  would be needed.  I do not think there is any need for repeat   colonoscopy as her last one was a little over a year ago and unlikely for   anything to develop in this description of her bleeding that would account for   it other than the diverticulosis.  I do not think this is an upper   gastrointestinal bleed given the normal BUN, creatinine ratio and her   description of bright red blood.  We will follow labs and follow her   clinically.  I think she can have a clear liquid diet and I will follow along.  2.  Anemia, acute gastrointestinal blood loss, see discussion above.  3.  Diverticulosis.  4.  Dementia.  5.  Nausea and vomiting.       ____________________________________     MD RAJEEV GILL / JENNIFER    DD:  08/01/2020 18:27:38  DT:  08/01/2020 20:33:03    D#:  [id number]  Job#:  [id number]    cc: TIBURCIO VALDIVIA DO

## 2020-08-02 NOTE — PROGRESS NOTES
2 RN Skin Assessment    CDI, no abrasions  PIV R-AC  All other skin intact, pt. turns self from side to side, pillows in place for support

## 2020-08-02 NOTE — PROGRESS NOTES
[facility] ambulated to bed with FWW

## 2020-08-03 ENCOUNTER — APPOINTMENT (OUTPATIENT)
Dept: RADIOLOGY | Facility: MEDICAL CENTER | Age: 85
DRG: 327 | End: 2020-08-03
Attending: INTERNAL MEDICINE
Payer: MEDICARE

## 2020-08-03 LAB
HGB BLD-MCNC: 7.3 G/DL (ref 12–16)
HGB BLD-MCNC: 7.6 G/DL (ref 12–16)
HGB BLD-MCNC: 7.6 G/DL (ref 12–16)

## 2020-08-03 PROCEDURE — A9560 TC99M LABELED RBC: HCPCS

## 2020-08-03 PROCEDURE — 770006 HCHG ROOM/CARE - MED/SURG/GYN SEMI*

## 2020-08-03 PROCEDURE — 36415 COLL VENOUS BLD VENIPUNCTURE: CPT

## 2020-08-03 PROCEDURE — 302146: Performed by: HOSPITALIST

## 2020-08-03 PROCEDURE — 85018 HEMOGLOBIN: CPT

## 2020-08-03 PROCEDURE — A9270 NON-COVERED ITEM OR SERVICE: HCPCS | Performed by: HOSPITALIST

## 2020-08-03 PROCEDURE — 700102 HCHG RX REV CODE 250 W/ 637 OVERRIDE(OP): Performed by: HOSPITALIST

## 2020-08-03 PROCEDURE — 99233 SBSQ HOSP IP/OBS HIGH 50: CPT | Performed by: HOSPITALIST

## 2020-08-03 RX ADMIN — METOPROLOL TARTRATE 25 MG: 25 TABLET, FILM COATED ORAL at 05:28

## 2020-08-03 RX ADMIN — LEVOTHYROXINE SODIUM 112 MCG: 112 TABLET ORAL at 05:28

## 2020-08-03 ASSESSMENT — ENCOUNTER SYMPTOMS
COUGH: 0
HALLUCINATIONS: 0
NAUSEA: 0
DOUBLE VISION: 0
VOMITING: 0
WEIGHT LOSS: 1
TREMORS: 0
SHORTNESS OF BREATH: 0
DIZZINESS: 0
PALPITATIONS: 0
SINUS PAIN: 0
TINGLING: 0
ABDOMINAL PAIN: 0
DEPRESSION: 0
CLAUDICATION: 0
EYE PAIN: 0
NECK PAIN: 0
HEARTBURN: 0
BLOOD IN STOOL: 1
HEMOPTYSIS: 0
ABDOMINAL PAIN: 1

## 2020-08-03 ASSESSMENT — LIFESTYLE VARIABLES: SUBSTANCE_ABUSE: 0

## 2020-08-03 NOTE — PROGRESS NOTES
Assumed care at 0700    Received report from NOC shift RN.    Reviewed recent lab results, notes, orders, and MAR  POC discussed and updated on care board  Bed is in the lowest and locked position, call light within reach      A&Ox4  Hard of hearing  1L Nasal canula   Saturating at 100%  Denies SOB  Denies pain intervention  Tolerating clear liquid diet  Incontinence     +voiding   +bm    Medium with bright blood and dark blood, tarry.    Sacrum is becoming red.   Waffle mattress, Q2 turns. Barrier cream  2x assist with FWW

## 2020-08-03 NOTE — PROGRESS NOTES
Hospital Medicine Daily Progress Note    Date of Service  8/3/2020    Chief Complaint  89 y.o. female admitted 8/1/2020 with bloody stools    Hospital Course    89 y.o. female with dementia, prior lower GI bleed from diverticuli, dyslipidemia, hypothyroidism who presented 8/1/2020 with bright red blood per rectum this morning.  Her daughter normally helps change her adult briefs for her and at 430 she went to help her and noticed clean briefs other than urine.  Then at 730 she went to change her briefs and the patient had foul-smelling bright red blood of clumps of stool in it.  There is no diarrhea.  The patient denies any heartburn she has not been taking any NSAIDs she had taken an occasional aspirin but not on a routine basis for headache.  She had had prior episodes in the past of diverticular bleed.  Patient was not having nausea or vomiting until she got to the hospital at which time she told her daughter she felt nauseous when she got into the ER she vomited up coffee that she drinks earlier.  The patient did have cream of wheat this morning.     Gastroenterologist Dr. Ryland Goins, and myself did evaluate patient together.  He felt that this is likely diverticular bleed we will monitor her.  He was okay with patient being on clear liquids.  If the patient has any acute bleeding again we will need to have a stat repeat CTA.      Interval Problem Update  [place]; Seen and examined AAO x4, has had a large blood BM this mroning  no pain noted, no n/v  She is passing flatus and BM  No sob    8/3: patient laying in bed, doing ok,   She continues to have bright red bleeding with some dark clots mixed in the BM  hgb this morning is 7.6 dropped from 8/1 when it was 11.7  Will obtain a nm bleeding scan, she may need a cta with IR embolization  Gi on board    Consultants/Specialty  GI    Code Status  dnr/dni    Disposition  tbd    Review of Systems  Review of Systems   Unable to perform ROS: Dementia   Constitutional:  Positive for weight loss.   HENT: Negative for ear discharge, sinus pain and tinnitus.    Eyes: Negative for double vision and pain.   Respiratory: Negative for hemoptysis and shortness of breath.    Cardiovascular: Negative for palpitations and claudication.   Gastrointestinal: Positive for abdominal pain and blood in stool. Negative for heartburn, nausea and vomiting.   Genitourinary: Negative for dysuria and hematuria.   Musculoskeletal: Negative for joint pain and neck pain.   Neurological: Negative for dizziness, tingling and tremors.   Psychiatric/Behavioral: Negative for depression, hallucinations and substance abuse.        Physical Exam  Temp:  [36.1 °C (96.9 °F)-36.7 °C (98 °F)] 36.1 °C (97 °F)  Pulse:  [63-77] 77  Resp:  [16-17] 16  BP: (109-135)/(51-66) 135/66  SpO2:  [96 %-100 %] 96 %    Physical Exam  Vitals signs reviewed.   Constitutional:       Appearance: Normal appearance. She is not diaphoretic.   HENT:      Head: Normocephalic and atraumatic.      Nose: Nose normal.      Mouth/Throat:      Mouth: Mucous membranes are moist.      Pharynx: No oropharyngeal exudate.   Eyes:      General: No scleral icterus.        Right eye: No discharge.         Left eye: No discharge.      Extraocular Movements: Extraocular movements intact.      Conjunctiva/sclera: Conjunctivae normal.   Neck:      Musculoskeletal: Neck supple. No muscular tenderness.   Cardiovascular:      Rate and Rhythm: Normal rate and regular rhythm.      Pulses:           Radial pulses are 2+ on the right side and 2+ on the left side.        Dorsalis pedis pulses are 2+ on the right side and 2+ on the left side.      Heart sounds: No murmur.   Pulmonary:      Effort: Pulmonary effort is normal. No respiratory distress.      Breath sounds: Normal breath sounds. No wheezing or rales.   Abdominal:      General: Bowel sounds are normal. There is no distension.      Palpations: Abdomen is soft.      Tenderness: There is abdominal tenderness  (LLQ).   Musculoskeletal:         General: No swelling or tenderness.      Right lower leg: Edema (trace bilateral lower leg edema) present.      Left lower leg: Edema present.   Lymphadenopathy:      Cervical: No cervical adenopathy.   Skin:     Coloration: Skin is pale. Skin is not jaundiced.   Neurological:      General: No focal deficit present.      Mental Status: She is oriented to person, place, and time. Mental status is at baseline.      Cranial Nerves: No cranial nerve deficit.   Psychiatric:         Mood and Affect: Mood normal.         Behavior: Behavior normal.         Fluids    Intake/Output Summary (Last 24 hours) at 8/3/2020 0842  Last data filed at 8/3/2020 0531  Gross per 24 hour   Intake 1416.67 ml   Output 0 ml   Net 1416.67 ml       Laboratory  Recent Labs     08/01/20  1220 08/02/20  0610 08/02/20  1233 08/02/20 2026 08/03/20  0456   WBC 7.8 5.4  --   --   --    RBC 3.90* 2.73*  --   --   --    HEMOGLOBIN 11.7* 8.3* 8.2* 7.5* 7.6*   HEMATOCRIT 37.0 26.1*  --   --   --    MCV 94.9 96.3  --   --   --    MCH 30.0 30.0  --   --   --    MCHC 31.6* 31.2*  --   --   --    RDW 51.3* 51.7*  --   --   --    PLATELETCT 202 142*  --   --   --    MPV 9.4 9.7  --   --   --      Recent Labs     08/01/20  1220 08/02/20  0610   SODIUM 136 138   POTASSIUM 3.8 4.3   CHLORIDE 96 103   CO2 28 28   GLUCOSE 113* 85   BUN 12 13   CREATININE 0.47* 0.35*   CALCIUM 10.4 9.1     Recent Labs     08/01/20  1220   APTT 28.2   INR 0.98               Imaging  IR-US GUIDED PIV   Final Result    Ultrasound-guided PERIPHERAL IV INSERTION performed by    qualified nursing staff as above.      CTA ABDOMEN PELVIS W & W/O POST PROCESS   Final Result      No active GI bleed is identified at this time.      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      Colonic diverticulosis.       Bilateral renal cysts.      Small hiatal hernia.      Multiple compression fractures in the spine.      Demineralization.                       Assessment/Plan  * Diverticulosis- (present on admission)  Assessment & Plan  Monitor Hgb  Hx of diverticular bleed ~ 1 yr ago; had a c-scope done 1 yr ago as well  Likely recurrent diverticular bleed  Rocephin for any possible divertculitis given her abdominal tenderness.  Check lactic acid given atheroslcerosis finding on CTA.  GI consulted by ED  Not on blood thinners  IV fluids.  Fluctuating BP in ED  INR 0.98    Observe bleeding for now as the CTA angiogram did now show a source of bleeding and GI does not plan for another c-scope since the one last year was noted.  hgb stable 7.6    NM bleeding scan pending further decision of IR vs GI procedure    Benign essential HTN- (present on admission)  Assessment & Plan  Metoprolol 25mg BID w/ parameters  Monitor vitals.    Acute blood loss anemia  Assessment & Plan  Diverticular bleed  Hx of this as well about 1 year ago  hgb at 7.6 today  Still having bloody BM  Gi on board  CTA angio neg  Monitor for now  Transfuse is symptomatic or if hgb drops <7    Still bleeding, will obtain NM bleeding scan- if it shows anything the nneeds CTA with embolization per IR  Till then monitor hgb closely    Atherosclerosis  Assessment & Plan  CT showing atherosclerosis with narrowing of the SMA artery as well as renal arteries.  No current renal failure.  Continue medical management with blood pressure control    DNR (do not resuscitate)  Assessment & Plan  DaughterMeme, states she her mother wants DNR/DNI    Hard of hearing  Assessment & Plan  Has hearing aids, hears better in left ear.  We will have to talk loudly and slowly on her left side.    Dementia (HCC)  Assessment & Plan  Daughter cares for her mother and states she is POA.    HLD (hyperlipidemia)- (present on admission)  Assessment & Plan  Healthy balanced  diet.    Hypothyroid- (present on admission)  Assessment & Plan  Levothyroxine 112 mcg daily  Last TSH:1.61 in past year       VTE prophylaxis: scds    Discussed plan of care with patient, nursing and GI  Monitor for now

## 2020-08-03 NOTE — CARE PLAN
Problem: Communication  Goal: The ability to communicate needs accurately and effectively will improve  Outcome: PROGRESSING AS EXPECTED   Participation in POC  Problem: Safety  Goal: Will remain free from injury  Outcome: PROGRESSING AS EXPECTED   Fall precautions in place  Problem: Skin Integrity  Goal: Risk for impaired skin integrity will decrease  Outcome: PROGRESSING AS EXPECTED   Waffle mattress, barrier cream, instigate Q2 turning  Problem: Bowel/Gastric:  Goal: Normal bowel function is maintained or improved  Outcome: PROGRESSING SLOWER THAN EXPECTED   Patient is tolerating diet, Bloody black stool.

## 2020-08-03 NOTE — PROGRESS NOTES
Bedside report received.  Assessment complete.  A&O x 4. Patient calls appropriately.  Patient ambulates with one assist and FWW. Bed alarm off.   Patient has 2/10 pain. Pain managed with prescribed medications.  Denies N&V. Tolerating clear liquid diet.  + void, + flatus, + BM, bloody.  Patient denies SOB.  SCD's on.  Review plan with of care with patient. Call light and personal belongings with in reach. Hourly rounding in place. All needs met at this time.   HPI:   Patient presents with:  Fever: New pt c/c fever on and off all winter  Other: last pap September 2018      Shahid Swan is a 32year old female presenting for:    Intermittetnly feeling feverish and sick and has been having fevers on and off Review of Systems       PHYSICAL EXAM:   /70   Pulse 100   Temp 98.4 °F (36.9 °C)   Resp 12   Ht 69\"   Wt 159 lb   LMP 03/04/2019 (Exact Date)   SpO2 99%   BMI 23.48 kg/m²  Estimated body mass index is 23.48 kg/m² as calculated from the following:

## 2020-08-04 ENCOUNTER — APPOINTMENT (OUTPATIENT)
Dept: RADIOLOGY | Facility: MEDICAL CENTER | Age: 85
DRG: 327 | End: 2020-08-04
Attending: FAMILY MEDICINE
Payer: MEDICARE

## 2020-08-04 ENCOUNTER — APPOINTMENT (OUTPATIENT)
Dept: RADIOLOGY | Facility: MEDICAL CENTER | Age: 85
DRG: 327 | End: 2020-08-04
Attending: INTERNAL MEDICINE
Payer: MEDICARE

## 2020-08-04 LAB
ABO GROUP BLD: NORMAL
BLD GP AB SCN SERPL QL: NORMAL
HGB BLD-MCNC: 7.1 G/DL (ref 12–16)
HGB BLD-MCNC: 7.2 G/DL (ref 12–16)
HGB BLD-MCNC: 8.4 G/DL (ref 12–16)
RH BLD: NORMAL

## 2020-08-04 PROCEDURE — 700111 HCHG RX REV CODE 636 W/ 250 OVERRIDE (IP): Performed by: FAMILY MEDICINE

## 2020-08-04 PROCEDURE — 85018 HEMOGLOBIN: CPT | Mod: 91

## 2020-08-04 PROCEDURE — 86901 BLOOD TYPING SEROLOGIC RH(D): CPT

## 2020-08-04 PROCEDURE — 700105 HCHG RX REV CODE 258

## 2020-08-04 PROCEDURE — 94760 N-INVAS EAR/PLS OXIMETRY 1: CPT

## 2020-08-04 PROCEDURE — 86900 BLOOD TYPING SEROLOGIC ABO: CPT

## 2020-08-04 PROCEDURE — C9113 INJ PANTOPRAZOLE SODIUM, VIA: HCPCS | Performed by: FAMILY MEDICINE

## 2020-08-04 PROCEDURE — 36415 COLL VENOUS BLD VENIPUNCTURE: CPT

## 2020-08-04 PROCEDURE — A9270 NON-COVERED ITEM OR SERVICE: HCPCS | Performed by: HOSPITALIST

## 2020-08-04 PROCEDURE — 700102 HCHG RX REV CODE 250 W/ 637 OVERRIDE(OP): Performed by: HOSPITALIST

## 2020-08-04 PROCEDURE — P9016 RBC LEUKOCYTES REDUCED: HCPCS

## 2020-08-04 PROCEDURE — 770006 HCHG ROOM/CARE - MED/SURG/GYN SEMI*

## 2020-08-04 PROCEDURE — A9560 TC99M LABELED RBC: HCPCS

## 2020-08-04 PROCEDURE — 30233N1 TRANSFUSION OF NONAUTOLOGOUS RED BLOOD CELLS INTO PERIPHERAL VEIN, PERCUTANEOUS APPROACH: ICD-10-PCS | Performed by: FAMILY MEDICINE

## 2020-08-04 PROCEDURE — 86850 RBC ANTIBODY SCREEN: CPT

## 2020-08-04 PROCEDURE — 86923 COMPATIBILITY TEST ELECTRIC: CPT

## 2020-08-04 PROCEDURE — 36430 TRANSFUSION BLD/BLD COMPNT: CPT

## 2020-08-04 PROCEDURE — 99232 SBSQ HOSP IP/OBS MODERATE 35: CPT | Performed by: FAMILY MEDICINE

## 2020-08-04 RX ORDER — SODIUM CHLORIDE 9 MG/ML
INJECTION, SOLUTION INTRAVENOUS
Status: COMPLETED
Start: 2020-08-04 | End: 2020-08-04

## 2020-08-04 RX ORDER — PANTOPRAZOLE SODIUM 40 MG/10ML
40 INJECTION, POWDER, LYOPHILIZED, FOR SOLUTION INTRAVENOUS DAILY
Status: DISCONTINUED | OUTPATIENT
Start: 2020-08-04 | End: 2020-08-10 | Stop reason: HOSPADM

## 2020-08-04 RX ADMIN — METOPROLOL TARTRATE 25 MG: 25 TABLET, FILM COATED ORAL at 06:03

## 2020-08-04 RX ADMIN — METOPROLOL TARTRATE 25 MG: 25 TABLET, FILM COATED ORAL at 16:59

## 2020-08-04 RX ADMIN — SODIUM CHLORIDE 500 ML: 9 INJECTION, SOLUTION INTRAVENOUS at 23:55

## 2020-08-04 RX ADMIN — PANTOPRAZOLE SODIUM 40 MG: 40 INJECTION, POWDER, LYOPHILIZED, FOR SOLUTION INTRAVENOUS at 15:28

## 2020-08-04 RX ADMIN — LEVOTHYROXINE SODIUM 112 MCG: 112 TABLET ORAL at 06:03

## 2020-08-04 ASSESSMENT — ENCOUNTER SYMPTOMS
NECK PAIN: 0
COUGH: 0
TINGLING: 0
BACK PAIN: 0
HEMOPTYSIS: 0
BLOOD IN STOOL: 1
ABDOMINAL PAIN: 0
DOUBLE VISION: 0
BLURRED VISION: 0
SHORTNESS OF BREATH: 0
DIAPHORESIS: 0
PHOTOPHOBIA: 0
CHILLS: 0
PALPITATIONS: 0
DIZZINESS: 0
MYALGIAS: 0
SPUTUM PRODUCTION: 0
ORTHOPNEA: 0
HEARTBURN: 0
FEVER: 0
NAUSEA: 0
HEADACHES: 0
VOMITING: 0

## 2020-08-04 NOTE — CARE PLAN
Problem: Communication  Goal: The ability to communicate needs accurately and effectively will improve  Outcome: PROGRESSING AS EXPECTED  Note: Pt updated on POC, all questions answered at his time      Problem: Safety  Goal: Will remain free from injury  Outcome: PROGRESSING AS EXPECTED  Note: Call light is within reach, treaded socks on, bed in lowest position, personal belongings are within reach, all needs met at this time

## 2020-08-04 NOTE — RESPIRATORY CARE
Oxygen Rounds      Patient found on    O2 L/m:  ____1_____    Oxygen device:  ___nc_____   Spo2: _____95____%        Respiratory device skin site inspection completed.

## 2020-08-04 NOTE — PROGRESS NOTES
Gastroenterology Progress Note     Author: Kelvin Franco M.D.     Date & Time Created: 8/4/2020 10:43 AM    Patient ID:  Name:             Марина Giron   YOB: 1931  Age:                 89 y.o.  female   MRN:               [id number]    Chief Complaint:  GI bleeding    Current Illness:  Марина Giron presents with lower GI bleeding.  In 5/2019 she had GI bleeding and Hgb declined from 10.2 to 8.1 on 5/15.  EGD found moderate gastritis.  Colonoscopy removed 4 adenomas and found diverticulosis.      In 9/2019 she had GI bleeding and Hgb declined from 11.9 to 7.4 on 10/2.  Nuclear medicine bleeding scan was negative.  The bleeding was attributed to diverticulosis.      On 8/1 she was hospitalized when she had 2 episodes of bright red blood mixed with small amounts of stool in her diaper.  Initial Hgb was 11.7 with MCV 94, and declined to 8.2 on 8/2.  CTA found no active bleeding.     APCT 9/2019: Prominent descending duodenal diverticulum.  Diverticulosis.     CTA 8/ 1/2020: No active bleeding.  Diverticulosis.  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    Nuclear medicine bleeding scan 8/3: No active bleeding.    COVID test: Negative on 8/2.    Interval History:     8/3: Her RN reported a small amount of maroon blood in the diaper yesterday.  Today she passed a moderate amount of maroon blood twice, at least 4 ounces each time.  No other symptoms.       8/4: Intermittently passing significant maroon blood.  She feels well and denies symptoms.  Hgb declined to 7.2 today.  The bleeding scan ordered for this morning was not performed, but the RN reordered it stat.       8/4 at 1750: Repeat nuclear medicine bleeding scan was negative.  Hgb 7.1.  Will proceed with EGD/enteroscopy tomorrow.  An informed consent discussion was completed today.    Assessment:  Recurrent lower GI bleeding  This problem has been attributed to diverticulosis.  The bleeding may be continuing.   Evaluate with a bleeding scan.    Plan:  Nuclear medicine bleeding scan: Also negative.  Proceed with EGD/enteroscopy.  If the bleeding scan is negative, then consider an EGD.     An informed consent discussion for EGD was completed today.    Labs:  Recent Labs     08/01/20  1220 08/02/20  0610  08/03/20  1255 08/03/20 2015 08/04/20  0412   WBC 7.8 5.4  --   --   --   --    RBC 3.90* 2.73*  --   --   --   --    HEMOGLOBIN 11.7* 8.3*   < > 7.6* 7.3* 7.2*   HEMATOCRIT 37.0 26.1*  --   --   --   --    MCV 94.9 96.3  --   --   --   --    MCH 30.0 30.0  --   --   --   --    MCHC 31.6* 31.2*  --   --   --   --    RDW 51.3* 51.7*  --   --   --   --    PLATELETCT 202 142*  --   --   --   --    MPV 9.4 9.7  --   --   --   --     < > = values in this interval not displayed.      Recent Labs     08/01/20  1220   APTT 28.2   INR 0.98   review  Recent Labs     08/01/20  1220 08/02/20  0610   SODIUM 136 138   POTASSIUM 3.8 4.3   CHLORIDE 96 103   CO2 28 28   GLUCOSE 113* 85   BUN 12 13   CREATININE 0.47* 0.35*   CALCIUM 10.4 9.1     Recent Labs     08/01/20  1220 08/02/20  0610   ALTSGPT 6  --    ASTSGOT 16  --    ALKPHOSPHAT 75  --    TBILIRUBIN 0.4  --    GLUCOSE 113* 85       Blood Culture   Date Value Ref Range Status   05/28/2009 No growth after 5 days of incubation.  Final        GI/Nutrition:  Orders Placed This Encounter   Procedures   • Diet Order Clear Liquid     Standing Status:   Standing     Number of Occurrences:   1     Order Specific Question:   Diet:     Answer:   Clear Liquid [10]       Hospital Medications:  IV infusions:  None  levothyroxine, 112 mcg, Oral, AM ES  metoprolol, 25 mg, Oral, BID  senna-docusate, 2 Tab, Oral, BID      senna-docusate **AND** polyethylene glycol/lytes **AND** magnesium hydroxide **AND** bisacodyl, acetaminophen, enalaprilat, ondansetron, ondansetron    Fluids:    Intake/Output Summary (Last 24 hours) at 8/3/2020 1236  Last data filed at 8/3/2020 0830  Gross per 24 hour   Intake  "1666.67 ml   Output 0 ml   Net 1666.67 ml          Past Medical History:   Past Medical History:   Diagnosis Date   • Chronic back pain    • Diverticulitis    • Hypertension    • Hypothyroid        Past Surgical History:  Past Surgical History:   Procedure Laterality Date   • GASTROSCOPY N/A 5/14/2019    Procedure: GASTROSCOPY;  Surgeon: Elvis Cunningham M.D.;  Location: SURGERY [facility];  Service: Gastroenterology   • COLONOSCOPY N/A 5/14/2019    Procedure: COLONOSCOPY;  Surgeon: Elvis Cunningham M.D.;  Location: SURGERY [facility];  Service: Gastroenterology   • COLONOSCOPY WITH POLYP  10/11/2014    Performed by Samuel Mejia M.D. at ENDOSCOPY [facility]   • COLONOSCOPY - ENDO  5/24/2009    Performed by KERRY PADGETT at ENDOSCOPY [facility]       Physical Exam:  Vitals/ General Appearance:   Weight/BMI: Body mass index is 30.62 kg/m².  /56   Pulse 71   Temp 37.1 °C (98.7 °F) (Temporal)   Resp 18   Ht 1.549 m (5' 1\")   Wt 73.5 kg (162 lb 0.6 oz)   SpO2 95%   Vitals:    08/04/20 0330 08/04/20 0603 08/04/20 0800 08/04/20 1019   BP: 122/55 126/57 119/56    Pulse: 88 83 77 71   Resp: 18  19 18   Temp: 36.9 °C (98.4 °F)  37.1 °C (98.7 °F)    TempSrc: Temporal  Temporal    SpO2: 96%  98% 95%   Weight:       Height:         Oxygen Therapy:  Pulse Oximetry: 95 %, O2 (LPM): 1, O2 Delivery Device: Silicone Nasal Cannula    Physical Exam   Constitutional: No distress.   HENT:   Head: Normocephalic and atraumatic.   Cardiovascular: Regular rhythm and normal heart sounds. Exam reveals no gallop.   No murmur heard.  Pulmonary/Chest:   Normal breath sounds anteriorly.   Abdominal: Soft. She exhibits no mass. There is no abdominal tenderness. There is no guarding.   Musculoskeletal:         General: No edema.   Psychiatric: Judgment normal.   Vitals reviewed.      Review of Systems:  Review of Systems   Respiratory: Negative for cough and shortness of breath.    Cardiovascular: Negative for " chest pain and palpitations.   Gastrointestinal: Positive for blood in stool. Negative for abdominal pain, melena and vomiting.       Medical Decision Making, by Problem:  Active Hospital Problems    Diagnosis   • *Diverticulosis [K57.90]   • Benign essential HTN [I10]   • Dementia (HCC) [F03.90]   • Hard of hearing [H91.90]   • DNR (do not resuscitate) [Z66]   • Atherosclerosis [I70.90]   • HLD (hyperlipidemia) [E78.5]   • Hypothyroid [E03.9]   • Acute blood loss anemia [D62]         Kelvin Franco M.D.

## 2020-08-04 NOTE — PROGRESS NOTES
Bedside report received.  Assessment complete.  A&O x 4. Patient calls appropriately.  Patient ambulates with one assist and FWW. Bed alarm off.   Patient has 0/10 pain. Pain managed with prescribed medications.  Denies N&V. Tolerating clear liquid diet.  + void, + flatus, - BM, bloody.  Patient denies SOB.  SCD's on.  Review plan with of care with patient. Call light and personal belongings with in reach. Hourly rounding in place. All needs met at this time.

## 2020-08-04 NOTE — PROGRESS NOTES
Report received from RN, assumed care at 0700  Pt is A0X4, and responds appropriately   Pt declines any SOB, chest pain, new onset of numbness/ tingiling  Pt rates pain at 3 /10, on a scale of 1-10, pt declines pain medication needs at this time   Pt isincontinate of urine and stool   Pt has + flatus, + bowel sounds, + BM 8/4/2020 large, bloody, tarry, clots  Pt ambulates with a x1 assist with a FWW   Pt is tolerating a clear liquid diet, pt denies any nausea/vomiting  Waffle mattress, Q2H turns and bed alarm placed for pt     Plan of care discussed, all questions answered. Explained importance of calling before getting OOB and pt verbalizes understanding. Explained importance of oral care. Call light is within reach, treaded slipper socks on, bed in lowest/ locked position, hourly rounding in place, all needs met at this time

## 2020-08-04 NOTE — PROGRESS NOTES
2 RN skin check complete     All bony prominences are intact, no skin abnormalities noted at this time     Pt bottom is pink and blanching     Bottom is pink and blanching, no signs of redness or skin breakdown at this time     Waffle overlay placed, Q2H turns started

## 2020-08-04 NOTE — PROGRESS NOTES
[facility] Medicine Daily Progress Note    Date of Service  8/4/2020    Chief Complaint  89 y.o. female admitted 8/1/2020 with gi bleed    Hospital Course     89 y.o. female with dementia, prior lower GI bleed from diverticuli, dyslipidemia, hypothyroidism who presented 8/1/2020 with bright red blood per rectum this morning.  Her daughter normally helps change her adult briefs for her and at 430 she went to help her and noticed clean briefs other than urine.  Then at 730 she went to change her briefs and the patient had foul-smelling bright red blood of clumps of stool in it.  There is no diarrhea.  The patient denies any heartburn she has not been taking any NSAIDs she had taken an occasional aspirin but not on a routine basis for headache.  She had had prior episodes in the past of diverticular bleed.  Patient was not having nausea or vomiting until she got to the hospital at which time she told her daughter she felt nauseous when she got into the ER she vomited up coffee that she drinks earlier.  The patient did have cream of wheat this morning.     Gastroenterologist Dr. Ryland Goins, and myself did evaluate patient together.  He felt that this is likely diverticular bleed we will monitor her.  He was okay with patient being on clear liquids.  If the patient has any acute bleeding again we will need to have a stat repeat CTA.         Interval Problem Update  Hemoglobin has been dropping    Consultants/Specialty  gi    Code Status  dnr    Disposition  pending    Review of Systems  Review of Systems   Constitutional: Negative for chills, diaphoresis and fever.   HENT: Positive for hearing loss. Negative for ear discharge and ear pain.    Eyes: Negative for blurred vision, double vision and photophobia.   Respiratory: Negative for cough, hemoptysis and sputum production.    Cardiovascular: Negative for chest pain, palpitations and orthopnea.   Gastrointestinal: Negative for heartburn, nausea and vomiting.    Genitourinary: Negative for dysuria, frequency and urgency.   Musculoskeletal: Negative for back pain, myalgias and neck pain.   Skin: Negative for itching.   Neurological: Negative for dizziness, tingling and headaches.        Physical Exam  Temp:  [36.4 °C (97.6 °F)-37.1 °C (98.7 °F)] 37.1 °C (98.7 °F)  Pulse:  [71-88] 71  Resp:  [18-19] 18  BP: (119-145)/(55-66) 119/56  SpO2:  [95 %-98 %] 95 %    Physical Exam  Constitutional:       Appearance: She is obese.   HENT:      Head: Normocephalic and atraumatic.      Nose: Nose normal.   Eyes:      Extraocular Movements: Extraocular movements intact.      Pupils: Pupils are equal, round, and reactive to light.   Neck:      Musculoskeletal: Normal range of motion and neck supple.   Cardiovascular:      Rate and Rhythm: Normal rate and regular rhythm.      Pulses: Normal pulses.      Heart sounds: Normal heart sounds.   Pulmonary:      Effort: Pulmonary effort is normal.      Breath sounds: Normal breath sounds.   Abdominal:      Palpations: Abdomen is soft.      Comments: obese   Skin:     General: Skin is warm and dry.   Neurological:      Mental Status: She is alert. Mental status is at baseline.         Fluids    Intake/Output Summary (Last 24 hours) at 8/4/2020 1256  Last data filed at 8/4/2020 0800  Gross per 24 hour   Intake 560 ml   Output --   Net 560 ml       Laboratory  Recent Labs     08/02/20  0610  08/03/20  1255 08/03/20 2015 08/04/20  0412   WBC 5.4  --   --   --   --    RBC 2.73*  --   --   --   --    HEMOGLOBIN 8.3*   < > 7.6* 7.3* 7.2*   HEMATOCRIT 26.1*  --   --   --   --    MCV 96.3  --   --   --   --    MCH 30.0  --   --   --   --    MCHC 31.2*  --   --   --   --    RDW 51.7*  --   --   --   --    PLATELETCT 142*  --   --   --   --    MPV 9.7  --   --   --   --     < > = values in this interval not displayed.     Recent Labs     08/02/20  0610   SODIUM 138   POTASSIUM 4.3   CHLORIDE 103   CO2 28   GLUCOSE 85   BUN 13   CREATININE 0.35*   CALCIUM  9.1                   Imaging       Assessment/Plan  * Diverticulosis- (present on admission)  Assessment & Plan  HEMOGLOBIN HAS been dropping  Seen with the nurse  Nuclear bleeding scan is pending  As per nurse's report she has dark stool now  Gi input is noted  ?of EGD    Benign essential HTN- (present on admission)  Assessment & Plan  Metoprolol 25mg BID w/ parameters  Monitor vitals.    Acute blood loss anemia  Assessment & Plan  ACUTE   2nd to gi bleed  Normocytic  Transfuse if hemoglobin is less then 7    Atherosclerosis  Assessment & Plan  CT showing atherosclerosis with narrowing of the SMA artery as well as renal arteries.  No current renal failure.  Continue medical management with blood pressure control    DNR (do not resuscitate)  Assessment & Plan  Daughter, Meme, states she her mother wants DNR/DNI    Hard of hearing  Assessment & Plan  Has hearing aids, hears better in left ear.  We will have to talk loudly and slowly on her left side.    Dementia (HCC)  Assessment & Plan  Daughter cares for her mother and states she is POA.    HLD (hyperlipidemia)- (present on admission)  Assessment & Plan  Healthy balanced diet.    Hypothyroid- (present on admission)  Assessment & Plan  Levothyroxine 112 mcg daily  Last TSH:1.61 in past year       VTE prophylaxis: scd

## 2020-08-05 ENCOUNTER — ANESTHESIA EVENT (OUTPATIENT)
Dept: SURGERY | Facility: MEDICAL CENTER | Age: 85
DRG: 327 | End: 2020-08-05
Payer: MEDICARE

## 2020-08-05 ENCOUNTER — ANESTHESIA (OUTPATIENT)
Dept: SURGERY | Facility: MEDICAL CENTER | Age: 85
DRG: 327 | End: 2020-08-05
Payer: MEDICARE

## 2020-08-05 LAB
ALBUMIN SERPL BCP-MCNC: 2.8 G/DL (ref 3.2–4.9)
ALBUMIN/GLOB SERPL: 1.2 G/DL
ALP SERPL-CCNC: 62 U/L (ref 30–99)
ALT SERPL-CCNC: 9 U/L (ref 2–50)
ANION GAP SERPL CALC-SCNC: 8 MMOL/L (ref 7–16)
AST SERPL-CCNC: 12 U/L (ref 12–45)
BASOPHILS # BLD AUTO: 0.3 % (ref 0–1.8)
BASOPHILS # BLD: 0.02 K/UL (ref 0–0.12)
BILIRUB SERPL-MCNC: 2.3 MG/DL (ref 0.1–1.5)
BODY FLD TYPE: NORMAL
BUN SERPL-MCNC: 11 MG/DL (ref 8–22)
CALCIUM SERPL-MCNC: 9.1 MG/DL (ref 8.5–10.5)
CHLORIDE SERPL-SCNC: 101 MMOL/L (ref 96–112)
CO2 SERPL-SCNC: 28 MMOL/L (ref 20–33)
CREAT SERPL-MCNC: 0.26 MG/DL (ref 0.5–1.4)
EOSINOPHIL # BLD AUTO: 0.18 K/UL (ref 0–0.51)
EOSINOPHIL NFR BLD: 2.6 % (ref 0–6.9)
ERYTHROCYTE [DISTWIDTH] IN BLOOD BY AUTOMATED COUNT: 50.9 FL (ref 35.9–50)
GLOBULIN SER CALC-MCNC: 2.4 G/DL (ref 1.9–3.5)
GLUCOSE SERPL-MCNC: 88 MG/DL (ref 65–99)
HCT VFR BLD AUTO: 25.4 % (ref 37–47)
HEMOCCULT SP1 SPEC QL: NEGATIVE
HGB BLD-MCNC: 6.9 G/DL (ref 12–16)
HGB BLD-MCNC: 8.2 G/DL (ref 12–16)
HGB BLD-MCNC: 8.8 G/DL (ref 12–16)
IMM GRANULOCYTES # BLD AUTO: 0.04 K/UL (ref 0–0.11)
IMM GRANULOCYTES NFR BLD AUTO: 0.6 % (ref 0–0.9)
LYMPHOCYTES # BLD AUTO: 1.73 K/UL (ref 1–4.8)
LYMPHOCYTES NFR BLD: 25.4 % (ref 22–41)
MCH RBC QN AUTO: 29.6 PG (ref 27–33)
MCHC RBC AUTO-ENTMCNC: 32.3 G/DL (ref 33.6–35)
MCV RBC AUTO: 91.7 FL (ref 81.4–97.8)
MONOCYTES # BLD AUTO: 0.81 K/UL (ref 0–0.85)
MONOCYTES NFR BLD AUTO: 11.9 % (ref 0–13.4)
MORPHOLOGY BLD-IMP: NORMAL
NEUTROPHILS # BLD AUTO: 4.03 K/UL (ref 2–7.15)
NEUTROPHILS NFR BLD: 59.2 % (ref 44–72)
NRBC # BLD AUTO: 0 K/UL
NRBC BLD-RTO: 0 /100 WBC
PLATELET # BLD AUTO: 144 K/UL (ref 164–446)
PMV BLD AUTO: 9.5 FL (ref 9–12.9)
POTASSIUM SERPL-SCNC: 3.8 MMOL/L (ref 3.6–5.5)
PROT SERPL-MCNC: 5.2 G/DL (ref 6–8.2)
RBC # BLD AUTO: 2.77 M/UL (ref 4.2–5.4)
SODIUM SERPL-SCNC: 137 MMOL/L (ref 135–145)
WBC # BLD AUTO: 6.8 K/UL (ref 4.8–10.8)

## 2020-08-05 PROCEDURE — 80053 COMPREHEN METABOLIC PANEL: CPT

## 2020-08-05 PROCEDURE — 700102 HCHG RX REV CODE 250 W/ 637 OVERRIDE(OP): Performed by: HOSPITALIST

## 2020-08-05 PROCEDURE — 85025 COMPLETE CBC W/AUTO DIFF WBC: CPT

## 2020-08-05 PROCEDURE — 700101 HCHG RX REV CODE 250: Performed by: INTERNAL MEDICINE

## 2020-08-05 PROCEDURE — 160046 HCHG PACU - 1ST 60 MINS PHASE II: Performed by: INTERNAL MEDICINE

## 2020-08-05 PROCEDURE — 99232 SBSQ HOSP IP/OBS MODERATE 35: CPT | Performed by: FAMILY MEDICINE

## 2020-08-05 PROCEDURE — 160009 HCHG ANES TIME/MIN: Performed by: INTERNAL MEDICINE

## 2020-08-05 PROCEDURE — 700111 HCHG RX REV CODE 636 W/ 250 OVERRIDE (IP): Performed by: ANESTHESIOLOGY

## 2020-08-05 PROCEDURE — 160048 HCHG OR STATISTICAL LEVEL 1-5: Performed by: INTERNAL MEDICINE

## 2020-08-05 PROCEDURE — A9270 NON-COVERED ITEM OR SERVICE: HCPCS | Performed by: HOSPITALIST

## 2020-08-05 PROCEDURE — 160035 HCHG PACU - 1ST 60 MINS PHASE I: Performed by: INTERNAL MEDICINE

## 2020-08-05 PROCEDURE — 160002 HCHG RECOVERY MINUTES (STAT): Performed by: INTERNAL MEDICINE

## 2020-08-05 PROCEDURE — 700105 HCHG RX REV CODE 258: Performed by: ANESTHESIOLOGY

## 2020-08-05 PROCEDURE — 160025 RECOVERY II MINUTES (STATS): Performed by: INTERNAL MEDICINE

## 2020-08-05 PROCEDURE — 160202 HCHG ENDO MINUTES - 1ST 30 MINS LEVEL 3: Performed by: INTERNAL MEDICINE

## 2020-08-05 PROCEDURE — 160207 HCHG ENDO MINUTES - EA ADDL 1 MIN LEVEL 3: Performed by: INTERNAL MEDICINE

## 2020-08-05 PROCEDURE — 700111 HCHG RX REV CODE 636 W/ 250 OVERRIDE (IP): Performed by: HOSPITALIST

## 2020-08-05 PROCEDURE — 700111 HCHG RX REV CODE 636 W/ 250 OVERRIDE (IP): Performed by: FAMILY MEDICINE

## 2020-08-05 PROCEDURE — 36415 COLL VENOUS BLD VENIPUNCTURE: CPT

## 2020-08-05 PROCEDURE — C9113 INJ PANTOPRAZOLE SODIUM, VIA: HCPCS | Performed by: FAMILY MEDICINE

## 2020-08-05 PROCEDURE — 82271 OCCULT BLOOD OTHER SOURCES: CPT

## 2020-08-05 PROCEDURE — 85018 HEMOGLOBIN: CPT | Mod: 91

## 2020-08-05 PROCEDURE — 0D998ZZ DRAINAGE OF DUODENUM, VIA NATURAL OR ARTIFICIAL OPENING ENDOSCOPIC: ICD-10-PCS | Performed by: INTERNAL MEDICINE

## 2020-08-05 PROCEDURE — 770006 HCHG ROOM/CARE - MED/SURG/GYN SEMI*

## 2020-08-05 RX ORDER — ONDANSETRON 2 MG/ML
4 INJECTION INTRAMUSCULAR; INTRAVENOUS
Status: DISCONTINUED | OUTPATIENT
Start: 2020-08-05 | End: 2020-08-05 | Stop reason: HOSPADM

## 2020-08-05 RX ORDER — DIPHENHYDRAMINE HYDROCHLORIDE 50 MG/ML
12.5 INJECTION INTRAMUSCULAR; INTRAVENOUS
Status: DISCONTINUED | OUTPATIENT
Start: 2020-08-05 | End: 2020-08-05 | Stop reason: HOSPADM

## 2020-08-05 RX ORDER — SODIUM CHLORIDE, SODIUM LACTATE, POTASSIUM CHLORIDE, CALCIUM CHLORIDE 600; 310; 30; 20 MG/100ML; MG/100ML; MG/100ML; MG/100ML
INJECTION, SOLUTION INTRAVENOUS
Status: DISCONTINUED | OUTPATIENT
Start: 2020-08-05 | End: 2020-08-05 | Stop reason: SURG

## 2020-08-05 RX ORDER — SODIUM CHLORIDE, SODIUM LACTATE, POTASSIUM CHLORIDE, CALCIUM CHLORIDE 600; 310; 30; 20 MG/100ML; MG/100ML; MG/100ML; MG/100ML
INJECTION, SOLUTION INTRAVENOUS CONTINUOUS
Status: DISCONTINUED | OUTPATIENT
Start: 2020-08-05 | End: 2020-08-05 | Stop reason: HOSPADM

## 2020-08-05 RX ORDER — HALOPERIDOL 5 MG/ML
1 INJECTION INTRAMUSCULAR
Status: DISCONTINUED | OUTPATIENT
Start: 2020-08-05 | End: 2020-08-05 | Stop reason: HOSPADM

## 2020-08-05 RX ORDER — LABETALOL HYDROCHLORIDE 5 MG/ML
5 INJECTION, SOLUTION INTRAVENOUS
Status: DISCONTINUED | OUTPATIENT
Start: 2020-08-05 | End: 2020-08-05 | Stop reason: HOSPADM

## 2020-08-05 RX ADMIN — PROPOFOL 30 MG: 10 INJECTION, EMULSION INTRAVENOUS at 10:11

## 2020-08-05 RX ADMIN — PROPOFOL 30 MG: 10 INJECTION, EMULSION INTRAVENOUS at 10:17

## 2020-08-05 RX ADMIN — METOPROLOL TARTRATE 25 MG: 25 TABLET, FILM COATED ORAL at 04:45

## 2020-08-05 RX ADMIN — PROPOFOL 30 MG: 10 INJECTION, EMULSION INTRAVENOUS at 10:31

## 2020-08-05 RX ADMIN — PROPOFOL 30 MG: 10 INJECTION, EMULSION INTRAVENOUS at 10:24

## 2020-08-05 RX ADMIN — PROPOFOL 30 MG: 10 INJECTION, EMULSION INTRAVENOUS at 10:14

## 2020-08-05 RX ADMIN — ACETAMINOPHEN 650 MG: 325 TABLET, FILM COATED ORAL at 06:46

## 2020-08-05 RX ADMIN — FENTANYL CITRATE 50 MCG: 50 INJECTION INTRAMUSCULAR; INTRAVENOUS at 10:11

## 2020-08-05 RX ADMIN — PROPOFOL 30 MG: 10 INJECTION, EMULSION INTRAVENOUS at 10:27

## 2020-08-05 RX ADMIN — PROPOFOL 30 MG: 10 INJECTION, EMULSION INTRAVENOUS at 10:21

## 2020-08-05 RX ADMIN — FENTANYL CITRATE 25 MCG: 50 INJECTION INTRAMUSCULAR; INTRAVENOUS at 10:27

## 2020-08-05 RX ADMIN — METOPROLOL TARTRATE 25 MG: 25 TABLET, FILM COATED ORAL at 17:42

## 2020-08-05 RX ADMIN — PANTOPRAZOLE SODIUM 40 MG: 40 INJECTION, POWDER, LYOPHILIZED, FOR SOLUTION INTRAVENOUS at 04:45

## 2020-08-05 RX ADMIN — ONDANSETRON 4 MG: 2 INJECTION INTRAMUSCULAR; INTRAVENOUS at 12:05

## 2020-08-05 RX ADMIN — SODIUM CHLORIDE, POTASSIUM CHLORIDE, SODIUM LACTATE AND CALCIUM CHLORIDE: 600; 310; 30; 20 INJECTION, SOLUTION INTRAVENOUS at 10:05

## 2020-08-05 RX ADMIN — LEVOTHYROXINE SODIUM 112 MCG: 112 TABLET ORAL at 04:45

## 2020-08-05 RX ADMIN — POLYETHYLENE GLYCOL 3350, SODIUM SULFATE ANHYDROUS, SODIUM BICARBONATE, SODIUM CHLORIDE, POTASSIUM CHLORIDE 4 L: 236; 22.74; 6.74; 5.86; 2.97 POWDER, FOR SOLUTION ORAL at 15:32

## 2020-08-05 RX ADMIN — DOCUSATE SODIUM 50 MG AND SENNOSIDES 8.6 MG 2 TABLET: 8.6; 5 TABLET, FILM COATED ORAL at 17:42

## 2020-08-05 ASSESSMENT — ENCOUNTER SYMPTOMS
COUGH: 0
CLAUDICATION: 0
NAUSEA: 0
HEMOPTYSIS: 0
BLOOD IN STOOL: 1
BACK PAIN: 0
DIAPHORESIS: 0
PHOTOPHOBIA: 0
SHORTNESS OF BREATH: 0
SPUTUM PRODUCTION: 0
DOUBLE VISION: 0
VOMITING: 0
PALPITATIONS: 0
CHILLS: 0
MYALGIAS: 0
ABDOMINAL PAIN: 0
TINGLING: 0
NECK PAIN: 0
TREMORS: 0
EYE PAIN: 0
HEADACHES: 0
ORTHOPNEA: 0

## 2020-08-05 ASSESSMENT — PAIN SCALES - GENERAL: PAIN_LEVEL: 4

## 2020-08-05 NOTE — PROGRESS NOTES
Hospital Medicine Daily Progress Note    Date of Service  8/5/2020    Chief Complaint  89 y.o. female admitted 8/1/2020 with gi bleed    Hospital Course     89 y.o. female with dementia, prior lower GI bleed from diverticuli, dyslipidemia, hypothyroidism who presented 8/1/2020 with bright red blood per rectum this morning.  Her daughter normally helps change her adult briefs for her and at 430 she went to help her and noticed clean briefs other than urine.  Then at 730 she went to change her briefs and the patient had foul-smelling bright red blood of clumps of stool in it.  There is no diarrhea.  The patient denies any heartburn she has not been taking any NSAIDs she had taken an occasional aspirin but not on a routine basis for headache.  She had had prior episodes in the past of diverticular bleed.  Patient was not having nausea or vomiting until she got to the hospital at which time she told her daughter she felt nauseous when she got into the ER she vomited up coffee that she drinks earlier.  The patient did have cream of wheat this morning.     Gastroenterologist Dr. Ryland Goins, and myself did evaluate patient together.  He felt that this is likely diverticular bleed we will monitor her.  He was okay with patient being on clear liquids.  If the patient has any acute bleeding again we will need to have a stat repeat CTA.         Interval Problem Update  Hemoglobin has been dropping  S/p prbc transfusion    Consultants/Specialty  gi    Code Status  dnr    Disposition  pending    Review of Systems  Review of Systems   Constitutional: Negative for chills, diaphoresis and malaise/fatigue.   HENT: Positive for hearing loss. Negative for ear discharge and nosebleeds.    Eyes: Negative for double vision, photophobia and pain.   Respiratory: Negative for hemoptysis, sputum production and shortness of breath.    Cardiovascular: Negative for palpitations, orthopnea and claudication.   Gastrointestinal: Negative for  abdominal pain, nausea and vomiting.   Genitourinary: Negative for frequency, hematuria and urgency.   Musculoskeletal: Negative for back pain, myalgias and neck pain.   Skin: Negative for itching.   Neurological: Negative for tingling, tremors and headaches.        Physical Exam  Temp:  [36.1 °C (97 °F)-37 °C (98.6 °F)] 36.4 °C (97.5 °F)  Pulse:  [71-95] 81  Resp:  [16-18] 18  BP: (111-145)/(57-75) 135/71  SpO2:  [91 %-97 %] 97 %    Physical Exam  Constitutional:       General: She is not in acute distress.     Appearance: She is not toxic-appearing.   HENT:      Head: Normocephalic and atraumatic.      Nose: No congestion or rhinorrhea.   Eyes:      Conjunctiva/sclera: Conjunctivae normal.      Pupils: Pupils are equal, round, and reactive to light.   Neck:      Musculoskeletal: No neck rigidity or muscular tenderness.   Cardiovascular:      Rate and Rhythm: Normal rate and regular rhythm.      Heart sounds: No murmur. No friction rub.   Pulmonary:      Effort: No respiratory distress.      Breath sounds: No stridor.   Abdominal:      Palpations: Abdomen is soft.      Tenderness: There is no abdominal tenderness. There is no guarding.      Comments: obese   Skin:     Coloration: Skin is not jaundiced or pale.   Neurological:      Mental Status: She is alert. Mental status is at baseline.         Fluids    Intake/Output Summary (Last 24 hours) at 8/5/2020 0905  Last data filed at 8/4/2020 2024  Gross per 24 hour   Intake 605 ml   Output --   Net 605 ml       Laboratory  Recent Labs     08/04/20  1507 08/04/20 2013 08/05/20  0359   WBC  --   --  6.8   RBC  --   --  2.77*   HEMOGLOBIN 7.1* 8.4* 8.2*   HEMATOCRIT  --   --  25.4*   MCV  --   --  91.7   MCH  --   --  29.6   MCHC  --   --  32.3*   RDW  --   --  50.9*   PLATELETCT  --   --  144*   MPV  --   --  9.5     Recent Labs     08/05/20  0359   SODIUM 137   POTASSIUM 3.8   CHLORIDE 101   CO2 28   GLUCOSE 88   BUN 11   CREATININE 0.26*   CALCIUM 9.1                    Imaging       Assessment/Plan  * Diverticulosis- (present on admission)  Assessment & Plan  H/h dropped  Seen with the nurse  Nuclear bleeding scan is negative  As per nurse's report she has dark stool now  Gi input is noted  ?of EGD  S/p prbc transfusion    Benign essential HTN- (present on admission)  Assessment & Plan  Metoprolol 25mg BID w/ parameters  Monitor vitals.    Acute blood loss anemia  Assessment & Plan  ACUTE   2nd to gi bleed  Normocytic  Transfuse if hemoglobin is less then 7  S/p prbc transfusion    Atherosclerosis  Assessment & Plan  CT showing atherosclerosis with narrowing of the SMA artery as well as renal arteries.  No current renal failure.  Continue medical management with blood pressure control    DNR (do not resuscitate)  Assessment & Plan  Daughter, Meme, states she her mother wants DNR/DNI    Hard of hearing  Assessment & Plan  Has hearing aids, hears better in left ear.  We will have to talk loudly and slowly on her left side.    Dementia (HCC)  Assessment & Plan  Daughter cares for her mother and states she is POA.    HLD (hyperlipidemia)- (present on admission)  Assessment & Plan  Healthy balanced diet.    Hypothyroid- (present on admission)  Assessment & Plan  Levothyroxine 112 mcg daily  Last TSH:1.61 in past year       VTE prophylaxis: scd

## 2020-08-05 NOTE — PROGRESS NOTES
Gastroenterology Progress Note     Author: Kelvin Franco M.D.     Date & Time Created: 8/5/2020 7:37 AM    Patient ID:  Name:             Марина Giron   YOB: 1931  Age:                 89 y.o.  female   MRN:               [id number]    Chief Complaint:  GI bleeding    Current Illness:  Марина Giron presents with lower GI bleeding.       In 5/2019 she had GI bleeding and Hgb declined from 10.2 to 8.1 on 5/15.  EGD found moderate gastritis. Colonoscopy removed 4 adenomas and found diverticulosis.      In 9/2019 she had GI bleeding and Hgb declined from 11.9 to 7.4 on 10/2.  Nuclear medicine bleeding scan was negative.  The bleeding was attributed to diverticulosis.      On 8/1 she was hospitalized when she had 2 episodes of bright red blood mixed with small amounts of stool in her diaper.  Initial Hgb was 11.7 with MCV 94, and declined to 8.2 on 8/2.  CTA found no active bleeding.     APCT 9/2019: Prominent descending duodenal diverticulum.  Diverticulosis.     CTA 8/ 1/2020: No active bleeding.  Diverticulosis.  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    Nuclear medicine bleeding scan 8/3: No active bleeding.    COVID test: Negative on 8/2.    EGD/enteroscopy 8/5  1.  Nonobstructing Schatzki's ring  2.  Large descending duodenal diverticulum with bile draining from the distal aspect.  The papilla of Vater was obscured by a fold.  The endoscopic view suggested blood in the bile, but the fluid was captured in a suction trap, and appeared to be dark bile without blood.  3.  A cause of GI bleeding was not found during EGD/enteroscopy     Interval History:     8/3: Her RN reported a small amount of maroon blood in the diaper yesterday.  Today she passed a moderate amount of maroon blood twice, at least 4 ounces each time.  No other symptoms.       8/4: Intermittently passing significant maroon blood.  She feels well and denies symptoms.  Hgb declined to 7.2 today.  The  bleeding scan ordered for this morning was not performed, but the RN reordered it stat.       8/4 at 1750: Repeat nuclear medicine bleeding scan was negative.  Hgb 7.1.  Will proceed with EGD/enteroscopy tomorrow.  An informed consent discussion was completed today.       8/5 at 0740: No symptoms.  Did not pass maroon stool during the night.  She took part of a clear liquid diet, but it was removed at 0730.  Hgb 8.2 after 1 unit PRBC.  Proceed with EGD/enteroscopy.  Could not reach her daughter Meme last night or again this morning.    Assessment:  Recurrent GI bleeding with maroon stool  CTA negative on 8/1.  Bleeding scan negative on 8/3 at 8/4.  EGD/enteroscopy on 8/5 did not find a source of bleeding.     Evaluate with colonoscopy if she agrees.    Plan:  Evaluate with colonoscopy if she agrees.      Labs:  Recent Labs     08/04/20  1507 08/04/20 2013 08/05/20  0359   WBC  --   --  6.8   RBC  --   --  2.77*   HEMOGLOBIN 7.1* 8.4* 8.2*   HEMATOCRIT  --   --  25.4*   MCV  --   --  91.7   MCH  --   --  29.6   MCHC  --   --  32.3*   RDW  --   --  50.9*   PLATELETCT  --   --  144*   MPV  --   --  9.5        review  Recent Labs     08/05/20  0359   SODIUM 137   POTASSIUM 3.8   CHLORIDE 101   CO2 28   GLUCOSE 88   BUN 11   CREATININE 0.26*   CALCIUM 9.1     Recent Labs     08/05/20  0359   ALTSGPT 9   ASTSGOT 12   ALKPHOSPHAT 62   TBILIRUBIN 2.3*   GLUCOSE 88       Blood Culture   Date Value Ref Range Status   05/28/2009 No growth after 5 days of incubation.  Final        GI/Nutrition:  Orders Placed This Encounter   Procedures   • Diet Order Clear Liquids - No Red Foods     Standing Status:   Standing     Number of Occurrences:   1     Order Specific Question:   Diet:     Answer:   Clear Liquids - No Red Foods [12]   • Diet NPO     No Red Foods. Sips of clear liquids to take medications up to 2 hours prior to procedure.     Standing Status:   Standing     Number of Occurrences:   8     Order Specific Question:    "Restrict to:     Answer:   Sips with Medications [3]       Hospital Medications:  IV infusions:  None  pantoprazole, 40 mg, Intravenous, DAILY  levothyroxine, 112 mcg, Oral, AM ES  metoprolol, 25 mg, Oral, BID  senna-docusate, 2 Tab, Oral, BID      senna-docusate **AND** polyethylene glycol/lytes **AND** magnesium hydroxide **AND** bisacodyl, acetaminophen, enalaprilat, ondansetron, ondansetron    Fluids:    Intake/Output Summary (Last 24 hours) at 8/3/2020 1236  Last data filed at 8/3/2020 0830  Gross per 24 hour   Intake 1666.67 ml   Output 0 ml   Net 1666.67 ml          Past Medical History:   Past Medical History:   Diagnosis Date   • Chronic back pain    • Diverticulitis    • Hypertension    • Hypothyroid        Past Surgical History:  Past Surgical History:   Procedure Laterality Date   • GASTROSCOPY N/A 5/14/2019    Procedure: GASTROSCOPY;  Surgeon: Elvis Cunningham M.D.;  Location: SURGERY [facility];  Service: Gastroenterology   • COLONOSCOPY N/A 5/14/2019    Procedure: COLONOSCOPY;  Surgeon: Elvis Cunningham M.D.;  Location: SURGERY [facility];  Service: Gastroenterology   • COLONOSCOPY WITH POLYP  10/11/2014    Performed by Samuel Mejia M.D. at ENDOSCOPY [facility]   • COLONOSCOPY - ENDO  5/24/2009    Performed by KERRY PADGETT at ENDOSCOPY [facility]       Physical Exam:  Vitals/ General Appearance:   Weight/BMI: Body mass index is 30.62 kg/m².  /71   Pulse 81   Temp 36.4 °C (97.5 °F) (Temporal)   Resp 18   Ht 1.549 m (5' 1\")   Wt 73.5 kg (162 lb 0.6 oz)   SpO2 97%   Vitals:    08/04/20 1830 08/04/20 1845 08/04/20 2024 08/05/20 0425   BP: 111/63 118/57 145/75 135/71   Pulse: 79 95 78 81   Resp: 16 16 16 18   Temp: 36.1 °C (97 °F)  36.6 °C (97.9 °F) 36.4 °C (97.5 °F)   TempSrc:  Temporal Temporal Temporal   SpO2: 93% 92% 91% 97%   Weight:       Height:         Oxygen Therapy:  Pulse Oximetry: 97 %, O2 (LPM): 1, O2 Delivery Device: Nasal Cannula    Physical Exam "   Constitutional: No distress.   HENT:   Head: Normocephalic and atraumatic.   Cardiovascular: Regular rhythm. Exam reveals no gallop.   Murmur heard.  Normal S1 and S2.  No S3 or S4.  Grade 1/6 systolic murmur at the LSB, radiating to the apex.   Pulmonary/Chest:   Normal breath sounds anteriorly.   Abdominal: Soft. She exhibits no mass. There is abdominal tenderness. There is no guarding.   Mild diffuse abdominal tenderness without guarding.   Musculoskeletal:         General: No edema.   Psychiatric: Judgment normal.   Vitals reviewed.      Review of Systems:  Review of Systems   Respiratory: Negative for cough and shortness of breath.    Cardiovascular: Negative for chest pain and palpitations.   Gastrointestinal: Positive for blood in stool. Negative for abdominal pain, melena and vomiting.       Medical Decision Making, by Problem:  Active Hospital Problems    Diagnosis   • *Diverticulosis [K57.90]   • Benign essential HTN [I10]   • Dementia (HCC) [F03.90]   • Hard of hearing [H91.90]   • DNR (do not resuscitate) [Z66]   • Atherosclerosis [I70.90]   • HLD (hyperlipidemia) [E78.5]   • Hypothyroid [E03.9]   • Acute blood loss anemia [D62]         Kelvin Franco M.D.

## 2020-08-05 NOTE — ANESTHESIA TIME REPORT
Anesthesia Start and Stop Event Times     Date Time Event    8/5/2020 0955 Ready for Procedure     1005 Anesthesia Start     1045 Anesthesia Stop        Responsible Staff  08/05/20    Name Role Begin End    Erick Flores M.D. Anesth 1005 1045        Preop Diagnosis (Free Text):  Pre-op Diagnosis     gi bleed        Preop Diagnosis (Codes):    Post op Diagnosis  GI bleed      Premium Reason  Non-Premium    Comments:

## 2020-08-05 NOTE — ANESTHESIA PREPROCEDURE EVALUATION
Relevant Problems   CARDIAC   (+) Atherosclerosis   (+) Benign essential HTN      ENDO   (+) Hypothyroid       Physical Exam    Airway   Mallampati: II  TM distance: >3 FB  Neck ROM: full       Cardiovascular - normal exam  Rhythm: regular  Rate: normal  (-) murmur     Dental - normal exam  (+) upper dentures, lower dentures           Pulmonary - normal exam  Breath sounds clear to auscultation     Abdominal    Neurological - normal exam                 Anesthesia Plan    ASA 2       Plan - MAC             Induction: intravenous    Postoperative Plan: Postoperative administration of opioids is intended.    Pertinent diagnostic labs and testing reviewed    Informed Consent:    Anesthetic plan and risks discussed with patient.    Use of blood products discussed with: patient whom consented to blood products.

## 2020-08-05 NOTE — DISCHARGE PLANNING
Care Transition Team Discharge Planning       Discharge Plan:  TBD     Pt was P/EGD with Enteroscopy today.  This RN CM will continue to follow and assist with discharge as needed.

## 2020-08-05 NOTE — OR NURSING
0930-[place] VSS, left wrist IV flushed-flushed well. Pt denies pain, a,a,& o x3. Verbalizes the planned procedure and wishes to proceed.  Pt [place], no hearing aids available in pre-op.  Reviewed the plan of care with the pt. Consent for the procedure signed.

## 2020-08-05 NOTE — PROGRESS NOTES
Progress Note #2    She feels well.  I explained the EGD/enteroscopy findings, and recommended colonoscopy to evaluate for a colonic source of bleeding.  An informed consent discussion for colonoscopy was completed, and she agreed.  She wants to try to find the source of the bleeding, particularly because the bleeding has been recurrent.       When I entered the room, she was talking with her daughter Meme.  I discussed her condition with Meme, along with the recommendation for colonoscopy.  The discussion included an informed consent for colonoscopy, and she also agreed.    Plan  Arrange for colonoscopy, hopefully tomorrow, depending on how well she tolerates the prep.    Kelvin Franco MD  8-5-2020

## 2020-08-05 NOTE — PROGRESS NOTES
Bedside report received.  Assessment complete.  A&O x 4. Patient calls appropriately.  Patient ambulates with one assist. Bed alarm off.   Patient has 0/10 pain. Pain managed with prescribed medications.  Denies N&V. Tolerating clear liquid diet.  + void, + flatus, + BM. Pt is incontinent of both urine and stool.   Patient denies SOB.  SCD's on.  Review plan with of care with patient. Call light and personal belongings with in reach. Hourly rounding in place. All needs met at this time.

## 2020-08-05 NOTE — PROGRESS NOTES
Dr. Knight updated that pt's last hemoglobin was 7.1, per MD transfuse 1 unit PRBC. Continue Q6H hemoglobin draws. Dr. Franco from GI also updated on pt's dropping hemoglobin.

## 2020-08-05 NOTE — OR NURSING
1055 Received pt from Karen RN, assumed care of pt at this time. Pt awake, resting comfortably in bed. Pt is very hard of hearing, BP increasing towards normal limits.    1105 Dr. Ayala spoke with pt.     1115 Pt meets criteria for discharge from PACU.     1120 Report to floor [place].     1135 Pt c/o pain to left arm, no signs of injury. Pt.'s IV flushed, no signs of infiltration.     1147 Pt to floor with transport.

## 2020-08-05 NOTE — OR SURGEON
Immediate Post OP Note    PreOp Diagnosis: GI bleeding with maroon stool    PostOp Diagnosis: A cause of GI bleeding was not found during the EGD/enteroscopy    Procedure(s):  GASTROSCOPY - Wound Class: Clean Contaminated    Surgeon(s):  Kelvin Franco M.D.    Anesthesiologist/Type of Anesthesia:  Anesthesiologist: Erick Flores M.D./General    Surgical Staff:  Endoscopy Technician: Shantel Frazier  Endoscopy Nurse: Gloria Grady R.N.    Specimens removed if any:  ID Type Source Tests Collected by Time Destination   A : Biliary FLUID CHECK FOR BLOOD  Tissue Gastric PATHOLOGY SPECIMEN Kelvin Franco M.D. 8/5/2020 10:26 AM        Estimated Blood Loss: None    Findings:   1.  Nonobstructing Schatzki's ring  2.  Large descending duodenal diverticulum with bile draining from the distal aspect.  The papilla of Vater was obscured by a fold.  The endoscopic view suggested blood in the bile, but the fluid was captured in a suction trap, and appeared to be dark bile without blood.  3.  A cause of GI bleeding was not found during EGD/enteroscopy    Complications: None        8/5/2020 10:41 AM Kelvin Franco M.D.

## 2020-08-05 NOTE — OR NURSING
1040- Pt to PACU from OR. Report from anesthesia and OR RN. 6L O2 via mask. No airway. Respirations even and unlabored. VSS.      1055- report to Arti RN for break

## 2020-08-05 NOTE — ANESTHESIA POSTPROCEDURE EVALUATION
Patient: Марина Giron    Procedure Summary     Date: 08/05/20 Room / Location: [facility] ROOM 26 / SURGERY SAME DAY [facility]    Anesthesia Start: 1005 Anesthesia Stop: 1045    Procedure: GASTROSCOPY (N/A Esophagus) Diagnosis: (no source of bleeding)    Surgeon: Kelvin Franco M.D. Responsible Provider: Erick Flores M.D.    Anesthesia Type: MAC ASA Status: 2          Final Anesthesia Type: MAC  Last vitals  BP   Blood Pressure : 119/52    Temp   36.3 °C (97.4 °F)    Pulse   Pulse: 65   Resp   17    SpO2   95 %      Anesthesia Post Evaluation    Patient location during evaluation: PACU  Patient participation: complete - patient participated  Level of consciousness: awake and alert  Pain score: 4    Airway patency: patent  Anesthetic complications: no  Cardiovascular status: hemodynamically stable  Respiratory status: acceptable  Hydration status: euvolemic    PONV: none           Nurse Pain Score: 4 (NPRS)

## 2020-08-05 NOTE — OP REPORT
EGD PROCEDURE NOTE:    Kelvin Franco M.D.  Date & Time note created:    8/5/2020   10:49 AM       Patient ID:  Name:             Марина Giron   YOB: 1931  Age:                 89 y.o.  female   MRN:               [id number]     PROCEDURE: EGD with enteroscopy       SURGEON: Kelvin Franco M.D.     PREPROCEDURE DIAGNOSIS: GI bleeding with maroon stool    POSTPROCEDURE DIAGNOSIS: A cause of GI bleeding was not found during EGD/enteroscopy    SPECIMENS   Biliary fluid: Check for blood.    FINDINGS   1.  Nonobstructing Schatzki's ring  2.  Large descending duodenal diverticulum with bile draining from the distal aspect.  The papilla of Vater was obscured by a fold.  The endoscopic view suggested blood in the bile, but the fluid was captured in a suction trap, and appeared to be dark bile without blood.  3.  A cause of GI bleeding was not found during EGD/enteroscopy     IMPRESSION   GI bleeding with maroon stool  Evaluate with colonoscopy if she agrees.    PLAN   Discussed the option of colonoscopy with her    COMPLICATIONS: None    PHYSICAL EXAM    Lungs: Clear to auscultation anteriorly.   Heart: Normal heart sounds. No murmur.   Abdomen: No masses, tenderness or organomegaly.     CONSENT   The EGD and enteroscopy procedure was explained to the patient, including the indications, risks, benefits, alternatives and method of performing the procedure. Questions about the procedure were solicited and answered to the patient’s satisfaction, who appeared to understand and agreed to proceed with it.     EGD PROCEDURE   After signed informed consent, the patient was placed in the left lateral decubitus position, and deep sedation was administered by the anesthesiologist.  The flexible videoendoscope was introduced into the oropharynx, and advance through the esophagus and stomach, and into the descending duodenum without difficulty.  On withdrawal of the endoscope, there was a large  descending duodenal diverticulum, with bile draining from the distal aspect.  The papilla of Vater itself was obscured by a fold.  The endoscopic view suggested blood in the bile, but the fluid was captured in a suction trap, and appeared to be dark bile without blood.  It was sent to the lab to check for blood.  On withdrawal of the endoscope the descending duodenum and bulb of the duodenum were otherwise normal.  The pylorus was normal.  The gastric mucosa was normal in its entirety.  The gastroesophageal junction on forward and retroflexed view demonstrated a nonobstructing Schatzki's ring.  The esophageal mucosa was otherwise normal.     Subsequently, the Olympus pediatric video colonoscope was introduced into the oropharynx and advanced through the esophagus and stomach and into the small bowel to 140 cm, at which point, the colonoscope would no longer slide forward.  On withdrawal of the colonoscope, the visualized small bowel was unremarkable.  The pylorus was present at 65 cm.  The gastric lumen was deflated and the endoscope withdrawn.  The patient seemed to tolerate the procedure well, and there were no immediate complications.       Kelvin Franco M.D.

## 2020-08-06 ENCOUNTER — ANESTHESIA (OUTPATIENT)
Dept: SURGERY | Facility: MEDICAL CENTER | Age: 85
DRG: 327 | End: 2020-08-06
Payer: MEDICARE

## 2020-08-06 ENCOUNTER — ANESTHESIA EVENT (OUTPATIENT)
Dept: SURGERY | Facility: MEDICAL CENTER | Age: 85
DRG: 327 | End: 2020-08-06
Payer: MEDICARE

## 2020-08-06 LAB
ALBUMIN SERPL BCP-MCNC: 3.2 G/DL (ref 3.2–4.9)
ALBUMIN/GLOB SERPL: 1.2 G/DL
ALP SERPL-CCNC: 74 U/L (ref 30–99)
ALT SERPL-CCNC: 9 U/L (ref 2–50)
ANION GAP SERPL CALC-SCNC: 11 MMOL/L (ref 7–16)
AST SERPL-CCNC: 14 U/L (ref 12–45)
BASOPHILS # BLD AUTO: 0.3 % (ref 0–1.8)
BASOPHILS # BLD: 0.03 K/UL (ref 0–0.12)
BILIRUB SERPL-MCNC: 0.5 MG/DL (ref 0.1–1.5)
BUN SERPL-MCNC: 7 MG/DL (ref 8–22)
CALCIUM SERPL-MCNC: 9.2 MG/DL (ref 8.5–10.5)
CHLORIDE SERPL-SCNC: 98 MMOL/L (ref 96–112)
CO2 SERPL-SCNC: 28 MMOL/L (ref 20–33)
CREAT SERPL-MCNC: 0.36 MG/DL (ref 0.5–1.4)
EOSINOPHIL # BLD AUTO: 0.08 K/UL (ref 0–0.51)
EOSINOPHIL NFR BLD: 0.7 % (ref 0–6.9)
ERYTHROCYTE [DISTWIDTH] IN BLOOD BY AUTOMATED COUNT: 51.2 FL (ref 35.9–50)
GLOBULIN SER CALC-MCNC: 2.6 G/DL (ref 1.9–3.5)
GLUCOSE SERPL-MCNC: 102 MG/DL (ref 65–99)
HCT VFR BLD AUTO: 28.5 % (ref 37–47)
HGB BLD-MCNC: 8.3 G/DL (ref 12–16)
HGB BLD-MCNC: 8.4 G/DL (ref 12–16)
HGB BLD-MCNC: 9.3 G/DL (ref 12–16)
IMM GRANULOCYTES # BLD AUTO: 0.06 K/UL (ref 0–0.11)
IMM GRANULOCYTES NFR BLD AUTO: 0.5 % (ref 0–0.9)
LYMPHOCYTES # BLD AUTO: 1.55 K/UL (ref 1–4.8)
LYMPHOCYTES NFR BLD: 12.9 % (ref 22–41)
MCH RBC QN AUTO: 30.4 PG (ref 27–33)
MCHC RBC AUTO-ENTMCNC: 32.6 G/DL (ref 33.6–35)
MCV RBC AUTO: 93.1 FL (ref 81.4–97.8)
MONOCYTES # BLD AUTO: 1.15 K/UL (ref 0–0.85)
MONOCYTES NFR BLD AUTO: 9.6 % (ref 0–13.4)
NEUTROPHILS # BLD AUTO: 9.1 K/UL (ref 2–7.15)
NEUTROPHILS NFR BLD: 76 % (ref 44–72)
NRBC # BLD AUTO: 0 K/UL
NRBC BLD-RTO: 0 /100 WBC
PLATELET # BLD AUTO: 173 K/UL (ref 164–446)
PMV BLD AUTO: 9.4 FL (ref 9–12.9)
POTASSIUM SERPL-SCNC: 3.8 MMOL/L (ref 3.6–5.5)
PROT SERPL-MCNC: 5.8 G/DL (ref 6–8.2)
RBC # BLD AUTO: 3.06 M/UL (ref 4.2–5.4)
SODIUM SERPL-SCNC: 137 MMOL/L (ref 135–145)
WBC # BLD AUTO: 12 K/UL (ref 4.8–10.8)

## 2020-08-06 PROCEDURE — 700105 HCHG RX REV CODE 258: Performed by: NURSE PRACTITIONER

## 2020-08-06 PROCEDURE — 85025 COMPLETE CBC W/AUTO DIFF WBC: CPT

## 2020-08-06 PROCEDURE — A9270 NON-COVERED ITEM OR SERVICE: HCPCS | Performed by: HOSPITALIST

## 2020-08-06 PROCEDURE — 700102 HCHG RX REV CODE 250 W/ 637 OVERRIDE(OP): Performed by: HOSPITALIST

## 2020-08-06 PROCEDURE — 99232 SBSQ HOSP IP/OBS MODERATE 35: CPT | Performed by: FAMILY MEDICINE

## 2020-08-06 PROCEDURE — 36415 COLL VENOUS BLD VENIPUNCTURE: CPT

## 2020-08-06 PROCEDURE — 80053 COMPREHEN METABOLIC PANEL: CPT

## 2020-08-06 PROCEDURE — 770006 HCHG ROOM/CARE - MED/SURG/GYN SEMI*

## 2020-08-06 PROCEDURE — 85018 HEMOGLOBIN: CPT

## 2020-08-06 PROCEDURE — 700111 HCHG RX REV CODE 636 W/ 250 OVERRIDE (IP): Performed by: FAMILY MEDICINE

## 2020-08-06 PROCEDURE — C9113 INJ PANTOPRAZOLE SODIUM, VIA: HCPCS | Performed by: FAMILY MEDICINE

## 2020-08-06 RX ORDER — SODIUM CHLORIDE, SODIUM LACTATE, POTASSIUM CHLORIDE, CALCIUM CHLORIDE 600; 310; 30; 20 MG/100ML; MG/100ML; MG/100ML; MG/100ML
INJECTION, SOLUTION INTRAVENOUS CONTINUOUS
Status: DISCONTINUED | OUTPATIENT
Start: 2020-08-06 | End: 2020-08-08

## 2020-08-06 RX ADMIN — LEVOTHYROXINE SODIUM 112 MCG: 112 TABLET ORAL at 05:01

## 2020-08-06 RX ADMIN — DOCUSATE SODIUM 50 MG AND SENNOSIDES 8.6 MG 2 TABLET: 8.6; 5 TABLET, FILM COATED ORAL at 17:25

## 2020-08-06 RX ADMIN — METOPROLOL TARTRATE 25 MG: 25 TABLET, FILM COATED ORAL at 05:01

## 2020-08-06 RX ADMIN — PANTOPRAZOLE SODIUM 40 MG: 40 INJECTION, POWDER, LYOPHILIZED, FOR SOLUTION INTRAVENOUS at 05:02

## 2020-08-06 RX ADMIN — SODIUM CHLORIDE, POTASSIUM CHLORIDE, SODIUM LACTATE AND CALCIUM CHLORIDE: 600; 310; 30; 20 INJECTION, SOLUTION INTRAVENOUS at 23:04

## 2020-08-06 RX ADMIN — METOPROLOL TARTRATE 25 MG: 25 TABLET, FILM COATED ORAL at 17:25

## 2020-08-06 ASSESSMENT — COGNITIVE AND FUNCTIONAL STATUS - GENERAL
CLIMB 3 TO 5 STEPS WITH RAILING: A LITTLE
DRESSING REGULAR UPPER BODY CLOTHING: A LITTLE
WALKING IN HOSPITAL ROOM: A LITTLE
SUGGESTED CMS G CODE MODIFIER MOBILITY: CK
PERSONAL GROOMING: A LITTLE
TURNING FROM BACK TO SIDE WHILE IN FLAT BAD: A LITTLE
SUGGESTED CMS G CODE MODIFIER DAILY ACTIVITY: CK
MOVING FROM LYING ON BACK TO SITTING ON SIDE OF FLAT BED: A LITTLE
EATING MEALS: A LITTLE
DRESSING REGULAR LOWER BODY CLOTHING: A LITTLE
DAILY ACTIVITIY SCORE: 18
MOVING TO AND FROM BED TO CHAIR: A LITTLE
HELP NEEDED FOR BATHING: A LITTLE
MOBILITY SCORE: 18
TOILETING: A LITTLE
STANDING UP FROM CHAIR USING ARMS: A LITTLE

## 2020-08-06 ASSESSMENT — ENCOUNTER SYMPTOMS
BACK PAIN: 0
HEADACHES: 0
SPUTUM PRODUCTION: 0
HEMOPTYSIS: 0
CHILLS: 0
DIZZINESS: 0
VOMITING: 0
PALPITATIONS: 0
MYALGIAS: 0
DOUBLE VISION: 0
HEARTBURN: 0
BLURRED VISION: 0
ORTHOPNEA: 0
COUGH: 0
NAUSEA: 0
PHOTOPHOBIA: 0
NECK PAIN: 0
TINGLING: 0

## 2020-08-06 NOTE — PROGRESS NOTES
Bedside report received.  Assessment complete.  A&O x 4. Patient calls appropriately.  Patient ambulates with one assist. Bed alarm off.   Patient has 0/10 pain. Pain managed with prescribed medications.  Denies N&V. Tolerating clear liquid diet.  + void, + flatus, + BM. Pt is incontinent of urine and stool   Patient denies SOB.  SCD's on.  Review plan with of care with patient. Call light and personal belongings with in reach. Hourly rounding in place. All needs met at this time.

## 2020-08-06 NOTE — ANESTHESIA PREPROCEDURE EVALUATION
Relevant Problems   CARDIAC   (+) Atherosclerosis   (+) Benign essential HTN      ENDO   (+) Hypothyroid       Physical Exam    Airway   Mallampati: II  TM distance: >3 FB  Neck ROM: full       Cardiovascular - normal exam  Rhythm: regular  Rate: normal  (-) murmur     Dental - normal exam           Pulmonary - normal exam  Breath sounds clear to auscultation     Abdominal    Neurological - normal exam                 Anesthesia Plan    ASA 3   ASA physical status 3 criteria: MI or angina - history (> 3 months)    Plan - general       Airway plan will be ETT        Induction: intravenous    Postoperative Plan: Postoperative administration of opioids is intended.    Pertinent diagnostic labs and testing reviewed    Informed Consent:    Anesthetic plan and risks discussed with patient.    Use of blood products discussed with: patient whom consented to blood products.

## 2020-08-06 NOTE — PROGRESS NOTES
[facility] Medicine Daily Progress Note    Date of Service  8/6/2020    Chief Complaint  89 y.o. female admitted 8/1/2020 with gi bleed    Hospital Course     89 y.o. female with dementia, prior lower GI bleed from diverticuli, dyslipidemia, hypothyroidism who presented 8/1/2020 with bright red blood per rectum this morning.  Her daughter normally helps change her adult briefs for her and at 430 she went to help her and noticed clean briefs other than urine.  Then at 730 she went to change her briefs and the patient had foul-smelling bright red blood of clumps of stool in it.  There is no diarrhea.  The patient denies any heartburn she has not been taking any NSAIDs she had taken an occasional aspirin but not on a routine basis for headache.  She had had prior episodes in the past of diverticular bleed.  Patient was not having nausea or vomiting until she got to the hospital at which time she told her daughter she felt nauseous when she got into the ER she vomited up coffee that she drinks earlier.  The patient did have cream of wheat this morning.     Gastroenterologist Dr. Ryland Goins, and myself did evaluate patient together.  He felt that this is likely diverticular bleed we will monitor her.  He was okay with patient being on clear liquids.  If the patient has any acute bleeding again we will need to have a stat repeat CTA.         Interval Problem Update  Hemoglobin has been dropping  S/p prbc transfusion  colonscopy on 8/7/20    Consultants/Specialty  gi    Code Status  dnr    Disposition  pending    Review of Systems  Review of Systems   Constitutional: Negative for chills and malaise/fatigue.   HENT: Positive for hearing loss. Negative for ear discharge and ear pain.    Eyes: Negative for blurred vision, double vision and photophobia.   Respiratory: Negative for cough, hemoptysis and sputum production.    Cardiovascular: Negative for chest pain, palpitations and orthopnea.   Gastrointestinal: Negative for  heartburn, nausea and vomiting.   Genitourinary: Negative for dysuria, frequency and urgency.   Musculoskeletal: Negative for back pain, myalgias and neck pain.   Skin: Negative for itching.   Neurological: Negative for dizziness, tingling and headaches.        Physical Exam  Temp:  [36.2 °C (97.2 °F)-37.2 °C (99 °F)] 37.2 °C (99 °F)  Pulse:  [] 86  Resp:  [17-24] 18  BP: (100-145)/(47-78) 131/64  SpO2:  [91 %-100 %] 91 %    Physical Exam  Constitutional:       Appearance: She is not toxic-appearing or diaphoretic.   HENT:      Head: Normocephalic and atraumatic.      Nose: Nose normal.   Eyes:      Extraocular Movements: Extraocular movements intact.      Pupils: Pupils are equal, round, and reactive to light.   Neck:      Musculoskeletal: Normal range of motion and neck supple.   Cardiovascular:      Rate and Rhythm: Normal rate and regular rhythm.      Pulses: Normal pulses.      Heart sounds: Normal heart sounds.   Pulmonary:      Effort: Pulmonary effort is normal.      Breath sounds: Normal breath sounds.   Abdominal:      Palpations: Abdomen is soft.      Tenderness: There is no guarding or rebound.      Comments: obese   Skin:     General: Skin is warm and dry.   Neurological:      Mental Status: She is alert. Mental status is at baseline.         Fluids    Intake/Output Summary (Last 24 hours) at 8/6/2020 1055  Last data filed at 8/6/2020 0715  Gross per 24 hour   Intake 0 ml   Output 400 ml   Net -400 ml       Laboratory  Recent Labs     08/05/20  0359 08/05/20  1227 08/05/20 2031 08/06/20  0415   WBC 6.8  --   --  12.0*   RBC 2.77*  --   --  3.06*   HEMOGLOBIN 8.2* 6.9* 8.8* 9.3*   HEMATOCRIT 25.4*  --   --  28.5*   MCV 91.7  --   --  93.1   MCH 29.6  --   --  30.4   MCHC 32.3*  --   --  32.6*   RDW 50.9*  --   --  51.2*   PLATELETCT 144*  --   --  173   MPV 9.5  --   --  9.4     Recent Labs     08/05/20  0359 08/06/20  0415   SODIUM 137 137   POTASSIUM 3.8 3.8   CHLORIDE 101 98   CO2 28 28    GLUCOSE 88 102*   BUN 11 7*   CREATININE 0.26* 0.36*   CALCIUM 9.1 9.2                   Imaging       Assessment/Plan  * Diverticulosis- (present on admission)  Assessment & Plan  H/h dropped  S/p PRBC transfusion  Nuclear bleeding scan is negative  Gi input is noted  colonscopy on 8/7/20    Benign essential HTN- (present on admission)  Assessment & Plan  Metoprolol 25mg BID w/ parameters  Monitor vitals.    Acute blood loss anemia  Assessment & Plan  ACUTE   2nd to gi bleed  Normocytic  Transfuse if hemoglobin is less then 7  S/p prbc transfusion    Atherosclerosis  Assessment & Plan  CT showing atherosclerosis with narrowing of the SMA artery as well as renal arteries.  No current renal failure.  Continue medical management with blood pressure control    DNR (do not resuscitate)  Assessment & Plan  Daughter, Meme, states she her mother wants DNR/DNI    Hard of hearing  Assessment & Plan  Has hearing aids, hears better in left ear.  We will have to talk loudly and slowly on her left side.    Dementia (HCC)  Assessment & Plan  Daughter cares for her mother and states she is POA.    HLD (hyperlipidemia)- (present on admission)  Assessment & Plan  Healthy balanced diet.    Hypothyroid- (present on admission)  Assessment & Plan  Levothyroxine 112 mcg daily  Last TSH:1.61 in past year       VTE prophylaxis: scd

## 2020-08-06 NOTE — PROGRESS NOTES
Gastroenterology Progress Note     Author: Richy Ramsey M.D.   Date & Time Created: 8/6/2020 8:22 AM    Chief Complaint:  Lower GI bleeding  Марина Giron presents with lower GI bleeding.  In 5/2019 she had GI bleeding and Hgb declined from 10.2 to 8.1 on 5/15.  EGD found moderate gastritis.  Colonoscopy removed 4 adenomas and found diverticulosis.      In 9/2019 she had GI bleeding and Hgb declined from 11.9 to 7.4 on 10/2.  Nuclear medicine bleeding scan was negative.  The bleeding was attributed to diverticulosis.      On 8/1 she was hospitalized when she had 2 episodes of bright red blood mixed with small amounts of stool in her diaper.  Initial Hgb was 11.7 with MCV 94, and declined to 8.2 on 8/2.  CTA found no active bleeding.     APCT 9/2019: Prominent descending duodenal diverticulum.  Diverticulosis.     CTA 8/ 1/2020: No active bleeding.  Diverticulosis.  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    Nuclear medicine bleeding scan 8/3: No active bleeding.     COVID test: Negative on 8/2.    Interval History:    8/3: Her RN reported a small amount of maroon blood in the diaper yesterday.  Today she passed a moderate amount of maroon blood twice, at least 4 ounces each time.  No other symptoms.     8/4: Intermittently passing significant maroon blood.  She feels well and denies symptoms.  Hgb declined to 7.2 today.  The bleeding scan ordered for this morning was not performed, but the RN reordered it stat      8/4 at 1750: Repeat nuclear medicine bleeding scan was negative.  Hgb 7.1.  Will proceed with EGD/enteroscopy tomorrow.  An informed consent discussion was completed today.  8/5/2020: 1.  Nonobstructing Schatzki's ring. Large descending duodenal diverticulum with bile draining from the distal aspect.  The papilla of Vater was obscured by a fold.  The endoscopic view suggested blood in the bile, but the fluid was captured in a suction trap, and appeared to be dark bile without blood.  A  cause of GI bleeding was not found during EGD/enteroscopy   8/6/2020: prep suboptimal, only drank 1/2 of the prep. Output dark. H/H stable.    Review of Systems:  Review of Systems   Unable to perform ROS: Other       Physical Exam:  Physical Exam  Vitals signs reviewed.   Constitutional:       Appearance: Normal appearance.   HENT:      Head: Normocephalic and atraumatic.      Mouth/Throat:      Mouth: Mucous membranes are dry.   Eyes:      Extraocular Movements: Extraocular movements intact.      Pupils: Pupils are equal, round, and reactive to light.   Neck:      Musculoskeletal: Normal range of motion and neck supple.   Cardiovascular:      Rate and Rhythm: Normal rate and regular rhythm.   Pulmonary:      Effort: Pulmonary effort is normal.      Breath sounds: Normal breath sounds.   Abdominal:      General: Abdomen is flat. Bowel sounds are normal. There is no distension.      Palpations: Abdomen is soft. There is no mass.      Tenderness: There is no abdominal tenderness. There is no right CVA tenderness, left CVA tenderness, guarding or rebound.      Hernia: No hernia is present.      Comments: Chaperoned rectal exam with female MA showed dark liquid stool unable to see through   Neurological:      Mental Status: She is alert.         Labs:          Recent Labs     08/05/20  0359 08/06/20  0415   SODIUM 137 137   POTASSIUM 3.8 3.8   CHLORIDE 101 98   CO2 28 28   BUN 11 7*   CREATININE 0.26* 0.36*   CALCIUM 9.1 9.2     Recent Labs     08/05/20  0359 08/06/20  0415   ALTSGPT 9 9   ASTSGOT 12 14   ALKPHOSPHAT 62 74   TBILIRUBIN 2.3* 0.5   GLUCOSE 88 102*     Recent Labs     08/05/20  0359 08/05/20  1227 08/05/20  2031 08/06/20  0415   RBC 2.77*  --   --  3.06*   HEMOGLOBIN 8.2* 6.9* 8.8* 9.3*   HEMATOCRIT 25.4*  --   --  28.5*   PLATELETCT 144*  --   --  173     Recent Labs     08/05/20  0359 08/06/20  0415   WBC 6.8 12.0*   NEUTSPOLYS 59.20 76.00*   LYMPHOCYTES 25.40 12.90*   MONOCYTES 11.90 9.60   EOSINOPHILS  2.60 0.70   BASOPHILS 0.30 0.30   ASTSGOT 12 14   ALTSGPT 9 9   ALKPHOSPHAT 62 74   TBILIRUBIN 2.3* 0.5     Hemodynamics:  Temp (24hrs), Av.4 °C (97.5 °F), Min:36.2 °C (97.2 °F), Max:36.5 °C (97.7 °F)  Temperature: 36.5 °C (97.7 °F)  Pulse  Av.5  Min: 62  Max: 105   Blood Pressure : 145/78     Respiratory:    Respiration: 20, Pulse Oximetry: 91 %        RUL Breath Sounds: Clear, RML Breath Sounds: Diminished, RLL Breath Sounds: Diminished, ZAIN Breath Sounds: Clear, LLL Breath Sounds: Diminished  Fluids:    Intake/Output Summary (Last 24 hours) at 2020 0822  Last data filed at 2020 0000  Gross per 24 hour   Intake 300 ml   Output 400 ml   Net -100 ml        GI/Nutrition:  Orders Placed This Encounter   Procedures   • Diet NPO     No Red Foods. Sips of clear liquids to take medications up to 2 hours prior to procedure.     Standing Status:   Standing     Number of Occurrences:   8     Order Specific Question:   Restrict to:     Answer:   Sips with Medications [3]     Medical Decision Making, by Problem:  Active Hospital Problems    Diagnosis   • *Diverticulosis [K57.90]   • Benign essential HTN [I10]   • Dementia (HCC) [F03.90]   • Hard of hearing [H91.90]   • DNR (do not resuscitate) [Z66]   • Atherosclerosis [I70.90]   • HLD (hyperlipidemia) [E78.5]   • Hypothyroid [E03.9]   • Acute blood loss anemia [D62]     Impression:  1. Recurrent lower GI bleeding: DDX diverticular in nature vs other. Imaging evaluation to date negative. S/p push enteroscopy negative for source  2. Anemia- improved overnight      Plan:  1. Clear liquid diet ok today  2. Continue golytely prep until stool clear  3. Given suboptimal prep hindering visualization, will reschedule colonoscopy with anesthesia on 2020 in AM.   4. NPO post midnight  5. Chronic medical condition as per team.  6. I Have attempted to call daughter Sofia  At [phone] without success.    Quality-Core Measures

## 2020-08-07 ENCOUNTER — ANESTHESIA EVENT (OUTPATIENT)
Dept: SURGERY | Facility: MEDICAL CENTER | Age: 85
DRG: 327 | End: 2020-08-07
Payer: MEDICARE

## 2020-08-07 ENCOUNTER — ANESTHESIA (OUTPATIENT)
Dept: SURGERY | Facility: MEDICAL CENTER | Age: 85
DRG: 327 | End: 2020-08-07
Payer: MEDICARE

## 2020-08-07 LAB
ALBUMIN SERPL BCP-MCNC: 2.6 G/DL (ref 3.2–4.9)
ALBUMIN/GLOB SERPL: 1 G/DL
ALP SERPL-CCNC: 66 U/L (ref 30–99)
ALT SERPL-CCNC: 7 U/L (ref 2–50)
ANION GAP SERPL CALC-SCNC: 8 MMOL/L (ref 7–16)
AST SERPL-CCNC: 11 U/L (ref 12–45)
BASOPHILS # BLD AUTO: 0.3 % (ref 0–1.8)
BASOPHILS # BLD: 0.02 K/UL (ref 0–0.12)
BILIRUB SERPL-MCNC: 0.4 MG/DL (ref 0.1–1.5)
BUN SERPL-MCNC: 6 MG/DL (ref 8–22)
CALCIUM SERPL-MCNC: 9 MG/DL (ref 8.5–10.5)
CHLORIDE SERPL-SCNC: 99 MMOL/L (ref 96–112)
CO2 SERPL-SCNC: 31 MMOL/L (ref 20–33)
CREAT SERPL-MCNC: 0.29 MG/DL (ref 0.5–1.4)
EOSINOPHIL # BLD AUTO: 0.2 K/UL (ref 0–0.51)
EOSINOPHIL NFR BLD: 2.9 % (ref 0–6.9)
ERYTHROCYTE [DISTWIDTH] IN BLOOD BY AUTOMATED COUNT: 50.5 FL (ref 35.9–50)
GLOBULIN SER CALC-MCNC: 2.5 G/DL (ref 1.9–3.5)
GLUCOSE SERPL-MCNC: 89 MG/DL (ref 65–99)
HCT VFR BLD AUTO: 23.3 % (ref 37–47)
HGB BLD-MCNC: 7.5 G/DL (ref 12–16)
HGB BLD-MCNC: 7.6 G/DL (ref 12–16)
HGB BLD-MCNC: 8.2 G/DL (ref 12–16)
IMM GRANULOCYTES # BLD AUTO: 0.02 K/UL (ref 0–0.11)
IMM GRANULOCYTES NFR BLD AUTO: 0.3 % (ref 0–0.9)
LYMPHOCYTES # BLD AUTO: 1.96 K/UL (ref 1–4.8)
LYMPHOCYTES NFR BLD: 28 % (ref 22–41)
MCH RBC QN AUTO: 30.4 PG (ref 27–33)
MCHC RBC AUTO-ENTMCNC: 32.6 G/DL (ref 33.6–35)
MCV RBC AUTO: 93.2 FL (ref 81.4–97.8)
MONOCYTES # BLD AUTO: 0.87 K/UL (ref 0–0.85)
MONOCYTES NFR BLD AUTO: 12.4 % (ref 0–13.4)
NEUTROPHILS # BLD AUTO: 3.94 K/UL (ref 2–7.15)
NEUTROPHILS NFR BLD: 56.1 % (ref 44–72)
NRBC # BLD AUTO: 0 K/UL
NRBC BLD-RTO: 0 /100 WBC
PATHOLOGY CONSULT NOTE: NORMAL
PLATELET # BLD AUTO: 158 K/UL (ref 164–446)
PMV BLD AUTO: 9.3 FL (ref 9–12.9)
POTASSIUM SERPL-SCNC: 3.6 MMOL/L (ref 3.6–5.5)
PROT SERPL-MCNC: 5.1 G/DL (ref 6–8.2)
RBC # BLD AUTO: 2.5 M/UL (ref 4.2–5.4)
SODIUM SERPL-SCNC: 138 MMOL/L (ref 135–145)
WBC # BLD AUTO: 7 K/UL (ref 4.8–10.8)

## 2020-08-07 PROCEDURE — A9270 NON-COVERED ITEM OR SERVICE: HCPCS | Performed by: HOSPITALIST

## 2020-08-07 PROCEDURE — 160203 HCHG ENDO MINUTES - 1ST 30 MINS LEVEL 4: Performed by: INTERNAL MEDICINE

## 2020-08-07 PROCEDURE — 0DBL8ZZ EXCISION OF TRANSVERSE COLON, VIA NATURAL OR ARTIFICIAL OPENING ENDOSCOPIC: ICD-10-PCS | Performed by: INTERNAL MEDICINE

## 2020-08-07 PROCEDURE — 160048 HCHG OR STATISTICAL LEVEL 1-5: Performed by: INTERNAL MEDICINE

## 2020-08-07 PROCEDURE — 160035 HCHG PACU - 1ST 60 MINS PHASE I: Performed by: INTERNAL MEDICINE

## 2020-08-07 PROCEDURE — 700102 HCHG RX REV CODE 250 W/ 637 OVERRIDE(OP): Performed by: FAMILY MEDICINE

## 2020-08-07 PROCEDURE — 770006 HCHG ROOM/CARE - MED/SURG/GYN SEMI*

## 2020-08-07 PROCEDURE — 99232 SBSQ HOSP IP/OBS MODERATE 35: CPT | Performed by: FAMILY MEDICINE

## 2020-08-07 PROCEDURE — 700102 HCHG RX REV CODE 250 W/ 637 OVERRIDE(OP): Performed by: HOSPITALIST

## 2020-08-07 PROCEDURE — 85025 COMPLETE CBC W/AUTO DIFF WBC: CPT

## 2020-08-07 PROCEDURE — 88305 TISSUE EXAM BY PATHOLOGIST: CPT

## 2020-08-07 PROCEDURE — 700111 HCHG RX REV CODE 636 W/ 250 OVERRIDE (IP): Performed by: ANESTHESIOLOGY

## 2020-08-07 PROCEDURE — 160046 HCHG PACU - 1ST 60 MINS PHASE II: Performed by: INTERNAL MEDICINE

## 2020-08-07 PROCEDURE — 501629 HCHG TUBE, LUKI TRAP STERILE DISP: Performed by: INTERNAL MEDICINE

## 2020-08-07 PROCEDURE — 80053 COMPREHEN METABOLIC PANEL: CPT

## 2020-08-07 PROCEDURE — 160208 HCHG ENDO MINUTES - EA ADDL 1 MIN LEVEL 4: Performed by: INTERNAL MEDICINE

## 2020-08-07 PROCEDURE — 160025 RECOVERY II MINUTES (STATS): Performed by: INTERNAL MEDICINE

## 2020-08-07 PROCEDURE — C9113 INJ PANTOPRAZOLE SODIUM, VIA: HCPCS | Performed by: FAMILY MEDICINE

## 2020-08-07 PROCEDURE — 700111 HCHG RX REV CODE 636 W/ 250 OVERRIDE (IP): Performed by: FAMILY MEDICINE

## 2020-08-07 PROCEDURE — 700105 HCHG RX REV CODE 258: Performed by: NURSE PRACTITIONER

## 2020-08-07 PROCEDURE — 36415 COLL VENOUS BLD VENIPUNCTURE: CPT

## 2020-08-07 PROCEDURE — 85018 HEMOGLOBIN: CPT

## 2020-08-07 PROCEDURE — A9270 NON-COVERED ITEM OR SERVICE: HCPCS | Performed by: FAMILY MEDICINE

## 2020-08-07 PROCEDURE — 160002 HCHG RECOVERY MINUTES (STAT): Performed by: INTERNAL MEDICINE

## 2020-08-07 PROCEDURE — 160009 HCHG ANES TIME/MIN: Performed by: INTERNAL MEDICINE

## 2020-08-07 RX ORDER — HALOPERIDOL 5 MG/ML
1 INJECTION INTRAMUSCULAR
Status: DISCONTINUED | OUTPATIENT
Start: 2020-08-07 | End: 2020-08-07 | Stop reason: HOSPADM

## 2020-08-07 RX ORDER — DIPHENHYDRAMINE HYDROCHLORIDE 50 MG/ML
12.5 INJECTION INTRAMUSCULAR; INTRAVENOUS
Status: DISCONTINUED | OUTPATIENT
Start: 2020-08-07 | End: 2020-08-07 | Stop reason: HOSPADM

## 2020-08-07 RX ORDER — ONDANSETRON 2 MG/ML
4 INJECTION INTRAMUSCULAR; INTRAVENOUS
Status: DISCONTINUED | OUTPATIENT
Start: 2020-08-07 | End: 2020-08-07 | Stop reason: HOSPADM

## 2020-08-07 RX ADMIN — PROPOFOL 240 MG: 10 INJECTION, EMULSION INTRAVENOUS at 11:50

## 2020-08-07 RX ADMIN — METOPROLOL TARTRATE 12.5 MG: 25 TABLET, FILM COATED ORAL at 17:04

## 2020-08-07 RX ADMIN — SODIUM CHLORIDE, POTASSIUM CHLORIDE, SODIUM LACTATE AND CALCIUM CHLORIDE: 600; 310; 30; 20 INJECTION, SOLUTION INTRAVENOUS at 21:00

## 2020-08-07 RX ADMIN — ACETAMINOPHEN 650 MG: 325 TABLET, FILM COATED ORAL at 21:00

## 2020-08-07 RX ADMIN — PANTOPRAZOLE SODIUM 40 MG: 40 INJECTION, POWDER, LYOPHILIZED, FOR SOLUTION INTRAVENOUS at 05:08

## 2020-08-07 RX ADMIN — METOPROLOL TARTRATE 25 MG: 25 TABLET, FILM COATED ORAL at 05:08

## 2020-08-07 RX ADMIN — DOCUSATE SODIUM 50 MG AND SENNOSIDES 8.6 MG 2 TABLET: 8.6; 5 TABLET, FILM COATED ORAL at 17:04

## 2020-08-07 RX ADMIN — DOCUSATE SODIUM 50 MG AND SENNOSIDES 8.6 MG 2 TABLET: 8.6; 5 TABLET, FILM COATED ORAL at 05:08

## 2020-08-07 RX ADMIN — LEVOTHYROXINE SODIUM 112 MCG: 112 TABLET ORAL at 05:08

## 2020-08-07 ASSESSMENT — ENCOUNTER SYMPTOMS
PHOTOPHOBIA: 0
FEVER: 0
CHILLS: 0
SPUTUM PRODUCTION: 0
SHORTNESS OF BREATH: 0
VOMITING: 0
NAUSEA: 0
ABDOMINAL PAIN: 0
EYE PAIN: 0
HEADACHES: 0
ORTHOPNEA: 0
NECK PAIN: 0
TREMORS: 0
BACK PAIN: 0
PALPITATIONS: 0
HEMOPTYSIS: 0
DOUBLE VISION: 0
TINGLING: 0
CLAUDICATION: 0

## 2020-08-07 NOTE — CARE PLAN
Problem: Safety  Goal: Will remain free from falls  Outcome: PROGRESSING AS EXPECTED     Patient educated to call for assistance before getting out of bed, verbalizes understanding     Problem: Knowledge Deficit  Goal: Knowledge of the prescribed therapeutic regimen will improve  Outcome: PROGRESSING AS EXPECTED     Questions regarding therapeutic regimen answered

## 2020-08-07 NOTE — ANESTHESIA PREPROCEDURE EVALUATION
Relevant Problems   CARDIAC   (+) Atherosclerosis   (+) Benign essential HTN      ENDO   (+) Hypothyroid       Physical Exam    Airway   Mallampati: II  TM distance: >3 FB  Neck ROM: full       Cardiovascular - normal exam  Rhythm: regular  Rate: normal  (-) murmur     Dental - normal exam           Pulmonary - normal exam  Breath sounds clear to auscultation     Abdominal    Neurological - normal exam                 Anesthesia Plan    ASA 3       Plan - MAC             Induction: intravenous    Postoperative Plan: Postoperative administration of opioids is intended.    Pertinent diagnostic labs and testing reviewed    Informed Consent:    Anesthetic plan and risks discussed with patient.    Use of blood products discussed with: patient whom consented to blood products.

## 2020-08-07 NOTE — PROGRESS NOTES
Received report from day shift RN. Assumed patient care   AOx4, hard of hearing  No complaints of pain at this time  1 L of O2 via nasal cannula  Clears no reds diet. NPO at midnight, no complaints of nausea at this time  Pt is incontinent, +void +BM. Purewick in place  q2 turns in place, waffle overlay in place  Call light within reach  Bed locked and in low position   POC discussed with patient  All needs met at this time.

## 2020-08-07 NOTE — DISCHARGE PLANNING
Care Transition Team Discharge Planning       Discharge Plan:  TBD    Per Chart, Pt is scheduled for Colonoscopy today.     Will follow and assist with discharge as needed.

## 2020-08-07 NOTE — PROGRESS NOTES
Hospital Medicine Daily Progress Note    Date of Service  8/7/2020    Chief Complaint  89 y.o. female admitted 8/1/2020 with gi bleed    Hospital Course     89 y.o. female with dementia, prior lower GI bleed from diverticuli, dyslipidemia, hypothyroidism who presented 8/1/2020 with bright red blood per rectum this morning.  Her daughter normally helps change her adult briefs for her and at 430 she went to help her and noticed clean briefs other than urine.  Then at 730 she went to change her briefs and the patient had foul-smelling bright red blood of clumps of stool in it.  There is no diarrhea.  The patient denies any heartburn she has not been taking any NSAIDs she had taken an occasional aspirin but not on a routine basis for headache.  She had had prior episodes in the past of diverticular bleed.  Patient was not having nausea or vomiting until she got to the hospital at which time she told her daughter she felt nauseous when she got into the ER she vomited up coffee that she drinks earlier.  The patient did have cream of wheat this morning.     Gastroenterologist Dr. Ryland Goins, and myself did evaluate patient together.  He felt that this is likely diverticular bleed we will monitor her.  He was okay with patient being on clear liquids.  If the patient has any acute bleeding again we will need to have a stat repeat CTA.         Interval Problem Update  Hemoglobin has been dropping  S/p prbc transfusion  S/p colonscopy on 8/7/20    Consultants/Specialty  gi    Code Status  dnr    Disposition  pending    Review of Systems  Review of Systems   Constitutional: Negative for chills, fever and malaise/fatigue.   HENT: Positive for hearing loss. Negative for ear discharge and nosebleeds.    Eyes: Negative for double vision, photophobia and pain.   Respiratory: Negative for hemoptysis, sputum production and shortness of breath.    Cardiovascular: Negative for palpitations, orthopnea and claudication.    Gastrointestinal: Negative for abdominal pain, nausea and vomiting.   Genitourinary: Negative for frequency, hematuria and urgency.   Musculoskeletal: Negative for back pain, joint pain and neck pain.   Skin: Negative for itching.   Neurological: Negative for tingling, tremors and headaches.        Physical Exam  Temp:  [36.2 °C (97.2 °F)-36.9 °C (98.5 °F)] 36.6 °C (97.8 °F)  Pulse:  [65-97] 65  Resp:  [14-20] 20  BP: ()/(30-71) 98/30  SpO2:  [98 %-100 %] 99 %    Physical Exam  Constitutional:       General: She is not in acute distress.     Appearance: She is not toxic-appearing.   HENT:      Head: Normocephalic and atraumatic.      Nose: No congestion or rhinorrhea.   Eyes:      Conjunctiva/sclera: Conjunctivae normal.      Pupils: Pupils are equal, round, and reactive to light.   Neck:      Musculoskeletal: No neck rigidity or muscular tenderness.   Cardiovascular:      Rate and Rhythm: Normal rate and regular rhythm.      Heart sounds: No murmur. No friction rub.   Pulmonary:      Effort: No respiratory distress.      Breath sounds: No stridor.   Abdominal:      General: There is no distension.      Palpations: Abdomen is soft.      Tenderness: There is no guarding or rebound.      Comments: obese   Skin:     General: Skin is warm and dry.   Neurological:      Mental Status: She is alert. Mental status is at baseline.         Fluids    Intake/Output Summary (Last 24 hours) at 8/7/2020 1232  Last data filed at 8/7/2020 1152  Gross per 24 hour   Intake 1426.67 ml   Output 150 ml   Net 1276.67 ml       Laboratory  Recent Labs     08/05/20  0359  08/06/20  0415 08/06/20  1228 08/06/20  2107 08/07/20  0432   WBC 6.8  --  12.0*  --   --  7.0   RBC 2.77*  --  3.06*  --   --  2.50*   HEMOGLOBIN 8.2*   < > 9.3* 8.4* 8.3* 7.6*   HEMATOCRIT 25.4*  --  28.5*  --   --  23.3*   MCV 91.7  --  93.1  --   --  93.2   MCH 29.6  --  30.4  --   --  30.4   MCHC 32.3*  --  32.6*  --   --  32.6*   RDW 50.9*  --  51.2*  --   --   50.5*   PLATELETCT 144*  --  173  --   --  158*   MPV 9.5  --  9.4  --   --  9.3    < > = values in this interval not displayed.     Recent Labs     08/05/20  0359 08/06/20  0415 08/07/20  0432   SODIUM 137 137 138   POTASSIUM 3.8 3.8 3.6   CHLORIDE 101 98 99   CO2 28 28 31   GLUCOSE 88 102* 89   BUN 11 7* 6*   CREATININE 0.26* 0.36* 0.29*   CALCIUM 9.1 9.2 9.0                   Imaging       Assessment/Plan  * Diverticulosis- (present on admission)  Assessment & Plan  H/h dropped  S/p PRBC transfusion  Nuclear bleeding scan is negative  Gi input is noted  S/p colonscopy   And recommended CTA if she has further bleeding    Benign essential HTN- (present on admission)  Assessment & Plan  BP is on the low side  Decreased lopressor.    Acute blood loss anemia  Assessment & Plan  ACUTE   2nd to gi bleed  Normocytic  Transfuse if hemoglobin is less then 7  S/p prbc transfusion  Will monitor H/H    Atherosclerosis  Assessment & Plan  CT showing atherosclerosis with narrowing of the SMA artery as well as renal arteries.  No current renal failure.  Continue medical management with blood pressure control    DNR (do not resuscitate)  Assessment & Plan  Daughter, Meme, states she her mother wants DNR/DNI    Hard of hearing  Assessment & Plan  Has hearing aids, hears better in left ear.  We will have to talk loudly and slowly on her left side.    Dementia (HCC)  Assessment & Plan  Daughter cares for her mother and states she is POA.    HLD (hyperlipidemia)- (present on admission)  Assessment & Plan  Healthy balanced diet.    Hypothyroid- (present on admission)  Assessment & Plan  Levothyroxine 112 mcg daily  Last TSH:1.61 in past year       VTE prophylaxis: scd

## 2020-08-07 NOTE — ANESTHESIA POSTPROCEDURE EVALUATION
Patient: Марина Giron    Procedure Summary     Date: 08/07/20 Room / Location: [facility] ROOM 26 / SURGERY SAME DAY [facility]    Anesthesia Start: 1117 Anesthesia Stop: 1152    Procedure: COLONOSCOPY (N/A Anus) Diagnosis: (Diverticulosis, 6 transverse colon polyps)    Surgeon: Richy Ramsey M.D. Responsible Provider: Antonio Neri M.D.    Anesthesia Type: MAC ASA Status: 3          Final Anesthesia Type: MAC  Last vitals  BP   Blood Pressure : 152/71    Temp   36.7 °C (98.1 °F)    Pulse   Pulse: 75   Resp   16    SpO2   99 %      Anesthesia Post Evaluation    Patient location during evaluation: PACU  Patient participation: complete - patient participated  Level of consciousness: awake and alert    Airway patency: patent  Anesthetic complications: no  Cardiovascular status: hemodynamically stable  Respiratory status: acceptable  Hydration status: euvolemic    PONV: none           Nurse Pain Score: 0 (NPRS)

## 2020-08-07 NOTE — PROCEDURES
Patient Name: Марина Giron    Colonoscopy Procedure Note  Date of Procedure: 8/7/2020  Attending Physician: Richy Ramsey M.D.        Indications: Hematochezia  Instrument: Olympus Flexible Variable Stiffness Colonoscope  Sedation:   Anesthesiologist/Type of Anesthesia:  Anesthesiologist: Antonio Neri M.D./General    Surgical Staff:  Circulator: Domenico Harvey R.N.  Endoscopy Technician: Fadumo Noyola C.N.A.  Endoscopy Nurse: Gloria Grady R.N.    Specimens removed if any:  ID Type Source Tests Collected by Time Destination   A : x6 polyps transverse  Polyp Colon - Transverse PATHOLOGY SPECIMEN Richy Ramsey M.D. 8/7/2020 11:34 AM          Procedure Details:    Colonoscopy  Pre-Anesthesia Assessment:  Prior to the procedure, a History and Physical was performed, and patient medications and allergies were reviewed. The patient’s tolerance of previous anesthesia was also reviewed. The risks and benefits of the procedure and the sedation options and risks were discussed with the patient including but not limited to infection, bleeding, aspiration, perforation, adverse medication reaction, missed diagnosis, and missed lesions. The patient verbalized understanding. All questions were answered, and informed consent was obtained.     After I obtained informed consent from the patient, the patient was placed in the left lateral position. Appropriate time-out protocol was followed: the correct patient, the correct procedure, and the correct equipment in the room were confirmed. Throughout the procedure, the patient’s blood pressure, pulse, and oxygen saturations were monitored continuously. Digital rectal exam was performed. The Olympus variable stiffness colonoscope was introduced through the anus and passed under direct vision. The scope was advanced to the cecum, identified by the appendiceal orifice and ileocecal valve. The colonscopy was performed without difficulty. The patient tolerated the procedure well.  The quality of the bowel preparation was good; stool effluent was brown/yellowish.  Findings and interventions were performed and documented below. The endoscope was then removed and the procedure was terminated. There were no immediate postoperative complications.        Findings:    TI: unable to enter due to looping and tortuosity  Ascending colon: normal  Transverse colon: 6 polyps (randing from 6mm - 1cm, largest x 1 removed with cautery snare, rest cold snare), removed  Descending colon: moderate diverticulosis, no bleeding  Sigmoid colon: severe diverticulosis, no bleeding  Rectum: normal. Retroflexion showed anal papillae, grade 1 internal hemorrhoids    Impressions:   1. Suspect possible diverticular bleeding as cause of hematochezia  2. 6 polyps removed with hot snare/cold snare techniques  3. Moderate/severe diverticulosis    Recommendations:   1. Monitor for postprocedure complication including bleeding perforation  2. Increase fiber  3. If recurrence of bleeding, obtain stat CTA vs Tagged RBC with embolization  4. Await pathology  5. Continue monitor H/H  6. Start liquid diet today and advance tomorrow.      This note was generated using voice recognition software which has a small chance of producing errors of grammar and possibly content. I have made every reasonable attempt to find and correct any obvious errors, but expect that some may not be found prior to finalization of this note

## 2020-08-07 NOTE — PROGRESS NOTES
2 RN skin check complete.   Devices in place: IV, O2 tubing, purewick.  Skin assessed under devices: yes.  Confirmed pressure ulcers found on: n/a.  New potential pressure ulcers noted on n/a Wound consult placed: n/a.  The following interventions in place: q2 turns, waffle overlay on, barrier cream  Pt buttocks is pink/blanching

## 2020-08-07 NOTE — ANESTHESIA TIME REPORT
Anesthesia Start and Stop Event Times     Date Time Event    8/7/2020 1101 Ready for Procedure     1117 Anesthesia Start     1152 Anesthesia Stop        Responsible Staff  08/07/20    Name Role Begin End    Antonio Neri M.D. Anesth 1117 1152        Preop Diagnosis (Free Text):  Pre-op Diagnosis     Lower GI bleeding        Preop Diagnosis (Codes):    Post op Diagnosis  GI bleed      Premium Reason  Non-Premium    Comments:

## 2020-08-07 NOTE — PROGRESS NOTES
Indication: Anemia, hematochezia  Risks, benefits, and alternatives were discussed with consenting person(s). Consenting person(s) were given an opportunity to ask questions and discuss other options. Risks including but not limited to failed or incomplete EGD, ineffective therapy, perforation, infection, bleeding, missed lesion(s), missed diagnosis, cardiac and/or pulmonary event, aspiration, stroke, possible need for surgery, hospitalization possibly prolonged, discomfort, unsuccessful and/or incomplete procedure, possible need for repeat procedures and/or additional testings, damage to adjacent organs and/or vascular structures, medication reaction, disability, death, and other adverse events possibly life-threatening. Discussion was undertaken with Layman's terms. Consenting persons stated understanding and acceptance of these risks, and wished to proceed. I have also called patient's daughter Sofia via telephone and she confirms that patient does have understanding and makes her own decision; Sofia occasional helps her sign due to her hand condition but Марина makes her own decision. Sofia denies any questions and is also agreeable to proceeding. Consent was given in clear state of mind.

## 2020-08-07 NOTE — OR NURSING
Pt arrives from floor.  Elem but alert and oriented when evaluated.  Must speak loudly into ears for pt to hear.     Denies pain.  Skin pale, warm to touch and dry.  Lungs diminished all fields with clear lung sounds.      IV fluids infusing without s/s of infiltration.    Plan of care discussed with pt.  Questions answered and pt signed consents after verbalizing understanding of procedure.

## 2020-08-07 NOTE — CARE PLAN
Problem: Safety  Goal: Will remain free from injury  Outcome: PROGRESSING AS EXPECTED   Bed alarm on. Patient educated on fall precaution.  Problem: Urinary Elimination:  Goal: Ability to reestablish a normal urinary elimination pattern will improve  Outcome: PROGRESSING AS EXPECTED   Purewick in place.

## 2020-08-07 NOTE — OR NURSING
1148 - Pt received to PACU 14 from OR.  VSS, 4L mask, titrated to 2L NC. Bedside report from anesthesiologist & RN. 6 polyps reported, no active bleeding, diverticulosis present.     1205 - MD Ramsey made call to pt family and let them know of findings.  VSS, 2L NC, denies pain or nausea.     1207 - Report to CARRINGTON Mobley.     1220 - Pt stable to discharge back to room.  Transport took patient back to room with 2L NC, chart on bed.

## 2020-08-08 LAB
ABO GROUP BLD: NORMAL
ALBUMIN SERPL BCP-MCNC: 2.3 G/DL (ref 3.2–4.9)
ALBUMIN/GLOB SERPL: 1 G/DL
ALP SERPL-CCNC: 59 U/L (ref 30–99)
ALT SERPL-CCNC: 11 U/L (ref 2–50)
ANION GAP SERPL CALC-SCNC: 6 MMOL/L (ref 7–16)
AST SERPL-CCNC: 11 U/L (ref 12–45)
BARCODED ABORH UBTYP: 7300
BARCODED PRD CODE UBPRD: NORMAL
BARCODED UNIT NUM UBUNT: NORMAL
BASOPHILS # BLD AUTO: 0.1 % (ref 0–1.8)
BASOPHILS # BLD: 0.01 K/UL (ref 0–0.12)
BILIRUB SERPL-MCNC: 0.3 MG/DL (ref 0.1–1.5)
BLD GP AB SCN SERPL QL: NORMAL
BUN SERPL-MCNC: 7 MG/DL (ref 8–22)
CALCIUM SERPL-MCNC: 9.1 MG/DL (ref 8.5–10.5)
CHLORIDE SERPL-SCNC: 100 MMOL/L (ref 96–112)
CO2 SERPL-SCNC: 32 MMOL/L (ref 20–33)
COMPONENT R 8504R: NORMAL
CREAT SERPL-MCNC: 0.34 MG/DL (ref 0.5–1.4)
EOSINOPHIL # BLD AUTO: 0.27 K/UL (ref 0–0.51)
EOSINOPHIL NFR BLD: 4 % (ref 0–6.9)
ERYTHROCYTE [DISTWIDTH] IN BLOOD BY AUTOMATED COUNT: 50.8 FL (ref 35.9–50)
GLOBULIN SER CALC-MCNC: 2.4 G/DL (ref 1.9–3.5)
GLUCOSE SERPL-MCNC: 92 MG/DL (ref 65–99)
HCT VFR BLD AUTO: 22.7 % (ref 37–47)
HGB BLD-MCNC: 7.2 G/DL (ref 12–16)
HGB RETIC QN AUTO: 30.5 PG/CELL (ref 29–35)
IMM GRANULOCYTES # BLD AUTO: 0.02 K/UL (ref 0–0.11)
IMM GRANULOCYTES NFR BLD AUTO: 0.3 % (ref 0–0.9)
IMM RETICS NFR: 19.2 % (ref 9.3–17.4)
IRON SATN MFR SERPL: 50 % (ref 15–55)
IRON SERPL-MCNC: 67 UG/DL (ref 40–170)
LYMPHOCYTES # BLD AUTO: 1.81 K/UL (ref 1–4.8)
LYMPHOCYTES NFR BLD: 26.7 % (ref 22–41)
MCH RBC QN AUTO: 30.3 PG (ref 27–33)
MCHC RBC AUTO-ENTMCNC: 31.7 G/DL (ref 33.6–35)
MCV RBC AUTO: 95.4 FL (ref 81.4–97.8)
MONOCYTES # BLD AUTO: 0.9 K/UL (ref 0–0.85)
MONOCYTES NFR BLD AUTO: 13.3 % (ref 0–13.4)
NEUTROPHILS # BLD AUTO: 3.77 K/UL (ref 2–7.15)
NEUTROPHILS NFR BLD: 55.6 % (ref 44–72)
NRBC # BLD AUTO: 0 K/UL
NRBC BLD-RTO: 0 /100 WBC
PLATELET # BLD AUTO: 156 K/UL (ref 164–446)
PMV BLD AUTO: 9.6 FL (ref 9–12.9)
POTASSIUM SERPL-SCNC: 4.1 MMOL/L (ref 3.6–5.5)
PRODUCT TYPE UPROD: NORMAL
PROT SERPL-MCNC: 4.7 G/DL (ref 6–8.2)
RBC # BLD AUTO: 2.38 M/UL (ref 4.2–5.4)
RETICS # AUTO: 0.09 M/UL (ref 0.04–0.06)
RETICS/RBC NFR: 3 % (ref 0.8–2.1)
RH BLD: NORMAL
SODIUM SERPL-SCNC: 138 MMOL/L (ref 135–145)
TIBC SERPL-MCNC: 133 UG/DL (ref 250–450)
UIBC SERPL-MCNC: 66 UG/DL (ref 110–370)
UNIT STATUS USTAT: NORMAL
WBC # BLD AUTO: 6.8 K/UL (ref 4.8–10.8)

## 2020-08-08 PROCEDURE — 85025 COMPLETE CBC W/AUTO DIFF WBC: CPT

## 2020-08-08 PROCEDURE — 700102 HCHG RX REV CODE 250 W/ 637 OVERRIDE(OP): Performed by: FAMILY MEDICINE

## 2020-08-08 PROCEDURE — 700102 HCHG RX REV CODE 250 W/ 637 OVERRIDE(OP): Performed by: HOSPITALIST

## 2020-08-08 PROCEDURE — 86923 COMPATIBILITY TEST ELECTRIC: CPT

## 2020-08-08 PROCEDURE — 770006 HCHG ROOM/CARE - MED/SURG/GYN SEMI*

## 2020-08-08 PROCEDURE — P9016 RBC LEUKOCYTES REDUCED: HCPCS

## 2020-08-08 PROCEDURE — 86850 RBC ANTIBODY SCREEN: CPT

## 2020-08-08 PROCEDURE — A9270 NON-COVERED ITEM OR SERVICE: HCPCS | Performed by: FAMILY MEDICINE

## 2020-08-08 PROCEDURE — 700111 HCHG RX REV CODE 636 W/ 250 OVERRIDE (IP): Performed by: FAMILY MEDICINE

## 2020-08-08 PROCEDURE — 36430 TRANSFUSION BLD/BLD COMPNT: CPT

## 2020-08-08 PROCEDURE — 83540 ASSAY OF IRON: CPT

## 2020-08-08 PROCEDURE — 86900 BLOOD TYPING SEROLOGIC ABO: CPT

## 2020-08-08 PROCEDURE — C9113 INJ PANTOPRAZOLE SODIUM, VIA: HCPCS | Performed by: FAMILY MEDICINE

## 2020-08-08 PROCEDURE — 85046 RETICYTE/HGB CONCENTRATE: CPT

## 2020-08-08 PROCEDURE — 80053 COMPREHEN METABOLIC PANEL: CPT

## 2020-08-08 PROCEDURE — 99232 SBSQ HOSP IP/OBS MODERATE 35: CPT | Performed by: FAMILY MEDICINE

## 2020-08-08 PROCEDURE — 83550 IRON BINDING TEST: CPT

## 2020-08-08 PROCEDURE — 36415 COLL VENOUS BLD VENIPUNCTURE: CPT

## 2020-08-08 PROCEDURE — A9270 NON-COVERED ITEM OR SERVICE: HCPCS | Performed by: HOSPITALIST

## 2020-08-08 PROCEDURE — 86901 BLOOD TYPING SEROLOGIC RH(D): CPT

## 2020-08-08 RX ORDER — SODIUM CHLORIDE 9 MG/ML
INJECTION, SOLUTION INTRAVENOUS
Status: COMPLETED
Start: 2020-08-08 | End: 2020-08-08

## 2020-08-08 RX ADMIN — METOPROLOL TARTRATE 12.5 MG: 25 TABLET, FILM COATED ORAL at 17:24

## 2020-08-08 RX ADMIN — DOCUSATE SODIUM 50 MG AND SENNOSIDES 8.6 MG 2 TABLET: 8.6; 5 TABLET, FILM COATED ORAL at 06:26

## 2020-08-08 RX ADMIN — METOPROLOL TARTRATE 12.5 MG: 25 TABLET, FILM COATED ORAL at 06:26

## 2020-08-08 RX ADMIN — LEVOTHYROXINE SODIUM 112 MCG: 112 TABLET ORAL at 06:26

## 2020-08-08 RX ADMIN — PANTOPRAZOLE SODIUM 40 MG: 40 INJECTION, POWDER, LYOPHILIZED, FOR SOLUTION INTRAVENOUS at 06:35

## 2020-08-08 ASSESSMENT — ENCOUNTER SYMPTOMS
COUGH: 0
DIAPHORESIS: 0
PALPITATIONS: 0
PHOTOPHOBIA: 0
VOMITING: 0
BACK PAIN: 0
SPUTUM PRODUCTION: 0
DOUBLE VISION: 0
HEMOPTYSIS: 0
NECK PAIN: 0
NAUSEA: 0
TINGLING: 0
DIZZINESS: 0
TREMORS: 0
ORTHOPNEA: 0
MYALGIAS: 0
HEARTBURN: 0
CHILLS: 0
HEADACHES: 0
BLURRED VISION: 0

## 2020-08-08 NOTE — PROGRESS NOTES
Gastroenterology Progress Note     Author: Richy Ramsey M.D.   Date & Time Created: 8/8/2020 10:02 AM    Chief Complaint:  Lower GI bleeding  Марина Giron presents with lower GI bleeding.  In 5/2019 she had GI bleeding and Hgb declined from 10.2 to 8.1 on 5/15.  EGD found moderate gastritis.  Colonoscopy removed 4 adenomas and found diverticulosis.      In 9/2019 she had GI bleeding and Hgb declined from 11.9 to 7.4 on 10/2.  Nuclear medicine bleeding scan was negative.  The bleeding was attributed to diverticulosis.      On 8/1 she was hospitalized when she had 2 episodes of bright red blood mixed with small amounts of stool in her diaper.  Initial Hgb was 11.7 with MCV 94, and declined to 8.2 on 8/2.  CTA found no active bleeding.     APCT 9/2019: Prominent descending duodenal diverticulum.  Diverticulosis.     CTA 8/ 1/2020: No active bleeding.  Diverticulosis.  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    Nuclear medicine bleeding scan 8/3: No active bleeding.     COVID test: Negative on 8/2.    Interval History:    8/3: Her RN reported a small amount of maroon blood in the diaper yesterday.  Today she passed a moderate amount of maroon blood twice, at least 4 ounces each time.  No other symptoms.     8/4: Intermittently passing significant maroon blood.  She feels well and denies symptoms.  Hgb declined to 7.2 today.  The bleeding scan ordered for this morning was not performed, but the RN reordered it stat      8/4 at 1750: Repeat nuclear medicine bleeding scan was negative.  Hgb 7.1.  Will proceed with EGD/enteroscopy tomorrow.  An informed consent discussion was completed today.  8/5/2020: 1.  Nonobstructing Schatzki's ring. Large descending duodenal diverticulum with bile draining from the distal aspect.  The papilla of Vater was obscured by a fold.  The endoscopic view suggested blood in the bile, but the fluid was captured in a suction trap, and appeared to be dark bile without blood.  A  cause of GI bleeding was not found during EGD/enteroscopy   8/6/2020: prep suboptimal, only drank 1/2 of the prep. Output dark. H/H stable.  8/7/2020: Suspect possible diverticular bleeding as cause of hematochezia. 6 polyps removed with hot snare/cold snare techniques. Moderate/severe diverticulosis  8/8/2020: H/H overall stable. No significant post procedure bleeding.        Review of Systems:  Review of Systems   Unable to perform ROS: Other   Extremely hard of hearing but denies complaints.    Physical Exam:  Physical Exam  Vitals signs reviewed.   Constitutional:       Appearance: Normal appearance.   HENT:      Head: Normocephalic and atraumatic.      Mouth/Throat:      Mouth: Mucous membranes are dry.   Eyes:      Extraocular Movements: Extraocular movements intact.      Pupils: Pupils are equal, round, and reactive to light.   Neck:      Musculoskeletal: Normal range of motion and neck supple.   Cardiovascular:      Rate and Rhythm: Normal rate and regular rhythm.   Pulmonary:      Effort: Pulmonary effort is normal.      Breath sounds: Normal breath sounds.   Abdominal:      General: Abdomen is flat. Bowel sounds are normal. There is no distension.      Palpations: Abdomen is soft. There is no mass.      Tenderness: There is no abdominal tenderness. There is no right CVA tenderness, left CVA tenderness, guarding or rebound.      Hernia: No hernia is present.      Comments: Chaperoned rectal exam with female MA showed dark liquid stool unable to see through   Neurological:      Mental Status: She is alert.         Labs:          Recent Labs     08/06/20  0415 08/07/20  0432 08/08/20  0430   SODIUM 137 138 138   POTASSIUM 3.8 3.6 4.1   CHLORIDE 98 99 100   CO2 28 31 32   BUN 7* 6* 7*   CREATININE 0.36* 0.29* 0.34*   CALCIUM 9.2 9.0 9.1     Recent Labs     08/06/20  0415 08/07/20  0432 08/08/20  0430   ALTSGPT 9 7 11   ASTSGOT 14 11* 11*   ALKPHOSPHAT 74 66 59   TBILIRUBIN 0.5 0.4 0.3   GLUCOSE 102* 89 92      Recent Labs     20  1319 20  0430   RBC 3.06*  --  2.50*  --   --  2.38*   HEMOGLOBIN 9.3*   < > 7.6* 8.2* 7.5* 7.2*   HEMATOCRIT 28.5*  --  23.3*  --   --  22.7*   PLATELETCT 173  --  158*  --   --  156*    < > = values in this interval not displayed.     Recent Labs     20   WBC 12.0* 7.0 6.8   NEUTSPOLYS 76.00* 56.10 55.60   LYMPHOCYTES 12.90* 28.00 26.70   MONOCYTES 9.60 12.40 13.30   EOSINOPHILS 0.70 2.90 4.00   BASOPHILS 0.30 0.30 0.10   ASTSGOT 14 11* 11*   ALTSGPT 9 7 11   ALKPHOSPHAT 74 66 59   TBILIRUBIN 0.5 0.4 0.3     Hemodynamics:  Temp (24hrs), Av.6 °C (97.8 °F), Min:36.2 °C (97.1 °F), Max:36.8 °C (98.3 °F)  Temperature: 36.2 °C (97.1 °F)  Pulse  Av.4  Min: 62  Max: 105   Blood Pressure : 101/49     Respiratory:    Respiration: 18, Pulse Oximetry: 100 %        RUL Breath Sounds: Clear, RML Breath Sounds: Diminished;Clear, RLL Breath Sounds: Diminished, ZAIN Breath Sounds: Diminished;Clear, LLL Breath Sounds: Diminished  Fluids:    Intake/Output Summary (Last 24 hours) at 2020 0822  Last data filed at 2020 0000  Gross per 24 hour   Intake 300 ml   Output 400 ml   Net -100 ml        GI/Nutrition:  Orders Placed This Encounter   Procedures   • Diet Order Regular (No reds)     Standing Status:   Standing     Number of Occurrences:   1     Order Specific Question:   Diet:     Answer:   Regular [1]     Comments:   No reds     Medical Decision Making, by Problem:  Active Hospital Problems    Diagnosis   • *Diverticulosis [K57.90]   • Benign essential HTN [I10]   • Dementia (HCC) [F03.90]   • Hard of hearing [H91.90]   • DNR (do not resuscitate) [Z66]   • Atherosclerosis [I70.90]   • HLD (hyperlipidemia) [E78.5]   • Hypothyroid [E03.9]   • Acute blood loss anemia [D62]     Impression:  1. Recurrent lower GI bleeding: DDX diverticular in nature vs other. Imaging evaluation to date negative.  S/p push enteroscopy negative for source 8/5/2020. Colonoscopy 8/7/2020 with 6 polyps removed.  2. Anemia- improved overnight      Plan:  1. Advance diet as tolerated  2. Iron check and replacement per team  3. Await pathology  4. Follow-up as needed  5. Increase dietary fiber.      GI TO SIGN OFF / STAND BY - Thank you very much for allowing me to participate in the care of your patient.  Please feel free to contact me anytime at [phone]    Quality-Core Measures

## 2020-08-08 NOTE — PROGRESS NOTES
Bedside report received.  Assessment complete.  A&O x 4. Patient forgets to use call light.  Patient up with 1 assist. Bed alarm yes.   Patient has 2/10 pain. Medication per MAR  Denies N&V. Tolerating full liquid diet.  + void, + flatus, + BM.  Patient denies SOB.  SCD's yes.  Patient hard of hearing, speak into left ear, hearing aids home with daughter.  Review plan with of care with patient. Call light and personal belongings with in reach. Hourly rounding in place. All needs met at this time.

## 2020-08-08 NOTE — PROGRESS NOTES
"Patient fatigued today ambulated with RN and FWW to bathroom, will take with 2 person assistance. Patient had small bowel movement with small clots. Incontinent of urine was using pur wick at night diaper during the day. H&H 7.2 trended down from 7.5, q8 HOURS. PT/OT to work with patient. MD orders reviewed, all questions answered. /49   Pulse 73   Temp 36.2 °C (97.1 °F) (Temporal)   Resp 18   Ht 1.549 m (5' 1\")   Wt 73.5 kg (162 lb 0.6 oz)   SpO2 100%   BMI 30.62 kg/m²     "

## 2020-08-08 NOTE — PROGRESS NOTES
Hospital Medicine Daily Progress Note    Date of Service  8/8/2020    Chief Complaint  89 y.o. female admitted 8/1/2020 with gi bleed    Hospital Course     89 y.o. female with dementia, prior lower GI bleed from diverticuli, dyslipidemia, hypothyroidism who presented 8/1/2020 with bright red blood per rectum this morning.  Her daughter normally helps change her adult briefs for her and at 430 she went to help her and noticed clean briefs other than urine.  Then at 730 she went to change her briefs and the patient had foul-smelling bright red blood of clumps of stool in it.  There is no diarrhea.  The patient denies any heartburn she has not been taking any NSAIDs she had taken an occasional aspirin but not on a routine basis for headache.  She had had prior episodes in the past of diverticular bleed.  Patient was not having nausea or vomiting until she got to the hospital at which time she told her daughter she felt nauseous when she got into the ER she vomited up coffee that she drinks earlier.  The patient did have cream of wheat this morning.     Gastroenterologist Dr. Ryland Goins, and myself did evaluate patient together.  He felt that this is likely diverticular bleed we will monitor her.  He was okay with patient being on clear liquids.  If the patient has any acute bleeding again we will need to have a stat repeat CTA.         Interval Problem Update  Hemoglobin has been dropping  S/p prbc transfusion  S/p colonscopy on 8/7/20    Consultants/Specialty  gi    Code Status  dnr    Disposition  pending    Review of Systems  Review of Systems   Constitutional: Negative for chills, diaphoresis and malaise/fatigue.   HENT: Positive for hearing loss. Negative for ear discharge and ear pain.    Eyes: Negative for blurred vision, double vision and photophobia.   Respiratory: Negative for cough, hemoptysis and sputum production.    Cardiovascular: Negative for chest pain, palpitations and orthopnea.   Gastrointestinal:  Negative for heartburn, nausea and vomiting.   Genitourinary: Negative for dysuria, frequency and urgency.   Musculoskeletal: Negative for back pain, myalgias and neck pain.   Skin: Negative for itching.   Neurological: Negative for dizziness, tingling, tremors and headaches.        Physical Exam  Temp:  [36.2 °C (97.1 °F)-36.8 °C (98.3 °F)] 36.2 °C (97.1 °F)  Pulse:  [65-87] 73  Resp:  [14-20] 18  BP: ()/(30-64) 101/49  SpO2:  [96 %-100 %] 100 %    Physical Exam  Constitutional:       Appearance: She is not toxic-appearing or diaphoretic.   HENT:      Head: Normocephalic and atraumatic.      Nose: Nose normal.   Eyes:      Extraocular Movements: Extraocular movements intact.      Pupils: Pupils are equal, round, and reactive to light.   Neck:      Musculoskeletal: Normal range of motion and neck supple.   Cardiovascular:      Rate and Rhythm: Normal rate and regular rhythm.      Pulses: Normal pulses.      Heart sounds: Normal heart sounds.   Pulmonary:      Effort: Pulmonary effort is normal.      Breath sounds: Normal breath sounds.   Abdominal:      General: Bowel sounds are normal.      Palpations: Abdomen is soft.      Tenderness: There is no rebound.      Comments: obese   Skin:     Coloration: Skin is pale. Skin is not jaundiced.   Neurological:      Mental Status: She is alert. Mental status is at baseline.         Fluids    Intake/Output Summary (Last 24 hours) at 8/8/2020 1125  Last data filed at 8/8/2020 1021  Gross per 24 hour   Intake 1100 ml   Output 1400 ml   Net -300 ml       Laboratory  Recent Labs     08/06/20  0415  08/07/20  0432 08/07/20  1319 08/07/20 2008 08/08/20  0430   WBC 12.0*  --  7.0  --   --  6.8   RBC 3.06*  --  2.50*  --   --  2.38*   HEMOGLOBIN 9.3*   < > 7.6* 8.2* 7.5* 7.2*   HEMATOCRIT 28.5*  --  23.3*  --   --  22.7*   MCV 93.1  --  93.2  --   --  95.4   MCH 30.4  --  30.4  --   --  30.3   MCHC 32.6*  --  32.6*  --   --  31.7*   RDW 51.2*  --  50.5*  --   --  50.8*    PLATELETCT 173  --  158*  --   --  156*   MPV 9.4  --  9.3  --   --  9.6    < > = values in this interval not displayed.     Recent Labs     08/06/20  0415 08/07/20  0432 08/08/20  0430   SODIUM 137 138 138   POTASSIUM 3.8 3.6 4.1   CHLORIDE 98 99 100   CO2 28 31 32   GLUCOSE 102* 89 92   BUN 7* 6* 7*   CREATININE 0.36* 0.29* 0.34*   CALCIUM 9.2 9.0 9.1                   Imaging       Assessment/Plan  * Diverticulosis- (present on admission)  Assessment & Plan  S/p PRBC transfusion  Nuclear bleeding scan is negative  Gi input is noted  S/p colonscopy   And recommended CTA if she has further bleeding  Gi also recommended iv iron  Pt feels weak and looks pale and will transfuse 1 more unit of PRBC    Gi stated in their last note:  Findings:    TI: unable to enter due to looping and tortuosity  Ascending colon: normal  Transverse colon: 6 polyps (randing from 6mm - 1cm, largest x 1 removed with cautery snare, rest cold snare), removed  Descending colon: moderate diverticulosis, no bleeding  Sigmoid colon: severe diverticulosis, no bleeding  Rectum: normal. Retroflexion showed anal papillae, grade 1 internal hemorrhoids     Impressions:   1. Suspect possible diverticular bleeding as cause of hematochezia  2. 6 polyps removed with hot snare/cold snare techniques  3. Moderate/severe diverticulosis     Recommendations:   1. Monitor for postprocedure complication including bleeding perforation  2. Increase fiber  3. If recurrence of bleeding, obtain stat CTA vs Tagged RBC with embolization  4. Await pathology  5. Continue monitor H/H  6. Start liquid diet today and advance tomorrow.    Benign essential HTN- (present on admission)  Assessment & Plan  BP is on the low side  Decreased lopressor.    Acute blood loss anemia  Assessment & Plan  ACUTE   2nd to gi bleed  Normocytic  Transfuse if hemoglobin is less then 7  S/p prbc transfusion  Will monitor H/H    Atherosclerosis  Assessment & Plan  CT showing atherosclerosis with  narrowing of the SMA artery as well as renal arteries.  No current renal failure.  Continue medical management with blood pressure control    DNR (do not resuscitate)  Assessment & Plan  Daughter, Meme, states she her mother wants DNR/DNI    Hard of hearing  Assessment & Plan  Has hearing aids, hears better in left ear.  We will have to talk loudly and slowly on her left side.    Dementia (HCC)  Assessment & Plan  Daughter cares for her mother and states she is POA.    HLD (hyperlipidemia)- (present on admission)  Assessment & Plan  Healthy balanced diet.    Hypothyroid- (present on admission)  Assessment & Plan  Levothyroxine 112 mcg daily  Last TSH:1.61 in past year       VTE prophylaxis: scd

## 2020-08-09 LAB
ALBUMIN SERPL BCP-MCNC: 2.7 G/DL (ref 3.2–4.9)
ALBUMIN/GLOB SERPL: 1.1 G/DL
ALP SERPL-CCNC: 68 U/L (ref 30–99)
ALT SERPL-CCNC: 8 U/L (ref 2–50)
ANION GAP SERPL CALC-SCNC: 9 MMOL/L (ref 7–16)
AST SERPL-CCNC: 13 U/L (ref 12–45)
BASOPHILS # BLD AUTO: 0.2 % (ref 0–1.8)
BASOPHILS # BLD: 0.01 K/UL (ref 0–0.12)
BILIRUB SERPL-MCNC: 0.4 MG/DL (ref 0.1–1.5)
BUN SERPL-MCNC: 7 MG/DL (ref 8–22)
CALCIUM SERPL-MCNC: 8.8 MG/DL (ref 8.5–10.5)
CHLORIDE SERPL-SCNC: 101 MMOL/L (ref 96–112)
CO2 SERPL-SCNC: 30 MMOL/L (ref 20–33)
CREAT SERPL-MCNC: 0.29 MG/DL (ref 0.5–1.4)
EOSINOPHIL # BLD AUTO: 0.24 K/UL (ref 0–0.51)
EOSINOPHIL NFR BLD: 3.8 % (ref 0–6.9)
ERYTHROCYTE [DISTWIDTH] IN BLOOD BY AUTOMATED COUNT: 50.3 FL (ref 35.9–50)
GLOBULIN SER CALC-MCNC: 2.4 G/DL (ref 1.9–3.5)
GLUCOSE SERPL-MCNC: 97 MG/DL (ref 65–99)
HCT VFR BLD AUTO: 26.6 % (ref 37–47)
HGB BLD-MCNC: 8.7 G/DL (ref 12–16)
IMM GRANULOCYTES # BLD AUTO: 0.02 K/UL (ref 0–0.11)
IMM GRANULOCYTES NFR BLD AUTO: 0.3 % (ref 0–0.9)
LYMPHOCYTES # BLD AUTO: 1.62 K/UL (ref 1–4.8)
LYMPHOCYTES NFR BLD: 25.8 % (ref 22–41)
MCH RBC QN AUTO: 30.7 PG (ref 27–33)
MCHC RBC AUTO-ENTMCNC: 32.7 G/DL (ref 33.6–35)
MCV RBC AUTO: 94 FL (ref 81.4–97.8)
MONOCYTES # BLD AUTO: 0.88 K/UL (ref 0–0.85)
MONOCYTES NFR BLD AUTO: 14 % (ref 0–13.4)
NEUTROPHILS # BLD AUTO: 3.5 K/UL (ref 2–7.15)
NEUTROPHILS NFR BLD: 55.9 % (ref 44–72)
NRBC # BLD AUTO: 0 K/UL
NRBC BLD-RTO: 0 /100 WBC
PLATELET # BLD AUTO: 176 K/UL (ref 164–446)
PMV BLD AUTO: 9.3 FL (ref 9–12.9)
POTASSIUM SERPL-SCNC: 3.9 MMOL/L (ref 3.6–5.5)
PROT SERPL-MCNC: 5.1 G/DL (ref 6–8.2)
RBC # BLD AUTO: 2.83 M/UL (ref 4.2–5.4)
SODIUM SERPL-SCNC: 140 MMOL/L (ref 135–145)
WBC # BLD AUTO: 6.3 K/UL (ref 4.8–10.8)

## 2020-08-09 PROCEDURE — 80053 COMPREHEN METABOLIC PANEL: CPT

## 2020-08-09 PROCEDURE — 94760 N-INVAS EAR/PLS OXIMETRY 1: CPT

## 2020-08-09 PROCEDURE — 700105 HCHG RX REV CODE 258: Performed by: FAMILY MEDICINE

## 2020-08-09 PROCEDURE — A9270 NON-COVERED ITEM OR SERVICE: HCPCS | Performed by: HOSPITALIST

## 2020-08-09 PROCEDURE — 700111 HCHG RX REV CODE 636 W/ 250 OVERRIDE (IP): Performed by: FAMILY MEDICINE

## 2020-08-09 PROCEDURE — 700105 HCHG RX REV CODE 258

## 2020-08-09 PROCEDURE — 36415 COLL VENOUS BLD VENIPUNCTURE: CPT

## 2020-08-09 PROCEDURE — 700102 HCHG RX REV CODE 250 W/ 637 OVERRIDE(OP): Performed by: FAMILY MEDICINE

## 2020-08-09 PROCEDURE — 700102 HCHG RX REV CODE 250 W/ 637 OVERRIDE(OP): Performed by: HOSPITALIST

## 2020-08-09 PROCEDURE — 99232 SBSQ HOSP IP/OBS MODERATE 35: CPT | Performed by: FAMILY MEDICINE

## 2020-08-09 PROCEDURE — C9113 INJ PANTOPRAZOLE SODIUM, VIA: HCPCS | Performed by: FAMILY MEDICINE

## 2020-08-09 PROCEDURE — A9270 NON-COVERED ITEM OR SERVICE: HCPCS | Performed by: FAMILY MEDICINE

## 2020-08-09 PROCEDURE — 85025 COMPLETE CBC W/AUTO DIFF WBC: CPT

## 2020-08-09 PROCEDURE — 770006 HCHG ROOM/CARE - MED/SURG/GYN SEMI*

## 2020-08-09 RX ORDER — FERROUS SULFATE 325(65) MG
325 TABLET ORAL 2 TIMES DAILY
Qty: 60 TAB | Refills: 0 | Status: SHIPPED | OUTPATIENT
Start: 2020-08-09 | End: 2021-07-23

## 2020-08-09 RX ORDER — SODIUM CHLORIDE 9 MG/ML
INJECTION, SOLUTION INTRAVENOUS
Status: COMPLETED
Start: 2020-08-09 | End: 2020-08-09

## 2020-08-09 RX ADMIN — METOPROLOL TARTRATE 12.5 MG: 25 TABLET, FILM COATED ORAL at 05:05

## 2020-08-09 RX ADMIN — SODIUM CHLORIDE 500 ML: 9 INJECTION, SOLUTION INTRAVENOUS at 17:22

## 2020-08-09 RX ADMIN — LEVOTHYROXINE SODIUM 112 MCG: 112 TABLET ORAL at 05:05

## 2020-08-09 RX ADMIN — METOPROLOL TARTRATE 12.5 MG: 25 TABLET, FILM COATED ORAL at 17:00

## 2020-08-09 RX ADMIN — DOCUSATE SODIUM 50 MG AND SENNOSIDES 8.6 MG 2 TABLET: 8.6; 5 TABLET, FILM COATED ORAL at 17:00

## 2020-08-09 RX ADMIN — SODIUM CHLORIDE 125 MG: 9 INJECTION, SOLUTION INTRAVENOUS at 17:08

## 2020-08-09 RX ADMIN — PANTOPRAZOLE SODIUM 40 MG: 40 INJECTION, POWDER, LYOPHILIZED, FOR SOLUTION INTRAVENOUS at 05:06

## 2020-08-09 NOTE — RESPIRATORY CARE
Oxygen Rounds      Patient found on    O2 L/m:  ___1______    Oxygen device:  _____nc___   Spo2: ____96_____%     Respiratory device skin site inspection completed.

## 2020-08-09 NOTE — DISCHARGE INSTRUCTIONS
Discharge Instructions    Discharged to home by car with relative. Discharged via wheelchair, hospital escort: Yes.  Special equipment needed: Not Applicable    Be sure to schedule a follow-up appointment with your primary care doctor or any specialists as instructed.     Discharge Plan:   Diet Plan: Discussed  Activity Level: Discussed  Confirmed Follow up Appointment: Patient to Call and Schedule Appointment  Confirmed Symptoms Management: Discussed  Medication Reconciliation Updated: Yes    I understand that a diet low in cholesterol, fat, and sodium is recommended for good health. Unless I have been given specific instructions below for another diet, I accept this instruction as my diet prescription.   Other diet: Advance to regular diet as tolerated    Special Instructions: None    · Is patient discharged on Warfarin / Coumadin?   No   Gastrointestinal Bleeding  Gastrointestinal (GI) bleeding is bleeding somewhere along the path that food travels through the body (digestive tract). This path is anywhere between the mouth and the opening of the butt (anus). You may have blood in your poop (stool) or have black poop. If you throw up (vomit), there may be blood in it.  This condition can be mild, serious, or even life-threatening. If you have a lot of bleeding, you may need to stay in the hospital.  What are the causes?  This condition may be caused by:  Irritation and swelling of the esophagus (esophagitis). The esophagus is part of the body that moves food from your mouth to your stomach.  Swollen veins in the butt (hemorrhoids).  Areas of painful tearing in the opening of the butt (anal fissures). These are often caused by passing hard poop.  Pouches that form on the colon over time (diverticulosis).  Irritation and swelling (diverticulitis) in areas where pouches have formed on the colon.  Growths (polyps) or cancer. Colon cancer often starts out as growths that are not cancer.  Irritation of the stomach  lining (gastritis).  Sores (ulcers) in the stomach.  What increases the risk?  You are more likely to develop this condition if you:  Have a certain type of infection in your stomach (Helicobacter pylori infection).  Take certain medicines.  Smoke.  Drink alcohol.  What are the signs or symptoms?  Common symptoms of this condition include:  Throwing up (vomiting) material that has bright red blood in it. It may look like coffee grounds.  Changes in your poop. The poop may:  Have red blood in it.  Be black, look like tar, and smell stronger than normal.  Be red.  Pain or cramping in the belly (abdomen).  How is this treated?  Treatment for this condition depends on the cause of the bleeding. For example:  Sometimes, the bleeding can be stopped during a procedure that is done to find the problem (endoscopy or colonoscopy).  Medicines can be used to:  Help control irritation, swelling, or infection.  Reduce acid in your stomach.  Certain problems can be treated with:  Creams.  Medicines that are put in the butt (suppositories).  Warm baths.  Surgery is sometimes needed.  If you lose a lot of blood, you may need a blood transfusion.  If bleeding is mild, you may be allowed to go home. If there is a lot of bleeding, you will need to stay in the hospital.  Follow these instructions at home:    Take over-the-counter and prescription medicines only as told by your doctor.  Eat foods that have a lot of fiber in them. These foods include beans, whole grains, and fresh fruits and vegetables. You can also try eating 1-3 prunes each day.  Drink enough fluid to keep your pee (urine) pale yellow.  Keep all follow-up visits as told by your doctor. This is important.  Contact a doctor if:  Your symptoms do not get better.  Get help right away if:  Your bleeding does not stop.  You feel dizzy or you pass out (faint).  You feel weak.  You have very bad cramps in your back or belly.  You pass large clumps of blood (clots) in your  poop.  Your symptoms are getting worse.  You have chest pain or fast heartbeats.  Summary  GI bleeding is bleeding somewhere along the path that food travels through the body (digestive tract).  This bleeding can be caused by many things. Treatment depends on the cause of the bleeding.  Take medicines only as told by your doctor.  Keep all follow-up visits as told by your doctor. This is important.  This information is not intended to replace advice given to you by your health care provider. Make sure you discuss any questions you have with your health care provider.  Document Released: 09/26/2009 Document Revised: 07/31/2019 Document Reviewed: 07/31/2019  [employer] Patient Education © 2020 [employer]      Depression / Suicide Risk    As you are discharged from this [facility] facility, it is important to learn how to keep safe from harming yourself.    Recognize the warning signs:  · Abrupt changes in personality, positive or negative- including increase in energy   · Giving away possessions  · Change in eating patterns- significant weight changes-  positive or negative  · Change in sleeping patterns- unable to sleep or sleeping all the time   · Unwillingness or inability to communicate  · Depression  · Unusual sadness, discouragement and loneliness  · Talk of wanting to die  · Neglect of personal appearance   · Rebelliousness- reckless behavior  · Withdrawal from people/activities they love  · Confusion- inability to concentrate     If you or a loved one observes any of these behaviors or has concerns about self-harm, here's what you can do:  · Talk about it- your feelings and reasons for harming yourself  · Remove any means that you might use to hurt yourself (examples: pills, rope, extension cords, firearm)  · Get professional help from the community (Mental Health, Substance Abuse, psychological counseling)  · Do not be alone:Call your Safe Contact- someone whom you trust who will be there for you.  · Call  your local CRISIS HOTLINE 047-6155 or 503-293-6246  · Call your local Children's Mobile Crisis Response Team [place] (647) 032-1485 or www.[employer]  · Call the toll free National Suicide Prevention Hotlines   · National Suicide Prevention Lifeline 309-006-SLHY (2469)  · National Hope Line Network 800-SUICIDE (943-5911)

## 2020-08-09 NOTE — PROGRESS NOTES
0715: Assumed care of pt after report. Pt sitting up in bed eating breakfast, no concerns voiced. No s/s of distress. Bed alarm on.    1320: PT unable to see pt for eval until [id number]. Notified Meme daughter and she will not be able to  her mother that late. Notified Eugene PAK.    1830: PT never showed up, will try for PT eval and discharge tomorrow

## 2020-08-09 NOTE — DISCHARGE SUMMARY
Discharge Summary    CHIEF COMPLAINT ON ADMISSION  Chief Complaint   Patient presents with   • Bloody Stools     bright red blood and clots in stool this am, hx of diveriticulits       Reason for Admission  Other     Admission Date  8/1/2020    CODE STATUS  DNAR/DNI    HPI & HOSPITAL COURSE  This is a 89 y.o. female here with  dementia, prior lower GI bleed from diverticuli, dyslipidemia, hypothyroidism who presented 8/1/2020 with bright red blood per rectum this morning.  Her daughter normally helps change her adult briefs for her and at 430 she went to help her and noticed clean briefs other than urine.  Then at 730 she went to change her briefs and the patient had foul-smelling bright red blood of clumps of stool in it.  There is no diarrhea.  The patient denies any heartburn she has not been taking any NSAIDs she had taken an occasional aspirin but not on a routine basis for headache.  She had had prior episodes in the past of diverticular bleed.  Patient was not having nausea or vomiting until she got to the hospital at which time she told her daughter she felt nauseous when she got into the ER she vomited up coffee that she drinks earlier.  The patient did have cream of wheat this morning.     Gastroenterologist Dr. Ryland Goins, and myself did evaluate patient together.  He felt that this is likely diverticular bleed we will monitor her.  He was okay with patient being on clear liquids.  If the patient has any acute bleeding again we will need to have a stat repeat CTA.  She was admitted with acute rectal bleed and her H/H were closely monitored and she received multiple prbc transfusions.  She was seen by gi and     She had GASTROSCOPY     Findings:   1.  Nonobstructing Schatzki's ring  2.  Large descending duodenal diverticulum with bile draining from the distal aspect.  The papilla of Vater was obscured by a fold.  The endoscopic view suggested blood in the bile, but the fluid was captured in a suction  trap, and appeared to be dark bile without blood.  3.  A cause of GI bleeding was not found during EGD/enteroscopy      She also had EGD with enteroscopy  FINDINGS   1.  Nonobstructing Schatzki's ring  2.  Large descending duodenal diverticulum with bile draining from the distal aspect.  The papilla of Vater was obscured by a fold.  The endoscopic view suggested blood in the bile, but the fluid was captured in a suction trap, and appeared to be dark bile without blood.  3.  A cause of GI bleeding was not found during EGD/enteroscopy     She also had colonscopy     Impressions:   1. Suspect possible diverticular bleeding as cause of hematochezia  2. 6 polyps removed with hot snare/cold snare techniques  3. Moderate/severe diverticulosis     Recommendations:   1. Monitor for postprocedure complication including bleeding perforation  2. Increase fiber  3. If recurrence of bleeding, obtain stat CTA vs Tagged RBC with embolization  4. Await pathology  5. Continue monitor H/H  6. Start liquid diet today and advance tomorrow.    I spoke to gi and cleared pt for discharge.her h/h are stable and her iron level was checked and she was placed on iv iron as per pharmacy.  She will be on oral iron and she will be discharged as per pt and ot recommendations.  Her h/h have been stable for the past 24 hrs.      Therefore, she is discharged in fair and stable condition to home with close outpatient follow-up.    The patient met 2-midnight criteria for an inpatient stay at the time of discharge.    Discharge Date  8/9/20    FOLLOW UP ITEMS POST DISCHARGE  pcp next week    DISCHARGE DIAGNOSES  Principal Problem:    Diverticulosis POA: Yes  Active Problems:    Benign essential HTN POA: Yes    Acute blood loss anemia POA: Unknown    Hypothyroid POA: Yes    HLD (hyperlipidemia) POA: Yes    Dementia (HCC) POA: Unknown    Hard of hearing POA: Unknown    DNR (do not resuscitate) POA: Unknown    Atherosclerosis POA: Unknown  Resolved  Problems:    * No resolved hospital problems. *      FOLLOW UP  No future appointments.  No follow-up provider specified.    MEDICATIONS ON DISCHARGE     Medication List      START taking these medications      Instructions   ferrous sulfate 325 (65 Fe) MG tablet   Take 1 Tab by mouth 2 Times a Day.  Dose: 325 mg        CONTINUE taking these medications      Instructions   levothyroxine 112 MCG Tabs  Commonly known as: SYNTHROID   Take 112 mcg by mouth Every morning on an empty stomach.  Dose: 112 mcg     METAMUCIL PO   Take 1 Cap by mouth 2 Times a Day.  Dose: 1 Cap     metoprolol 25 MG Tabs  Commonly known as: LOPRESSOR   Take 25 mg by mouth 2 times a day.  Dose: 25 mg     PRESERVISION AREDS 2+MULTI VIT PO   Take 1 Dose by mouth 2 Times a Day.  Dose: 1 Dose     Super B Complex/Vitamin C Tabs   Take 1 Tab by mouth every morning.  Dose: 1 Tab     SYSTANE OP   Place 1 Drop in both eyes every morning.  Dose: 1 Drop            Allergies  Allergies   Allergen Reactions   • Codeine Nausea     Upset stomach.   • Neosporin [Neomycin-Bacitracin-Polymyxin] Rash     Rash         DIET  Orders Placed This Encounter   Procedures   • Diet Order Regular (No reds)     Standing Status:   Standing     Number of Occurrences:   1     Order Specific Question:   Diet:     Answer:   Regular [1]     Comments:   No reds       ACTIVITY  As tolerated.  Weight bearing as tolerated    CONSULTATIONS  gi    PROCEDURES  egd  Colonscopy    LABORATORY  Lab Results   Component Value Date    SODIUM 140 08/09/2020    POTASSIUM 3.9 08/09/2020    CHLORIDE 101 08/09/2020    CO2 30 08/09/2020    GLUCOSE 97 08/09/2020    BUN 7 (L) 08/09/2020    CREATININE 0.29 (L) 08/09/2020        Lab Results   Component Value Date    WBC 6.3 08/09/2020    HEMOGLOBIN 8.7 (L) 08/09/2020    HEMATOCRIT 26.6 (L) 08/09/2020    PLATELETCT 176 08/09/2020        Total time of the discharge process exceeds 35 minutes.

## 2020-08-09 NOTE — PROGRESS NOTES
2 RN skin check:     Sacrum red, blanching.   R elbow pink, blanchable.   Bilateral ear redness - blanchable     Pt requires reminding to turn self.

## 2020-08-09 NOTE — CARE PLAN
Problem: Communication  Goal: The ability to communicate needs accurately and effectively will improve  Outcome: PROGRESSING AS EXPECTED  Intervention: Educate patient and significant other/support system about the plan of care, procedures, treatments, medications and allow for questions  Flowsheets (Taken 8/9/2020 0900)  Pt & Family Have Been Educated on Methods Available to Report Concerns Related to Care, Treatment, Services, and Patient Safety Issues: Yes  Note: Keep pt informed and educated about treatment and procedures     Problem: Safety  Goal: Will remain free from injury  Outcome: PROGRESSING AS EXPECTED  Goal: Will remain free from falls  Outcome: PROGRESSING AS EXPECTED  Intervention: Assess risk factors for falls  Flowsheets (Taken 8/9/2020 0900)  History of fall: 0  Mobility Status Assessment: 1-1 Healthcare Provider Required for Assistance with Ambulation & Transfer  Risk for Injury-Any positive answers results in the pt being at high risk for fall related injury:   Age:  Over 85 Years   Bones:  Metastasis, Osteoporosis  Note: Educate pt on falls precautions and monitor pt hourly for needs     Problem: Infection  Goal: Will remain free from infection  Outcome: PROGRESSING AS EXPECTED  Intervention: Assess signs and symptoms of infection  Note: Monitor for s.s of infection in assessment, labs, and vitals

## 2020-08-10 VITALS
HEIGHT: 61 IN | TEMPERATURE: 98.2 F | WEIGHT: 162.04 LBS | SYSTOLIC BLOOD PRESSURE: 119 MMHG | DIASTOLIC BLOOD PRESSURE: 60 MMHG | HEART RATE: 86 BPM | OXYGEN SATURATION: 94 % | RESPIRATION RATE: 16 BRPM | BODY MASS INDEX: 30.59 KG/M2

## 2020-08-10 PROCEDURE — 94760 N-INVAS EAR/PLS OXIMETRY 1: CPT

## 2020-08-10 PROCEDURE — 700111 HCHG RX REV CODE 636 W/ 250 OVERRIDE (IP): Performed by: FAMILY MEDICINE

## 2020-08-10 PROCEDURE — 99239 HOSP IP/OBS DSCHRG MGMT >30: CPT | Performed by: FAMILY MEDICINE

## 2020-08-10 PROCEDURE — C9113 INJ PANTOPRAZOLE SODIUM, VIA: HCPCS | Performed by: FAMILY MEDICINE

## 2020-08-10 PROCEDURE — 97162 PT EVAL MOD COMPLEX 30 MIN: CPT

## 2020-08-10 PROCEDURE — A9270 NON-COVERED ITEM OR SERVICE: HCPCS | Performed by: FAMILY MEDICINE

## 2020-08-10 PROCEDURE — 700102 HCHG RX REV CODE 250 W/ 637 OVERRIDE(OP): Performed by: HOSPITALIST

## 2020-08-10 PROCEDURE — 700102 HCHG RX REV CODE 250 W/ 637 OVERRIDE(OP): Performed by: FAMILY MEDICINE

## 2020-08-10 PROCEDURE — 97165 OT EVAL LOW COMPLEX 30 MIN: CPT

## 2020-08-10 PROCEDURE — A9270 NON-COVERED ITEM OR SERVICE: HCPCS | Performed by: HOSPITALIST

## 2020-08-10 RX ADMIN — METOPROLOL TARTRATE 12.5 MG: 25 TABLET, FILM COATED ORAL at 04:36

## 2020-08-10 RX ADMIN — LEVOTHYROXINE SODIUM 112 MCG: 112 TABLET ORAL at 04:36

## 2020-08-10 RX ADMIN — PANTOPRAZOLE SODIUM 40 MG: 40 INJECTION, POWDER, LYOPHILIZED, FOR SOLUTION INTRAVENOUS at 04:35

## 2020-08-10 ASSESSMENT — GAIT ASSESSMENTS
GAIT LEVEL OF ASSIST: MINIMAL ASSIST
DISTANCE (FEET): 15
ASSISTIVE DEVICE: FRONT WHEEL WALKER
DEVIATION: DECREASED BASE OF SUPPORT;STEP TO;BRADYKINETIC

## 2020-08-10 ASSESSMENT — COGNITIVE AND FUNCTIONAL STATUS - GENERAL
DAILY ACTIVITIY SCORE: 17
SUGGESTED CMS G CODE MODIFIER DAILY ACTIVITY: CK
DRESSING REGULAR LOWER BODY CLOTHING: A LITTLE
SUGGESTED CMS G CODE MODIFIER MOBILITY: CL
DRESSING REGULAR UPPER BODY CLOTHING: A LOT
MOBILITY SCORE: 13
TOILETING: A LITTLE
PERSONAL GROOMING: A LOT
CLIMB 3 TO 5 STEPS WITH RAILING: A LOT
HELP NEEDED FOR BATHING: A LITTLE
MOVING FROM LYING ON BACK TO SITTING ON SIDE OF FLAT BED: UNABLE
WALKING IN HOSPITAL ROOM: A LITTLE
TURNING FROM BACK TO SIDE WHILE IN FLAT BAD: A LITTLE
MOVING TO AND FROM BED TO CHAIR: UNABLE
STANDING UP FROM CHAIR USING ARMS: A LITTLE

## 2020-08-10 ASSESSMENT — ACTIVITIES OF DAILY LIVING (ADL): TOILETING: REQUIRES ASSIST

## 2020-08-10 NOTE — PROGRESS NOTES
Pt AO x 4  Vitals signs stable  Pt denies chest pain or SOB  O2 sat >90% on RA breathing unlabored   Pt denies pain.   Pt denies N/V/D  + voiding with purewick, changed per protocol.  + flatus, + Bowel sounds     Updated plan of care discussed with pt. Safety education done. Falls precautions in place.   Pt safety maintained. Hourly rounding done.

## 2020-08-10 NOTE — DISCHARGE PLANNING
Anticipated Discharge Disposition: Home with out patient f/u    Action:   · Reviewed chart  · GI bleed unknown origion/ not found on EGD/enteroscopy  · H&H stable  · Out pt f/u    Barriers to Discharge: none    Plan: Continue to collaborate with the pt, pt's family, and health care team to provide social and discharge support as needed.

## 2020-08-10 NOTE — PROGRESS NOTES
Discharging Patient home per physician order.  Discharged with daughter.  Demonstrated understanding of discharge instructions, follow up appointments, home medications, prescriptions, home care nursing care instructions for s/p GI bleed.  Ambulating without assistance, voiding without difficulty, pain well controlled, tolerating oral medications, oxygen saturation greater than 90%, tolerating diet.  Educational handouts were given and discussed.  Verbalized understanding of discharge instructions and educational handouts.  All questions answered.  Belongings with patient at time of discharge.

## 2020-08-10 NOTE — DISCHARGE SUMMARY
Discharge Summary    CHIEF COMPLAINT ON ADMISSION  Chief Complaint   Patient presents with   • Bloody Stools     bright red blood and clots in stool this am, hx of diveriticulits       Reason for Admission  Other     Admission Date  8/1/2020    CODE STATUS  DNAR/DNI    HPI & HOSPITAL COURSE  This is a 89 y.o. female here with  dementia, prior lower GI bleed from diverticuli, dyslipidemia, hypothyroidism who presented 8/1/2020 with bright red blood per rectum this morning.  Her daughter normally helps change her adult briefs for her and at 430 she went to help her and noticed clean briefs other than urine.  Then at 730 she went to change her briefs and the patient had foul-smelling bright red blood of clumps of stool in it.  There is no diarrhea.  The patient denies any heartburn she has not been taking any NSAIDs she had taken an occasional aspirin but not on a routine basis for headache.  She had had prior episodes in the past of diverticular bleed.  Patient was not having nausea or vomiting until she got to the hospital at which time she told her daughter she felt nauseous when she got into the ER she vomited up coffee that she drinks earlier.  The patient did have cream of wheat this morning.     Gastroenterologist Dr. Ryland Goins, and myself did evaluate patient together.  He felt that this is likely diverticular bleed we will monitor her.  He was okay with patient being on clear liquids.  If the patient has any acute bleeding again we will need to have a stat repeat CTA.  She was admitted with acute rectal bleed and her H/H were closely monitored and she received multiple prbc transfusions.  She was seen by gi and     She had GASTROSCOPY     Findings:   1.  Nonobstructing Schatzki's ring  2.  Large descending duodenal diverticulum with bile draining from the distal aspect.  The papilla of Vater was obscured by a fold.  The endoscopic view suggested blood in the bile, but the fluid was captured in a suction  trap, and appeared to be dark bile without blood.  3.  A cause of GI bleeding was not found during EGD/enteroscopy      She also had EGD with enteroscopy  FINDINGS   1.  Nonobstructing Schatzki's ring  2.  Large descending duodenal diverticulum with bile draining from the distal aspect.  The papilla of Vater was obscured by a fold.  The endoscopic view suggested blood in the bile, but the fluid was captured in a suction trap, and appeared to be dark bile without blood.  3.  A cause of GI bleeding was not found during EGD/enteroscopy     She also had colonscopy     Impressions:   1. Suspect possible diverticular bleeding as cause of hematochezia  2. 6 polyps removed with hot snare/cold snare techniques  3. Moderate/severe diverticulosis     Recommendations:   1. Monitor for postprocedure complication including bleeding perforation  2. Increase fiber  3. If recurrence of bleeding, obtain stat CTA vs Tagged RBC with embolization  4. Await pathology  5. Continue monitor H/H  6. Start liquid diet today and advance tomorrow.    I spoke to gi and cleared pt for discharge.her h/h are stable and her iron level was checked and she was placed on iv iron as per pharmacy.  She will be on oral iron and she will be discharged as per pt and ot recommendations.  Her h/h have been stable for the past 24 hrs.  Pt is doing well and will be discharged after she is seen by pt and ot.    Therefore, she is discharged in fair and stable condition to home with close outpatient follow-up.    The patient met 2-midnight criteria for an inpatient stay at the time of discharge.    Discharge Date  8/10/20    FOLLOW UP ITEMS POST DISCHARGE  pcp next week    DISCHARGE DIAGNOSES  Principal Problem:    Diverticulosis POA: Yes  Active Problems:    Benign essential HTN POA: Yes    Acute blood loss anemia POA: Unknown    Hypothyroid POA: Yes    HLD (hyperlipidemia) POA: Yes    Dementia (HCC) POA: Unknown    Hard of hearing POA: Unknown    DNR (do not  resuscitate) POA: Unknown    Atherosclerosis POA: Unknown  Resolved Problems:    * No resolved hospital problems. *      FOLLOW UP  No future appointments.      Schedule an appointment as soon as possible for a visit in 1 week        MEDICATIONS ON DISCHARGE     Medication List      START taking these medications      Instructions   ferrous sulfate 325 (65 Fe) MG tablet   Take 1 Tab by mouth 2 Times a Day.  Dose: 325 mg        CONTINUE taking these medications      Instructions   levothyroxine 112 MCG Tabs  Commonly known as: SYNTHROID   Take 112 mcg by mouth Every morning on an empty stomach.  Dose: 112 mcg     METAMUCIL PO   Take 1 Cap by mouth 2 Times a Day.  Dose: 1 Cap     metoprolol 25 MG Tabs  Commonly known as: LOPRESSOR   Take 25 mg by mouth 2 times a day.  Dose: 25 mg     PRESERVISION AREDS 2+MULTI VIT PO   Take 1 Dose by mouth 2 Times a Day.  Dose: 1 Dose     Super B Complex/Vitamin C Tabs   Take 1 Tab by mouth every morning.  Dose: 1 Tab     SYSTANE OP   Place 1 Drop in both eyes every morning.  Dose: 1 Drop            Allergies  Allergies   Allergen Reactions   • Codeine Nausea     Upset stomach.   • Neosporin [Neomycin-Bacitracin-Polymyxin] Rash     Rash         DIET  Orders Placed This Encounter   Procedures   • Diet Order Regular (No reds)     Standing Status:   Standing     Number of Occurrences:   1     Order Specific Question:   Diet:     Answer:   Regular [1]     Comments:   No reds       ACTIVITY  As tolerated.  Weight bearing as tolerated    CONSULTATIONS  gi    PROCEDURES  egd  Colonscopy    LABORATORY  Lab Results   Component Value Date    SODIUM 140 08/09/2020    POTASSIUM 3.9 08/09/2020    CHLORIDE 101 08/09/2020    CO2 30 08/09/2020    GLUCOSE 97 08/09/2020    BUN 7 (L) 08/09/2020    CREATININE 0.29 (L) 08/09/2020        Lab Results   Component Value Date    WBC 6.3 08/09/2020    HEMOGLOBIN 8.7 (L) 08/09/2020    HEMATOCRIT 26.6 (L) 08/09/2020    PLATELETCT 176 08/09/2020        Total time  of the discharge process exceeds 35 minutes.

## 2020-08-10 NOTE — THERAPY
"Occupational Therapy   Initial Evaluation     Patient Name: Марина Giron  Age:  89 y.o., Sex:  female  Medical Record #: [id number]  Today's Date: 8/10/2020     Precautions  Precautions: Fall Risk  Comments: Very hard of hearing    Assessment  Patient is 89 y.o. female with a diagnosis of blood clots in stool.  Additional factors influencing patient status / progress: Hx of the same, as well as signs of early dementia. Patient is very [place]. Per chart, she lives with her daughter Meme. Patient states that Meme is there all the time and helps her out of bed, supervises when she is walking, and helps with ADLs. Today, patient did require Min-Mod A for mobility and ADL, able to be managed by one person. As long as information provided by patient is consistent with daughter's ability to assist, anticipate she will be able to return home with home health.       Plan    Recommend Occupational Therapy 3 times per week until therapy goals are met for the following treatments:  Adaptive Equipment, Cognitive Skill Development, Manual Therapy Techniques, Neuro Re-Education / Balance, Self Care/Activities of Daily Living, Therapeutic Activities and Therapeutic Exercises.       Discharge Recommendations: Recommend home health for continued occupational therapy services     Subjective    \"I use two canes.\"     Objective       08/10/20 0959   Prior Living Situation   Prior Services None   Housing / Facility 1 Story House   Steps Into Home 0   Steps In Home 0   Bathroom Set up Walk In Shower;Shower Chair   Equipment Owned Single Point Cane  (2 SPCs, reports house too small to accomodate FWW)   Lives with - Patient's Self Care Capacity Adult Children   Comments Patient lives with her daughter, Meme, who is reportedly home at all times and able to help as needed   Prior Level of ADL Function   Self Feeding Independent   Grooming / Hygiene Independent   Bathing Requires Assist   Dressing Requires Assist   Toileting Requires Assist "   Prior Level of IADL Function   Medication Management Requires Assist   Laundry Requires Assist   Kitchen Mobility Requires Assist   Finances Requires Assist   Home Management Requires Assist   Shopping Requires Assist   Prior Level Of Mobility Supervision With Device in Community;Supervision With Device in Home   Comments Patient reports that her daughter assists with all mobility and is there whenever she is walking   Cognition    Cognition / Consciousness X   Speech/ Communication Hard of Hearing   Level of Consciousness Alert   Safety Awareness Impaired   New Learning Impaired   Comments Pleasant and cooperative, very [place], early signs of dementia per chart   ADL Assessment   Grooming Seated;Supervision   Toileting Maximal Assist   Comments Patient wearing brief   Functional Mobility   Sit to Stand Minimal Assist   Bed, Chair, Wheelchair Transfer Minimal Assist   Transfer Method Stand Pivot   Mobility sup><sit, STS   Comments Agreeable to sit in chair after eval   Short Term Goals   Short Term Goal # 1 Pt will complete ADL xfers supv   Short Term Goal # 2 Pt will complete standing G/H supv

## 2020-08-10 NOTE — PROGRESS NOTES
Bedside report received. Assessment completed.  Pt is A&O x4. Pt on 1 L NC  Denies pain   Denies nausea.  Sacrum pink and blanching   Pt incontinent of stool and urine.   Last BM 8/9. +flatus,   +void through purewick  Regular diet. Tolerates well.   Pt up x1-2 with FWW.   Call light within reach. All needs met at this time. Fall Precautions and hourly rounding in place.

## 2020-08-10 NOTE — CARE PLAN
Problem: Infection  Goal: Will remain free from infection  Outcome: PROGRESSING AS EXPECTED  Note: Hand hygiene complete. No s/s of infection. Education provided regarding infection prevention.      Problem: Bowel/Gastric:  Goal: Normal bowel function is maintained or improved  Outcome: PROGRESSING AS EXPECTED  Note: Pt had no bloody bm's during the day. Will continue to monitor

## 2020-08-10 NOTE — THERAPY
Physical Therapy   Initial Evaluation     Patient Name: Марина Giron  Age:  89 y.o., Sex:  female  Medical Record #: [id number]  Today's Date: 8/10/2020     Precautions: Fall Risk    Assessment  Patient is 89 y.o. female with a diagnosis of GI bleed.  PMHx includes dementia, diverticuli, dyslipidemia, hypothyroidism.  Patient demonstrates impairment strength, balance, activity tolerance, and cognition which are all contributing to patient's poor functional mobility.  Patient demonstrates poor overall sequencing with bed mobility and ambulation, which worsens in small space environment.  Patient is very [place], which contributes cueing difficulty, and does better with tactile cues.  Patient would require significant assist from family, and unsure if this is patient's baseline function.  Will defer to social work to determine.  If family cannot give necessary assist, patient would need post acute placement.         08/10/20 0930   Initial Contact Note    Initial Contact Note Order Received and Verified, Physical Therapy Evaluation in Progress with Full Report to Follow.   Precautions   Precautions Fall Risk   Prior Living Situation   Prior Services None   Housing / Facility 1 Story House   Steps Into Home 0   Steps In Home 0   Equipment Owned Front-Wheel Walker   Lives with - Patient's Self Care Capacity Adult Children   Prior Level of Functional Mobility   Bed Mobility Required Assist   Transfer Status Required Assist   Ambulation Required Assist   Distance Ambulation (Feet)   (unable to determine; appears to be very limited in amb)   Assistive Devices Used Front-Wheel Walker   Stairs Required Assist   History of Falls   History of Falls No   Cognition    Cognition / Consciousness X   Level of Consciousness Alert   Comments history of dementia   Passive ROM Lower Body   Passive ROM Lower Body WDL   Active ROM Lower Body    Active ROM Lower Body  WDL   Strength Lower Body   Lower Body Strength  X   Comments  significant diminished LE strength B, R>L   Balance Assessment   Sitting Balance (Static) Fair   Sitting Balance (Dynamic) Fair -   Standing Balance (Static) Fair -   Standing Balance (Dynamic) Poor   Weight Shift Sitting Poor   Weight Shift Standing Poor   Gait Analysis   Gait Level Of Assist Minimal Assist   Assistive Device Front Wheel Walker   Distance (Feet) 15   # of Times Distance was Traveled 1   Deviation Decreased Base Of Support;Step To;Bradykinetic   Weight Bearing Status fwb   Skilled Intervention Sequencing;Postural Facilitation;Verbal Cuing   Bed Mobility    Supine to Sit Moderate Assist   Sit to Supine Moderate Assist   Scooting Moderate Assist   Skilled Intervention Sequencing;Verbal Cuing   Functional Mobility   Sit to Stand Minimal Assist   Bed, Chair, Wheelchair Transfer Minimal Assist   Comments asssit with sequencing and AD management   How much difficulty does the patient currently have...   Turning over in bed (including adjusting bedclothes, sheets and blankets)? 3   Sitting down on and standing up from a chair with arms (e.g., wheelchair, bedside commode, etc.) 1   Moving from lying on back to sitting on the side of the bed? 1   How much help from another person does the patient currently need...   Moving to and from a bed to a chair (including a wheelchair)? 3   Need to walk in a hospital room? 3   Climbing 3-5 steps with a railing? 2   6 clicks Mobility Score 13   Short Term Goals    Short Term Goal # 1 Patient will be able to perform bed mobility at min A level in 6 tx in order to reduce caregiver assistance   Short Term Goal # 2 Patient will be able to ambulate 30 feet at min A in 6 tx in order to reduce care giver assist   Education Group   Education Provided Role of Physical Therapist;Gait Training;Use of Assistive Device   Role of Physical Therapist Patient Response Patient;Acceptance;Demonstration;Explanation;Verbal Demonstration;Action Demonstration   Gait Training Patient Response  Patient;Acceptance;Demonstration;Explanation;Verbal Demonstration;Action Demonstration   Use of Assistive Device Patient Response Patient;Acceptance;Demonstration;Explanation;Verbal Demonstration;Action Demonstration   Problem List    Problems None;Impaired Bed Mobility;Impaired Transfers;Impaired Ambulation;Functional ROM Deficit;Functional Strength Deficit;Impaired Balance;Decreased Activity Tolerance;Safety Awareness Deficits / Cognition;Motor Planning / Sequencing   Anticipated Discharge Equipment and Recommendations   DC Equipment Recommendations None   Discharge Recommendations Other -   Interdisciplinary Plan of Care Collaboration   IDT Collaboration with  Nursing   Patient Position at End of Therapy Phone within Reach;Tray Table within Reach;Call Light within Reach;Seated;Chair Alarm On   Collaboration Comments results of PT       Plan    Recommend Physical Therapy 3 times per week until therapy goals are met for the following treatments:  Bed Mobility, Community Re-integration, Gait Training, Neuro Re-Education / Balance, Self Care/Home Evaluation, Stair Training, Therapeutic Activities and Therapeutic Exercises    DC Equipment Recommendations: None  Discharge Recommendations:  Other - Recommend home with home health, if possible, if daughter is able to get appropriate assist for functional mobility, as patient states she is.  If not, then patient would need post acute placement.

## 2021-07-23 ENCOUNTER — APPOINTMENT (OUTPATIENT)
Dept: RADIOLOGY | Facility: MEDICAL CENTER | Age: 86
End: 2021-07-23
Attending: EMERGENCY MEDICINE
Payer: MEDICARE

## 2021-07-23 ENCOUNTER — HOSPITAL ENCOUNTER (EMERGENCY)
Facility: MEDICAL CENTER | Age: 86
End: 2021-07-24
Attending: EMERGENCY MEDICINE
Payer: MEDICARE

## 2021-07-23 DIAGNOSIS — K62.5 RECTAL BLEEDING: ICD-10-CM

## 2021-07-23 LAB
ABO GROUP BLD: NORMAL
ALBUMIN SERPL BCP-MCNC: 3.7 G/DL (ref 3.2–4.9)
ALBUMIN/GLOB SERPL: 1.1 G/DL
ALP SERPL-CCNC: 82 U/L (ref 30–99)
ALT SERPL-CCNC: 6 U/L (ref 2–50)
ANION GAP SERPL CALC-SCNC: 9 MMOL/L (ref 7–16)
APPEARANCE UR: CLEAR
APTT PPP: 28.7 SEC (ref 24.7–36)
AST SERPL-CCNC: 18 U/L (ref 12–45)
BASOPHILS # BLD AUTO: 0.3 % (ref 0–1.8)
BASOPHILS # BLD: 0.02 K/UL (ref 0–0.12)
BILIRUB SERPL-MCNC: 0.4 MG/DL (ref 0.1–1.5)
BILIRUB UR QL STRIP.AUTO: NEGATIVE
BLD GP AB SCN SERPL QL: NORMAL
BUN SERPL-MCNC: 15 MG/DL (ref 8–22)
CALCIUM SERPL-MCNC: 10 MG/DL (ref 8.5–10.5)
CHLORIDE SERPL-SCNC: 102 MMOL/L (ref 96–112)
CO2 SERPL-SCNC: 28 MMOL/L (ref 20–33)
COLOR UR: ABNORMAL
CREAT SERPL-MCNC: 0.47 MG/DL (ref 0.5–1.4)
EOSINOPHIL # BLD AUTO: 0.05 K/UL (ref 0–0.51)
EOSINOPHIL NFR BLD: 0.6 % (ref 0–6.9)
ERYTHROCYTE [DISTWIDTH] IN BLOOD BY AUTOMATED COUNT: 48.4 FL (ref 35.9–50)
ERYTHROCYTE [DISTWIDTH] IN BLOOD BY AUTOMATED COUNT: 48.6 FL (ref 35.9–50)
GLOBULIN SER CALC-MCNC: 3.3 G/DL (ref 1.9–3.5)
GLUCOSE SERPL-MCNC: 104 MG/DL (ref 65–99)
GLUCOSE UR STRIP.AUTO-MCNC: NEGATIVE MG/DL
HCT VFR BLD AUTO: 36.3 % (ref 37–47)
HCT VFR BLD AUTO: 36.7 % (ref 37–47)
HGB BLD-MCNC: 11.7 G/DL (ref 12–16)
HGB BLD-MCNC: 11.8 G/DL (ref 12–16)
IMM GRANULOCYTES # BLD AUTO: 0.03 K/UL (ref 0–0.11)
IMM GRANULOCYTES NFR BLD AUTO: 0.4 % (ref 0–0.9)
INR PPP: 0.97 (ref 0.87–1.13)
KETONES UR STRIP.AUTO-MCNC: ABNORMAL MG/DL
LEUKOCYTE ESTERASE UR QL STRIP.AUTO: NEGATIVE
LIPASE SERPL-CCNC: 43 U/L (ref 11–82)
LYMPHOCYTES # BLD AUTO: 1.48 K/UL (ref 1–4.8)
LYMPHOCYTES NFR BLD: 18.8 % (ref 22–41)
MCH RBC QN AUTO: 29.7 PG (ref 27–33)
MCH RBC QN AUTO: 29.8 PG (ref 27–33)
MCHC RBC AUTO-ENTMCNC: 32.2 G/DL (ref 33.6–35)
MCHC RBC AUTO-ENTMCNC: 32.2 G/DL (ref 33.6–35)
MCV RBC AUTO: 92.4 FL (ref 81.4–97.8)
MCV RBC AUTO: 92.4 FL (ref 81.4–97.8)
MICRO URNS: ABNORMAL
MONOCYTES # BLD AUTO: 0.79 K/UL (ref 0–0.85)
MONOCYTES NFR BLD AUTO: 10 % (ref 0–13.4)
NEUTROPHILS # BLD AUTO: 5.52 K/UL (ref 2–7.15)
NEUTROPHILS NFR BLD: 69.9 % (ref 44–72)
NITRITE UR QL STRIP.AUTO: NEGATIVE
NRBC # BLD AUTO: 0 K/UL
NRBC BLD-RTO: 0 /100 WBC
PH UR STRIP.AUTO: 6.5 [PH] (ref 5–8)
PLATELET # BLD AUTO: 197 K/UL (ref 164–446)
PLATELET # BLD AUTO: 206 K/UL (ref 164–446)
PMV BLD AUTO: 9.3 FL (ref 9–12.9)
PMV BLD AUTO: 9.5 FL (ref 9–12.9)
POTASSIUM SERPL-SCNC: 4.4 MMOL/L (ref 3.6–5.5)
PROT SERPL-MCNC: 7 G/DL (ref 6–8.2)
PROT UR QL STRIP: NEGATIVE MG/DL
PROTHROMBIN TIME: 12.6 SEC (ref 12–14.6)
RBC # BLD AUTO: 3.93 M/UL (ref 4.2–5.4)
RBC # BLD AUTO: 3.97 M/UL (ref 4.2–5.4)
RBC UR QL AUTO: NEGATIVE
RH BLD: NORMAL
SODIUM SERPL-SCNC: 139 MMOL/L (ref 135–145)
SP GR UR STRIP.AUTO: 1.02
UROBILINOGEN UR STRIP.AUTO-MCNC: 0.2 MG/DL
WBC # BLD AUTO: 7.1 K/UL (ref 4.8–10.8)
WBC # BLD AUTO: 7.9 K/UL (ref 4.8–10.8)

## 2021-07-23 PROCEDURE — 86850 RBC ANTIBODY SCREEN: CPT

## 2021-07-23 PROCEDURE — 80053 COMPREHEN METABOLIC PANEL: CPT

## 2021-07-23 PROCEDURE — 81003 URINALYSIS AUTO W/O SCOPE: CPT

## 2021-07-23 PROCEDURE — 86901 BLOOD TYPING SEROLOGIC RH(D): CPT

## 2021-07-23 PROCEDURE — 85027 COMPLETE CBC AUTOMATED: CPT | Mod: XU

## 2021-07-23 PROCEDURE — 85730 THROMBOPLASTIN TIME PARTIAL: CPT

## 2021-07-23 PROCEDURE — 99285 EMERGENCY DEPT VISIT HI MDM: CPT | Mod: 25

## 2021-07-23 PROCEDURE — 85025 COMPLETE CBC W/AUTO DIFF WBC: CPT

## 2021-07-23 PROCEDURE — 85610 PROTHROMBIN TIME: CPT

## 2021-07-23 PROCEDURE — 83690 ASSAY OF LIPASE: CPT

## 2021-07-23 PROCEDURE — 71045 X-RAY EXAM CHEST 1 VIEW: CPT

## 2021-07-23 PROCEDURE — 86900 BLOOD TYPING SEROLOGIC ABO: CPT

## 2021-07-23 RX ORDER — VITAMIN B COMPLEX
1000 TABLET ORAL DAILY
COMMUNITY

## 2021-07-23 ASSESSMENT — FIBROSIS 4 INDEX: FIB4 SCORE: 2.35

## 2021-07-23 NOTE — ED TRIAGE NOTES
"Марина Cordon [place]  Chief Complaint   Patient presents with   • Rectal Bleeding     small amount of blood yesterday evening, more this morning     Pt BIB wheelchair with daughter. Pt arrives for rectal bleeding. Daughter reports noticing a small amount of blood last night, then more this morning. Hx diverticulosis. Currently denies AP, nausea, vomiting.    Patient informed of triage process and to inform staff of any changes/worsening symptoms. Pt verbalized understanding. Pt denies concerns. Pt back to waiting room.     /73   Pulse 60   Temp 36.1 °C (97 °F) (Temporal)   Resp 18   Ht 1.575 m (5' 2\")   Wt 86.2 kg (190 lb)   SpO2 92%     "

## 2021-07-24 VITALS
BODY MASS INDEX: 34.96 KG/M2 | TEMPERATURE: 97 F | DIASTOLIC BLOOD PRESSURE: 82 MMHG | WEIGHT: 190 LBS | HEART RATE: 70 BPM | OXYGEN SATURATION: 93 % | RESPIRATION RATE: 18 BRPM | HEIGHT: 62 IN | SYSTOLIC BLOOD PRESSURE: 176 MMHG

## 2021-07-24 NOTE — ED NOTES
Med Rec completed: per pt at bedside with daughter Sofia present. Sofia provided a medication list and last doses for Pt.      No ORAL antibiotics in last 30 days    [facility] P: [phone]    Pt confirmed following allergies:  Allergies   Allergen Reactions   • Codeine Nausea     Upset stomach.   • Neosporin [Neomycin-Bacitracin-Polymyxin] Rash     Rash     • Morphine      Pt unsure of reaction        Pt's home medications:   Medication Sig   • vitamin D (CHOLECALCIFEROL) 1000 Unit (25 mcg) Tab Take 1,000 Units by mouth every day.   • Psyllium (METAMUCIL PO) Take 1 Cap by mouth 2 Times a Day.   • Multiple Vitamins-Minerals (PRESERVISION AREDS 2+MULTI VIT PO) Take 1 Dose by mouth 2 Times a Day.   • metoprolol (LOPRESSOR) 25 MG Tab Take 25 mg by mouth 2 times a day.   • levothyroxine (SYNTHROID) 112 MCG Tab Take 112 mcg by mouth Every morning on an empty stomach.   • Polyethyl Glycol-Propyl Glycol (SYSTANE OP) Place 1 Drop in both eyes every morning.   • B Complex-C (SUPER B COMPLEX/VITAMIN C) Tab Take 1 Tab by mouth every morning.       Removed medications:   Medication Removal Reason   • [DISCONTINUED] ferrous sulfate 325 (65 Fe) MG tablet Pt reports not taking

## 2021-07-24 NOTE — ED NOTES
Pt discharged home per provider in stable condition. Paperwork provided to pt via RN and discharge instructions went over with by RN - pt verbalized understanding of teaching with no questions or concerns and is A/Ox4, VSS. Paperwork in hand on discharge.    Pt was assisted into wheelchair by this RN and escorted out to lobby where daughter of pt had car pulled up. Pt in stable condition and at baseline. Instructions and understanding to follow up with gastro was verbalized and understood by daughter, caregiver. Paperwork in possession of daughter. No personal belongings were left behind in patient room.

## 2021-07-24 NOTE — ED PROVIDER NOTES
ED Provider Note    Scribed for Kala Irwin M.D. by Kyung Tierney. 7/23/2021  6:37 PM    Primary care provider: Pcp Not In Computer  Means of arrival: Wheel Chair  History obtained from: Patient   History limited by: None    CHIEF COMPLAINT  Chief Complaint   Patient presents with    Rectal Bleeding     small amount of blood yesterday evening, more this morning     PPE Note: I personally donned full PPE for all patient encounters during this visit, including wearing an N95 respirator mask and gloves. Scribe remained outside the patient's room and did not have any contact with the patient for the duration of patient encounter.      HPI  Марина Giron is a 89 y.o. female who presents to the Emergency Department with rectal bleeding onset yesterday. She states she found a small spot of dark red blood on her briefs yesterday. Today, she has had two episodes of hematochezia. During the first episode, the blood was dark and clumpy inside the bowl and on the stool. The second episode consisted of bright red blood in the stool and in the bowl. She also endorses bilateral leg pain, leg swelling, hip pain, and cough. She denies abdominal pain, fever, chills, polyuria, and dysuria. Every colonoscopy she has had, they have found polyps. She had polyps removed in 2019. She has not followed up with GI. She denies a history of hemorrhoids. Her daughter states she has signs of dementia, but she is otherwise at her baseline currently.     REVIEW OF SYSTEMS  HEENT:  No ear pain, congestion, or sore throat   EYES: no discharge, redness, or vision changes  CARDIAC: no chest pain, no palpitations    PULMONARY: Endorses cough, no dyspnea or congestion   GI: no vomiting, diarrhea, or abdominal pain   : Endorses hematochezia and rectal bleeding, no dysuria, back pain, hematuria, or polyuria  Neuro: no weakness, numbness, aphasia, or headache  Musculoskeletal: Endorses bilateral leg swelling, leg pain, hip pain, no  "deformity, no joint swelling  Endocrine: no fevers, sweating, or weight loss   SKIN: no rash, erythema, or contusions     See history of present illness. All other systems are negative. C.    PAST MEDICAL HISTORY   has a past medical history of Chronic back pain, Diverticulitis, Hypertension, and Hypothyroid.    SURGICAL HISTORY   has a past surgical history that includes colonoscopy - endo (5/24/2009); colonoscopy with polyp (10/11/2014); gastroscopy (N/A, 5/14/2019); colonoscopy (N/A, 5/14/2019); gastroscopy (N/A, 8/5/2020); and colonoscopy,diagnostic (N/A, 8/7/2020).    SOCIAL HISTORY  Social History     Tobacco Use    Smoking status: Never Smoker    Smokeless tobacco: Never Used   Vaping Use    Vaping Use: Never used   Substance Use Topics    Alcohol use: No    Drug use: No      Social History     Substance and Sexual Activity   Drug Use No       FAMILY HISTORY  History reviewed. No pertinent family history.    CURRENT MEDICATIONS  Current Outpatient Medications   Medication Instructions    B Complex-C (SUPER B COMPLEX/VITAMIN C) Tab 1 tablet, Oral, EVERY MORNING    ferrous sulfate 325 mg, Oral, 2 TIMES DAILY    levothyroxine (SYNTHROID) 112 mcg, Oral, EACH MORNING ON EMPTY STOMACH    metoprolol tartrate (LOPRESSOR) 25 mg, Oral, 2 TIMES DAILY    Multiple Vitamins-Minerals (PRESERVISION AREDS 2+MULTI VIT PO) 1 Dose, Oral, 2 TIMES DAILY    Polyethyl Glycol-Propyl Glycol (SYSTANE OP) 1 Drop, Both Eyes, EVERY MORNING    Psyllium (METAMUCIL PO) 1 capsule, Oral, 2 TIMES DAILY       ALLERGIES  Allergies   Allergen Reactions    Codeine Nausea     Upset stomach.    Neosporin [Neomycin-Bacitracin-Polymyxin] Rash     Rash      Morphine      Pt unsure of reaction       PHYSICAL EXAM  VITAL SIGNS: /73   Pulse 75   Temp 36.1 °C (97 °F) (Temporal)   Resp 17   Ht 1.575 m (5' 2\")   Wt 86.2 kg (190 lb)   SpO2 94%   BMI 34.75 kg/m²     Constitutional: Well developed, Well nourished, No acute distress, Non-toxic " appearance.   HEENT: Normocephalic, Atraumatic,  external ears normal, pharynx pink,  Mucous  Membranes moist, No rhinorrhea or mucosal edema  Eyes: PERRL, EOMI, Conjunctiva normal, No discharge.   Neck: Normal range of motion, No tenderness, Supple, No stridor.   Lymphatic: No lymphadenopathy    Cardiovascular: Regular Rate and Rhythm, No murmurs,  rubs, or gallops.   Thorax & Lungs: Lungs clear to auscultation bilaterally, No respiratory distress, No wheezes, rales or rhonchi, No chest wall tenderness.   Abdomen: Bowel sounds normal, Soft, non tender, non distended,  No pulsatile masses., no rebound guarding or peritoneal signs.   Skin: Warm, Dry, No erythema, No rash,   Back:  No CVA tenderness,  No spinal tenderness, bony crepitance, step offs, or instability.   Neurologic: Alert & oriented clear speech no focal deficits  Extremities: Equal, intact distal pulses, No cyanosis, clubbing or edema,  No tenderness.   Musculoskeletal: Good range of motion in all major joints. No tenderness to palpation or major deformities noted.   Rectal:  hemoccult positive, no thrombosed hemorrhoid or external hemorrhoid, no melena or visible hematochezia.     DIAGNOSTIC STUDIES / PROCEDURES    LABS  Results for orders placed or performed during the hospital encounter of 07/23/21   COD (ADULT)   Result Value Ref Range    ABO Grouping Only B     Rh Grouping Only POS     Antibody Screen-Cod NEG    CBC WITH DIFFERENTIAL   Result Value Ref Range    WBC 7.9 4.8 - 10.8 K/uL    RBC 3.93 (L) 4.20 - 5.40 M/uL    Hemoglobin 11.7 (L) 12.0 - 16.0 g/dL    Hematocrit 36.3 (L) 37.0 - 47.0 %    MCV 92.4 81.4 - 97.8 fL    MCH 29.8 27.0 - 33.0 pg    MCHC 32.2 (L) 33.6 - 35.0 g/dL    RDW 48.6 35.9 - 50.0 fL    Platelet Count 206 164 - 446 K/uL    MPV 9.3 9.0 - 12.9 fL    Neutrophils-Polys 69.90 44.00 - 72.00 %    Lymphocytes 18.80 (L) 22.00 - 41.00 %    Monocytes 10.00 0.00 - 13.40 %    Eosinophils 0.60 0.00 - 6.90 %    Basophils 0.30 0.00 - 1.80 %     Immature Granulocytes 0.40 0.00 - 0.90 %    Nucleated RBC 0.00 /100 WBC    Neutrophils (Absolute) 5.52 2.00 - 7.15 K/uL    Lymphs (Absolute) 1.48 1.00 - 4.80 K/uL    Monos (Absolute) 0.79 0.00 - 0.85 K/uL    Eos (Absolute) 0.05 0.00 - 0.51 K/uL    Baso (Absolute) 0.02 0.00 - 0.12 K/uL    Immature Granulocytes (abs) 0.03 0.00 - 0.11 K/uL    NRBC (Absolute) 0.00 K/uL   COMP METABOLIC PANEL   Result Value Ref Range    Sodium 139 135 - 145 mmol/L    Potassium 4.4 3.6 - 5.5 mmol/L    Chloride 102 96 - 112 mmol/L    Co2 28 20 - 33 mmol/L    Anion Gap 9.0 7.0 - 16.0    Glucose 104 (H) 65 - 99 mg/dL    Bun 15 8 - 22 mg/dL    Creatinine 0.47 (L) 0.50 - 1.40 mg/dL    Calcium 10.0 8.5 - 10.5 mg/dL    AST(SGOT) 18 12 - 45 U/L    ALT(SGPT) 6 2 - 50 U/L    Alkaline Phosphatase 82 30 - 99 U/L    Total Bilirubin 0.4 0.1 - 1.5 mg/dL    Albumin 3.7 3.2 - 4.9 g/dL    Total Protein 7.0 6.0 - 8.2 g/dL    Globulin 3.3 1.9 - 3.5 g/dL    A-G Ratio 1.1 g/dL   LIPASE   Result Value Ref Range    Lipase 43 11 - 82 U/L   PROTHROMBIN TIME   Result Value Ref Range    PT 12.6 12.0 - 14.6 sec    INR 0.97 0.87 - 1.13   APTT   Result Value Ref Range    APTT 28.7 24.7 - 36.0 sec   ESTIMATED GFR   Result Value Ref Range    GFR If African American >60 >60 mL/min/1.73 m 2    GFR If Non African American >60 >60 mL/min/1.73 m 2   URINALYSIS,CULTURE IF INDICATED    Specimen: Urine, Cath   Result Value Ref Range    Color DK Yellow     Character Clear     Specific Gravity 1.016 <1.035    Ph 6.5 5.0 - 8.0    Glucose Negative Negative mg/dL    Ketones Trace (A) Negative mg/dL    Protein Negative Negative mg/dL    Bilirubin Negative Negative    Urobilinogen, Urine 0.2 Negative    Nitrite Negative Negative    Leukocyte Esterase Negative Negative    Occult Blood Negative Negative    Micro Urine Req see below    CBC WITHOUT DIFFERENTIAL   Result Value Ref Range    WBC 7.1 4.8 - 10.8 K/uL    RBC 3.97 (L) 4.20 - 5.40 M/uL    Hemoglobin 11.8 (L) 12.0 - 16.0 g/dL     Hematocrit 36.7 (L) 37.0 - 47.0 %    MCV 92.4 81.4 - 97.8 fL    MCH 29.7 27.0 - 33.0 pg    MCHC 32.2 (L) 33.6 - 35.0 g/dL    RDW 48.4 35.9 - 50.0 fL    Platelet Count 197 164 - 446 K/uL    MPV 9.5 9.0 - 12.9 fL       All labs reviewed by me.    RADIOLOGY  DX-CHEST-PORTABLE (1 VIEW)   Final Result      Cardiomegaly with interstitial prominence.        The radiologist's interpretation of all radiological studies have been reviewed by me.    COURSE & MEDICAL DECISION MAKING  Nursing notes, VS, PMSFHx reviewed in chart.    6:37 PM Patient seen and examined at bedside. Ordered DX-Chest, CBC without diff, PT/INR, PTT, Estimated GFR, COD, CBC with diff, CMP,  LIipase, Prothrombin Time, and APTT to evaluate her symptoms. The differential diagnoses include but are not limited to: lower GI bleed, hemorrhoidal bleeding, diverticular bleeding, UTI     7:34 PM - Her GI specialist is Dr. Ramsey. Per his note, she has been diagnosed with moderate gastritis and diverticulosis. In 5/2019 and 9/2019 she was diagnosed with a GI bleed. Again in 8/2020 she was diagnosed with a GI bleed, but on  CTA at that time they could not find any active bleed.     10:22 PM -the patient's hemoglobin is stable and she has had no further bleeding. Paged GI.     10:27 PM - I discussed the patient's case and the above findings with Dr. Whitman (GI) who states the patient can follow-up with Dr. Ramsey on Monday and to return to the ED if her condition worsens.       HTN/IDDM FOLLOW UP:  The patient has known hypertension and is being followed by their primary care doctor     The patient will return for new or worsening symptoms and is stable at the time of discharge.    The patient is referred to a primary physician for blood pressure management, diabetic screening, and for all other preventative health concerns.    DISPOSITION:  Patient will be discharged home in stable condition.    FOLLOW UP:  Richy Ramsey M.D.  [address] [place] [address]  [id number]  [phone]    Call in 2 days  for recheck      FINAL IMPRESSION  1. Rectal bleeding          Kyung GARRIDO (Scribe), am scribing for, and in the presence of, Kala Irwin M.D..    Electronically signed by: Kyung Teirney (Scrib[place]oy), 7/23/2021    Kala GARRIDO M.D. personally performed the services described in this documentation, as scribed by Kyung Tierney in my presence, and it is both accurate and complete.    The note accurately reflects work and decisions made by me.  Kala Irwin M.D.  7/23/2021  11:11 PM

## 2024-11-06 NOTE — PROGRESS NOTES
-On Sinemet in the outpatient setting which can contribute to orthostatic hypotension  -Will defer adjustment to this medication to primary care team and neurology  -Continue to monitor   Assumed care of PT A&O 4. Pt resting in bed with no signs of labored breathing. On 2L NC. Tele monitor in place, cardiac rhythm being monitored. Call light within reach, bed in lowest position, upper bed rails up. Pt was updated on plan of care for the night. Will continue to monitor.

## (undated) DEVICE — BASIN EMESIS DISP. - (250/CA)

## (undated) DEVICE — GOWN SURGEONS X-LARGE - DISP. (30/CA)

## (undated) DEVICE — KIT CUSTOM PROCEDURE SINGLE FOR ENDO  (15/CA)

## (undated) DEVICE — SENSOR SPO2 NEO LNCS ADHESIVE (20/BX) SEE USER NOTES

## (undated) DEVICE — ELECTRODE 850 FOAM ADHESIVE - HYDROGEL RADIOTRNSPRNT (50/PK)

## (undated) DEVICE — TUBING O2 7FT TIP SMTH BORE - (50/CA)

## (undated) DEVICE — MASK WITH FACE SHIELD (25/BX 4BX/CA)

## (undated) DEVICE — CANISTER SUCTION RIGID RED 1500CC (40EA/CA)

## (undated) DEVICE — CONTAINER, SPECIMEN, STERILE

## (undated) DEVICE — CATHETER IV 20 GA X 1-1/4 ---SURG.& SDS ONLY--- (50EA/BX)

## (undated) DEVICE — TUBE NG SALEM SUMP 14FR (50EA/BX)

## (undated) DEVICE — BLOCK BITE ENDOSCOPIC 2809 - (100/BX) INTERMEDIATE

## (undated) DEVICE — NEPTUNE 4 PORT MANIFOLD - (20/PK)

## (undated) DEVICE — CANISTER SUCTION 3000ML MECHANICAL FILTER AUTO SHUTOFF MEDI-VAC NONSTERILE LF DISP  (40EA/CA)

## (undated) DEVICE — CANNULA O2 COMFORT SOFT EAR ADULT 7 FT TUBING (50/CA)

## (undated) DEVICE — TUBE CONNECTING SUCTION - CLEAR PLASTIC STERILE 72 IN (50EA/CA)

## (undated) DEVICE — MANIFOLD NEPTUNE 1 PORT (20/PK)

## (undated) DEVICE — FILM CASSETTE ENDO

## (undated) DEVICE — TRAP SPECIMEN MUCUS STERILE - (50/CA)

## (undated) DEVICE — SOD. CHL 10CC SYRINGE PREFILL - W/10 CC (30/BX)

## (undated) DEVICE — TRAP POLYP E-TRAP (25EA/BX)

## (undated) DEVICE — BITEBLOCK ENDOSCOPIC PEDI. - (25/BX)

## (undated) DEVICE — SET EXTENSION WITH 2 PORTS (48EA/CA) ***PART #2C8610 IS A SUBSTITUTE*****

## (undated) DEVICE — SPONGE GAUZE NON-STERILE 4X4 - (2000/CA 10PK/CA)

## (undated) DEVICE — TUBING CLEARLINK DUO-VENT - C-FLO (48EA/CA)